# Patient Record
Sex: MALE | Race: WHITE | Employment: OTHER | URBAN - METROPOLITAN AREA
[De-identification: names, ages, dates, MRNs, and addresses within clinical notes are randomized per-mention and may not be internally consistent; named-entity substitution may affect disease eponyms.]

---

## 2018-02-13 ENCOUNTER — APPOINTMENT (OUTPATIENT)
Dept: GENERAL RADIOLOGY | Age: 83
End: 2018-02-13
Attending: EMERGENCY MEDICINE
Payer: MEDICARE

## 2018-02-13 ENCOUNTER — HOSPITAL ENCOUNTER (EMERGENCY)
Age: 83
Discharge: HOME | End: 2018-02-13
Attending: EMERGENCY MEDICINE
Payer: MEDICARE

## 2018-02-13 VITALS
TEMPERATURE: 97.7 F | SYSTOLIC BLOOD PRESSURE: 139 MMHG | OXYGEN SATURATION: 94 % | BODY MASS INDEX: 24.34 KG/M2 | WEIGHT: 170 LBS | HEIGHT: 70 IN | HEART RATE: 65 BPM | RESPIRATION RATE: 18 BRPM | DIASTOLIC BLOOD PRESSURE: 57 MMHG

## 2018-02-13 DIAGNOSIS — R00.0 TACHYCARDIA: Primary | ICD-10-CM

## 2018-02-13 LAB
ALBUMIN SERPL-MCNC: 3.6 G/DL (ref 3.5–5)
ALBUMIN/GLOB SERPL: 0.9 {RATIO} (ref 1.1–2.2)
ALP SERPL-CCNC: 75 U/L (ref 45–117)
ALT SERPL-CCNC: 40 U/L (ref 12–78)
ANION GAP SERPL CALC-SCNC: 9 MMOL/L (ref 5–15)
AST SERPL-CCNC: 34 U/L (ref 15–37)
BASOPHILS # BLD: 0 K/UL (ref 0–0.1)
BASOPHILS NFR BLD: 0 % (ref 0–1)
BILIRUB SERPL-MCNC: 0.5 MG/DL (ref 0.2–1)
BUN SERPL-MCNC: 19 MG/DL (ref 6–20)
BUN/CREAT SERPL: 16 (ref 12–20)
CALCIUM SERPL-MCNC: 8.7 MG/DL (ref 8.5–10.1)
CHLORIDE SERPL-SCNC: 105 MMOL/L (ref 97–108)
CO2 SERPL-SCNC: 26 MMOL/L (ref 21–32)
CREAT SERPL-MCNC: 1.21 MG/DL (ref 0.7–1.3)
D DIMER PPP FEU-MCNC: 0.58 MG/L FEU (ref 0–0.65)
DIFFERENTIAL METHOD BLD: ABNORMAL
EOSINOPHIL # BLD: 0.4 K/UL (ref 0–0.4)
EOSINOPHIL NFR BLD: 8 % (ref 0–7)
ERYTHROCYTE [DISTWIDTH] IN BLOOD BY AUTOMATED COUNT: 14 % (ref 11.5–14.5)
GLOBULIN SER CALC-MCNC: 3.9 G/DL (ref 2–4)
GLUCOSE SERPL-MCNC: 103 MG/DL (ref 65–100)
HCT VFR BLD AUTO: 47.7 % (ref 36.6–50.3)
HGB BLD-MCNC: 15.6 G/DL (ref 12.1–17)
LACTATE SERPL-SCNC: 1.4 MMOL/L (ref 0.4–2)
LYMPHOCYTES # BLD: 1.5 K/UL
LYMPHOCYTES NFR BLD: 27 % (ref 12–49)
MCH RBC QN AUTO: 29.2 PG (ref 26–34)
MCHC RBC AUTO-ENTMCNC: 32.7 G/DL (ref 30–36.5)
MCV RBC AUTO: 89.3 FL (ref 80–99)
MONOCYTES # BLD: 0.7 K/UL (ref 0–1)
MONOCYTES NFR BLD: 13 % (ref 5–13)
NEUTS SEG # BLD: 2.8 K/UL (ref 1.8–8)
NEUTS SEG NFR BLD: 52 % (ref 32–75)
PLATELET # BLD AUTO: 160 K/UL (ref 150–400)
PMV BLD AUTO: 12.8 FL (ref 8.9–12.9)
POTASSIUM SERPL-SCNC: 4.2 MMOL/L (ref 3.5–5.1)
PROT SERPL-MCNC: 7.5 G/DL (ref 6.4–8.2)
RBC # BLD AUTO: 5.34 M/UL (ref 4.1–5.7)
SODIUM SERPL-SCNC: 140 MMOL/L (ref 136–145)
TROPONIN I SERPL-MCNC: <0.04 NG/ML
WBC # BLD AUTO: 5.5 K/UL (ref 4.1–11.1)
XXWBCSUS: 0

## 2018-02-13 PROCEDURE — 84484 ASSAY OF TROPONIN QUANT: CPT | Performed by: EMERGENCY MEDICINE

## 2018-02-13 PROCEDURE — 85025 COMPLETE CBC W/AUTO DIFF WBC: CPT | Performed by: EMERGENCY MEDICINE

## 2018-02-13 PROCEDURE — 74011250636 HC RX REV CODE- 250/636: Performed by: EMERGENCY MEDICINE

## 2018-02-13 PROCEDURE — 93005 ELECTROCARDIOGRAM TRACING: CPT

## 2018-02-13 PROCEDURE — 71045 X-RAY EXAM CHEST 1 VIEW: CPT

## 2018-02-13 PROCEDURE — 80053 COMPREHEN METABOLIC PANEL: CPT | Performed by: EMERGENCY MEDICINE

## 2018-02-13 PROCEDURE — 96361 HYDRATE IV INFUSION ADD-ON: CPT

## 2018-02-13 PROCEDURE — 96374 THER/PROPH/DIAG INJ IV PUSH: CPT

## 2018-02-13 PROCEDURE — 85379 FIBRIN DEGRADATION QUANT: CPT | Performed by: EMERGENCY MEDICINE

## 2018-02-13 PROCEDURE — 74011000250 HC RX REV CODE- 250: Performed by: EMERGENCY MEDICINE

## 2018-02-13 PROCEDURE — 36415 COLL VENOUS BLD VENIPUNCTURE: CPT | Performed by: EMERGENCY MEDICINE

## 2018-02-13 PROCEDURE — 99284 EMERGENCY DEPT VISIT MOD MDM: CPT

## 2018-02-13 PROCEDURE — 83605 ASSAY OF LACTIC ACID: CPT | Performed by: EMERGENCY MEDICINE

## 2018-02-13 RX ORDER — METOPROLOL TARTRATE 5 MG/5ML
5 INJECTION INTRAVENOUS
Status: COMPLETED | OUTPATIENT
Start: 2018-02-13 | End: 2018-02-13

## 2018-02-13 RX ORDER — OMEPRAZOLE 20 MG/1
20 CAPSULE, DELAYED RELEASE ORAL DAILY
COMMUNITY

## 2018-02-13 RX ORDER — MELATONIN
1000 DAILY
Status: ON HOLD | COMMUNITY
End: 2019-03-13

## 2018-02-13 RX ORDER — METOPROLOL TARTRATE 25 MG/1
25 TABLET, FILM COATED ORAL 2 TIMES DAILY
COMMUNITY
End: 2019-01-05

## 2018-02-13 RX ADMIN — SODIUM CHLORIDE 1000 ML: 900 INJECTION, SOLUTION INTRAVENOUS at 18:47

## 2018-02-13 RX ADMIN — METOPROLOL TARTRATE 5 MG: 1 INJECTION, SOLUTION INTRAVENOUS at 18:59

## 2018-02-13 NOTE — ED TRIAGE NOTES
Wife states that the patient's BP dropped and his HR went to 132 a half an hour ago. \"He's been breathing hard the past few days, I have been fussing about it. \" Patient is not c/o CP and no shortness of breath.

## 2018-02-13 NOTE — ED PROVIDER NOTES
HPI Comments: Mr. Murriel Severance is an 70-year-old male with past medical history of hypertension, arthritis, hyperlipidemia, coronary artery disease, status post bypass, presenting with complaints of palpitations. The patient states he was ambulated to his mailbox approximately 90 minutes prior to arrival began to feel palpitations but not resolve. He is chest pain, shortness of breath, lightheadedness, nausea, or vomiting. He denies previous episodes of similar palpitations. States he takes aspirin weekly, denies other anticoagulants or blood thinners. States his cardiologist is Dr. Jose Nix. Patient is a 80 y.o. male presenting with rapid heart beat. The history is provided by the patient and the spouse. No  was used. Rapid Heart Rate   This is a new problem. The current episode started 1 to 2 hours ago. The problem occurs constantly. The problem has not changed since onset. Pertinent negatives include no chest pain, no abdominal pain, no headaches and no shortness of breath. Nothing aggravates the symptoms. Nothing relieves the symptoms. He has tried nothing for the symptoms. Past Medical History:   Diagnosis Date    Arthritis     Hypercholesterolemia     Hypertension        Past Surgical History:   Procedure Laterality Date    HX ORTHOPAEDIC  2/10    L shoulder replacement         History reviewed. No pertinent family history. Social History     Social History    Marital status:      Spouse name: N/A    Number of children: N/A    Years of education: N/A     Occupational History    Not on file. Social History Main Topics    Smoking status: Never Smoker    Smokeless tobacco: Never Used    Alcohol use No    Drug use: No    Sexual activity: Yes     Partners: Female     Other Topics Concern    Not on file     Social History Narrative         ALLERGIES: Lipitor [atorvastatin]    Review of Systems   Constitutional: Negative for activity change, chills and fever. HENT: Negative for nosebleeds, sore throat, trouble swallowing and voice change. Eyes: Negative for visual disturbance. Respiratory: Negative for shortness of breath. Cardiovascular: Negative for chest pain and palpitations. Gastrointestinal: Negative for abdominal pain, constipation, diarrhea and nausea. Genitourinary: Negative for difficulty urinating, dysuria, hematuria and urgency. Musculoskeletal: Negative for back pain, neck pain and neck stiffness. Skin: Negative for color change. Allergic/Immunologic: Negative for immunocompromised state. Neurological: Negative for dizziness, seizures, syncope, weakness, light-headedness, numbness and headaches. Psychiatric/Behavioral: Negative for behavioral problems, confusion, hallucinations, self-injury and suicidal ideas. Vitals:    02/13/18 1835   BP: (!) 137/91   Pulse: (!) 157   Resp: 15   Temp: 97.7 °F (36.5 °C)   SpO2: 95%   Weight: 77.1 kg (170 lb)   Height: 5' 10\" (1.778 m)            Physical Exam   Constitutional: He is oriented to person, place, and time. He appears well-developed and well-nourished. No distress. HENT:   Head: Normocephalic and atraumatic. Eyes: Pupils are equal, round, and reactive to light. Neck: Normal range of motion. Neck supple. Cardiovascular: Regular rhythm and normal heart sounds. Tachycardia present. Exam reveals no gallop and no friction rub. No murmur heard. Pulmonary/Chest: Effort normal and breath sounds normal. No respiratory distress. He has no wheezes. Abdominal: Soft. Bowel sounds are normal. He exhibits no distension. There is no tenderness. There is no rebound and no guarding. Musculoskeletal: Normal range of motion. Neurological: He is alert and oriented to person, place, and time. Skin: Skin is warm. No rash noted. He is not diaphoretic. Psychiatric: He has a normal mood and affect.  His behavior is normal. Judgment and thought content normal.   Nursing note and vitals reviewed. MDM  Number of Diagnoses or Management Options  Diagnosis management comments: ED EKG interpretation:  Rhythm: sinus tachycardia; and regular . Rate (approx.): 155; Axis: left axis deviation; P wave: normal; QRS interval: normal: Other findings: abnormal ekg. EKG documented by Víctor Vickers MD, scribe, as interpreted by Víctor Vickers MD, ED MD.          ED Course     This is a 60-year-old male with past medical history, review of systems, physical exam as above, presenting with complaints of tachycardia, and palpitations without chest pain, or other symptoms. On arrival patient noted to be tachycardic in the 150s, maintaining his blood pressure is adequately, without complaints of chest pain or shortness of breath. Patient denies previous episodes of similar palpitations or tachycardia, denies recent illnesses, including fevers, chills, nausea, vomiting. Physical exam remarkable for an elderly male, in no acute distress, with clear breath sounds, soft nontender abdomen, nonpainful, compressible lower extremities, without erythema. Ddx include sepsis, arrhythmia, dehydration, electrolyte abnormality. Plan to provide fluid bolus, obtain CMP, CBC, cardiac enzymes, chest x-ray, d-dimer, will likely provide beta blockers in the setting of unremarkable other findings, however will hold for now as patient remains asymptomatic, with adequate blood pressures. He will make a disposition based on the patient's diagnostics a response to therapy, however given the patient's age, presentation, and comorbidities, admission for further care and evaluation is likely. Procedures      BESIDE SIGN OUT:  7:09 PM  Discussed pt's hx, disposition, and available diagnostic and imaging results with Dr. Salud Arrieta at bedside with the patient. Reviewed care plans. Both providers and patient are in agreement with care plan. Dr. Alberto Murphy is transferring care of the pt to Dr. Salud Arrieta at this time.

## 2018-02-14 LAB
ATRIAL RATE: 155 BPM
ATRIAL RATE: 63 BPM
CALCULATED P AXIS, ECG09: 39 DEGREES
CALCULATED R AXIS, ECG10: -19 DEGREES
CALCULATED R AXIS, ECG10: -23 DEGREES
CALCULATED T AXIS, ECG11: 107 DEGREES
CALCULATED T AXIS, ECG11: 26 DEGREES
DIAGNOSIS, 93000: NORMAL
DIAGNOSIS, 93000: NORMAL
P-R INTERVAL, ECG05: 120 MS
P-R INTERVAL, ECG05: 184 MS
Q-T INTERVAL, ECG07: 296 MS
Q-T INTERVAL, ECG07: 468 MS
QRS DURATION, ECG06: 80 MS
QRS DURATION, ECG06: 80 MS
QTC CALCULATION (BEZET), ECG08: 475 MS
QTC CALCULATION (BEZET), ECG08: 478 MS
VENTRICULAR RATE, ECG03: 155 BPM
VENTRICULAR RATE, ECG03: 63 BPM

## 2018-02-14 NOTE — ED NOTES
7:22 PM  Change of shift. Care of patient taken over from Dr. Peg Lanes; H&P reviewed, handoff complete. Awaiting labs/imaging/consultant. 8:41 PM  Pt converted into NSR with PVCs in a bigeminy pattern. Discussed results and plan with patient. Patient will be discharged home with PCP followup. Patient instructed to return to the emergency room for any worsening symptoms or any other concerns.

## 2018-02-14 NOTE — ED NOTES
Patient noticed earlier, that he felt his neck \"not pounding anymore;\" he was not sure what time it was. Per monitor it was between 7370-4429 and his heart rate went into the 60's, but patient was not aware of that. Has not had any chest pain or shortness of breath. Wife has been at the bedside at all times. Patient recently voided into a urinal and had another 12 lead EKG done. Dr Lovely Hampton has the EKG and all updates. Patient would like to go home at this time.

## 2018-02-14 NOTE — ED NOTES
The patient was discharged home by Dr Ольга Buitrago in stable condition. The patient is alert and oriented, in no respiratory distress. The patient's diagnosis, condition and treatment were explained. The patient and his wife expressed understanding. A discharge plan has been developed. A  was not involved in the process. Aftercare instructions were given. Pt ambulatory out of the ED with his wife. Patient was offered a wheelchair but did not want to use one. Currently no CP and no shortness of breath.

## 2018-02-14 NOTE — DISCHARGE INSTRUCTIONS
We hope that we have addressed all of your medical concerns. The examination and treatment you received in the Emergency Department were for an emergent problem and were not intended as complete care. It is important that you follow up with your healthcare provider(s) for ongoing care. If your symptoms worsen or do not improve as expected, and you are unable to reach your usual health care provider(s), you should return to the Emergency Department. Today's healthcare is undergoing tremendous change, and patient satisfaction surveys are one of the many tools to assess the quality of medical care. You may receive a survey from the [employer] regarding your experience in the Emergency Department. I hope that your experience has been completely positive, particularly the medical care that I provided. As such, please participate in the survey; anything less than excellent does not meet my expectations or intentions. Formerly [facility] and [employer] participate in nationally recognized quality of care measures. If your blood pressure is greater than 120/80, as reported below, we urge that you seek medical care to address the potential of high blood pressure, commonly known as hypertension. Hypertension can be hereditary or can be caused by certain medical conditions, pain, stress, or \"white coat syndrome. \"       Please make an appointment with your health care provider(s) for follow up of your Emergency Department visit.        VITALS:   Patient Vitals for the past 8 hrs:   Temp Pulse Resp BP SpO2   02/13/18 2015 - 62 19 143/81 (!) 89 %   02/13/18 1945 - 64 19 156/68 (!) 86 %   02/13/18 1930 - 60 19 141/78 (!) 85 %   02/13/18 1916 - (!) 147 - - -   02/13/18 1900 - (!) 153 20 (!) 133/101 91 %   02/13/18 1859 - (!) 152 - (!) 133/101 -   02/13/18 1851 - (!) 155 16 (!) 138/99 92 %   02/13/18 1835 97.7 °F (36.5 °C) (!) 157 15 (!) 137/91 95 %          Thank you for allowing us to provide you with medical care today. We realize that you have many choices for your emergency care needs. Please choose us in the future for any continued health care needs. Charissa Jimenez Alexis Gary, [address] [facility]: [phone]            Recent Results (from the past 24 hour(s))   EKG, 12 LEAD, INITIAL    Collection Time: 02/13/18  6:30 PM   Result Value Ref Range    Ventricular Rate 155 BPM    Atrial Rate 155 BPM    P-R Interval 120 ms    QRS Duration 80 ms    Q-T Interval 296 ms    QTC Calculation (Bezet) 475 ms    Calculated R Axis -23 degrees    Calculated T Axis 107 degrees    Diagnosis       Sinus tachycardia with occasional premature ventricular complexes  Left ventricular hypertrophy with repolarization abnormality  Abnormal ECG  When compared with ECG of 23-NOV-2010 15:34,  premature ventricular complexes are now present  Vent. rate has increased BY  82 BPM  ST now depressed in Lateral leads     CBC WITH AUTOMATED DIFF    Collection Time: 02/13/18  6:41 PM   Result Value Ref Range    WBC 5.5 4.1 - 11.1 K/uL    RBC 5.34 4.10 - 5.70 M/uL    HGB 15.6 12.1 - 17.0 g/dL    HCT 47.7 36.6 - 50.3 %    MCV 89.3 80.0 - 99.0 FL    MCH 29.2 26.0 - 34.0 PG    MCHC 32.7 30.0 - 36.5 g/dL    RDW 14.0 11.5 - 14.5 %    PLATELET 569 656 - 704 K/uL    MPV 12.8 8.9 - 12.9 FL    NEUTROPHILS 52 32 - 75 %    LYMPHOCYTES 27 12 - 49 %    MONOCYTES 13 5 - 13 %    EOSINOPHILS 8 (H) 0 - 7 %    BASOPHILS 0 0 - 1 %    ABS. NEUTROPHILS 2.8 1.8 - 8.0 K/UL    ABS. LYMPHOCYTES 1.5 K/UL    ABS. MONOCYTES 0.7 0.0 - 1.0 K/UL    ABS. EOSINOPHILS 0.4 0.0 - 0.4 K/UL    ABS.  BASOPHILS 0.0 0.0 - 0.1 K/UL    DF AUTOMATED      XXWBCSUS 0     METABOLIC PANEL, COMPREHENSIVE    Collection Time: 02/13/18  6:41 PM   Result Value Ref Range    Sodium 140 136 - 145 mmol/L    Potassium 4.2 3.5 - 5.1 mmol/L    Chloride 105 97 - 108 mmol/L    CO2 26 21 - 32 mmol/L    Anion gap 9 5 - 15 mmol/L    Glucose 103 (H) 65 - 100 mg/dL    BUN 19 6 - 20 MG/DL    Creatinine 1.21 0.70 - 1.30 MG/DL    BUN/Creatinine ratio 16 12 - 20      GFR est AA >60 >60 ml/min/1.73m2    GFR est non-AA 57 (L) >60 ml/min/1.73m2    Calcium 8.7 8.5 - 10.1 MG/DL    Bilirubin, total 0.5 0.2 - 1.0 MG/DL    ALT (SGPT) 40 12 - 78 U/L    AST (SGOT) 34 15 - 37 U/L    Alk. phosphatase 75 45 - 117 U/L    Protein, total 7.5 6.4 - 8.2 g/dL    Albumin 3.6 3.5 - 5.0 g/dL    Globulin 3.9 2.0 - 4.0 g/dL    A-G Ratio 0.9 (L) 1.1 - 2.2     TROPONIN I    Collection Time: 02/13/18  6:41 PM   Result Value Ref Range    Troponin-I, Qt. <0.04 <0.08 ng/mL   LACTIC ACID    Collection Time: 02/13/18  6:41 PM   Result Value Ref Range    Lactic acid 1.4 0.4 - 2.0 MMOL/L   D DIMER    Collection Time: 02/13/18  6:41 PM   Result Value Ref Range    D-dimer 0.58 0.00 - 0.65 mg/L [facility]   EKG, 12 LEAD, INITIAL    Collection Time: 02/13/18  8:22 PM   Result Value Ref Range    Ventricular Rate 63 BPM    Atrial Rate 63 BPM    P-R Interval 184 ms    QRS Duration 80 ms    Q-T Interval 468 ms    QTC Calculation (Bezet) 478 ms    Calculated P Axis 39 degrees    Calculated R Axis -19 degrees    Calculated T Axis 26 degrees    Diagnosis       Sinus rhythm with frequent premature ventricular complexes in a pattern of   bigeminy  Minimal voltage criteria for LVH, may be normal variant  Borderline ECG  When compared with ECG of 13-FEB-2018 18:30,  MANUAL COMPARISON REQUIRED, DATA IS UNCONFIRMED         Xr Chest Port    Result Date: 2/13/2018  EXAM:  XR CHEST PORT INDICATION:  Chest Pain, several day history COMPARISON:  12/27/2005 FINDINGS: A portable AP radiograph of the chest was obtained at 18:44 hours. There are small calcified pleural plaques in the left lung, also present previously. There is a calcified granuloma in the right lung base. There is no acute airspace disease. The patient has undergone prior median sternotomy and cardiac surgery.  There is mild enlargement of the cardiac silhouette. There is no vascular congestion or pleural fluid. There is a left shoulder prosthesis. 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 IMPRESSION: No acute process. Cardiac Arrhythmia: Care Instructions  Your Care Instructions    A cardiac arrhythmia is a change in the normal rhythm of the heart. Your heart may beat too fast or too slow or beat with an irregular or skipping rhythm. A change in the heart's rhythm may feel like a really strong heartbeat or a fluttering in your chest. A severe heart rhythm problem can keep the body from getting the blood it needs. This can result in shortness of breath, lightheadedness, and fainting. You may take medicine to treat your condition. Your doctor may recommend a pacemaker or recommend catheter ablation to destroy small parts of the heart that are causing a rhythm problem. Another possible treatment is an implantable cardioverter-defibrillator (ICD). An ICD is a device that gives the heart a shock to return the heart to a normal rhythm. Follow-up care is a key part of your treatment and safety. Be sure to make and go to all appointments, and call your doctor if you are having problems. It's also a good idea to know your test results and keep a list of the medicines you take. How can you care for yourself at home? ?General care  ? · Be safe with medicines. Take your medicines exactly as prescribed. Call your doctor if you think you are having a problem with your medicine. You will get more details on the specific medicines your doctor prescribes. ? · If you received a pacemaker or an ICD, you will get a fact sheet about it. ? · Wear medical alert jewelry that says you have an abnormal heart rhythm. You can buy this at most drugs[employer]ia Property Managerses. ? Lifestyle changes  ? · Eat a heart-healthy diet. ? · Stay at a healthy weight. Lose weight if you need to.    ? · Avoid nicotine, too much alcohol, and illegal drugs (meth, speed, and cocaine). Also, get enough sleep and do not overeat. ? · Ask your doctor whether you can take over-the-counter medicines (such as decongestants). These can make your heart beat fast.   ? · Talk to your doctor about any limits to activities, such as driving, or tasks where you use power tools or ladders. Activity  ? · Start light exercise if your doctor says you can. Even a small amount will help you get stronger, have more energy, and manage your stress. ? · Get regular exercise. Try for 30 minutes on most days of the week. Ask your doctor what level of exercise is safe for you. If activity is not likely to cause health problems, you probably do not have limits on the type or level of activity that you can do. You may want to walk, swim, bike, or do other activities. ? · When you exercise, watch for signs that your heart is working too hard. You are pushing too hard if you cannot talk while you exercise. If you become short of breath or dizzy or have chest pain, sit down and rest.   ? · Check your pulse daily. Place two fingers on the artery at the palm side of your wrist, in line with your thumb. If your heartbeat seems uneven, talk to your doctor. When should you call for help? Call 911 anytime you think you may need emergency care. For example, call if:  ? · You have symptoms of sudden heart failure. These may include:  ¨ Severe trouble breathing. ¨ A fast or irregular heartbeat. ¨ Coughing up pink, foamy mucus. ¨ You passed out. ? · You have signs of a stroke. These include:  ¨ Sudden numbness, paralysis, or weakness in your face, arm, or leg, especially on only one side of your body. ¨ New problems with walking or balance. ¨ Sudden vision changes. ¨ Drooling or slurred speech. ¨ New problems speaking or understanding simple statements, or feeling confused. ¨ A sudden, severe headache that is different from past headaches.    ?Call your doctor now or seek immediate medical care if:  ? · You have new or changed symptoms of heart failure, such as:  ¨ New or increased shortness of breath. ¨ New or worse swelling in your legs, ankles, or feet. ¨ Sudden weight gain, such as more than 2 to 3 pounds in a day or 5 pounds in a week. (Your doctor may suggest a different range of weight gain.)  ¨ Feeling dizzy or lightheaded or like you may faint. ¨ Feeling so tired or weak that you cannot do your usual activities. ¨ Not sleeping well. Shortness of breath wakes you at night. You need extra pillows to prop yourself up to breathe easier. ? Watch closely for changes in your health, and be sure to contact your doctor if:  ? · You do not get better as expected. Where can you learn more? Go to http://morteza-jan.info/. Enter K395 in the search box to learn more about \"Cardiac Arrhythmia: Care Instructions. \"  Current as of: September 21, 2016  Content Version: 11.4  © 9329-1377 [employer]. Care instructions adapted under license by [employer] (which disclaims liability or warranty for this information). If you have questions about a medical condition or this instruction, always ask your healthcare professional. [address] any warranty or liability for your use of this information.

## 2018-12-31 ENCOUNTER — HOSPITAL ENCOUNTER (INPATIENT)
Age: 83
LOS: 5 days | Discharge: HOME | DRG: 244 | End: 2019-01-05
Attending: EMERGENCY MEDICINE | Admitting: INTERNAL MEDICINE
Payer: MEDICARE

## 2018-12-31 DIAGNOSIS — I47.1 SVT (SUPRAVENTRICULAR TACHYCARDIA) (HCC): ICD-10-CM

## 2018-12-31 DIAGNOSIS — R00.2 PALPITATIONS: Primary | ICD-10-CM

## 2018-12-31 LAB
ALBUMIN SERPL-MCNC: 3.5 G/DL (ref 3.5–5)
ALBUMIN/GLOB SERPL: 0.9 {RATIO} (ref 1.1–2.2)
ALP SERPL-CCNC: 72 U/L (ref 45–117)
ALT SERPL-CCNC: 24 U/L (ref 12–78)
ANION GAP SERPL CALC-SCNC: 8 MMOL/L (ref 5–15)
AST SERPL-CCNC: 17 U/L (ref 15–37)
BASOPHILS # BLD: 0 K/UL (ref 0–0.1)
BASOPHILS NFR BLD: 0 % (ref 0–1)
BILIRUB SERPL-MCNC: 0.5 MG/DL (ref 0.2–1)
BUN SERPL-MCNC: 19 MG/DL (ref 6–20)
BUN/CREAT SERPL: 13 (ref 12–20)
CALCIUM SERPL-MCNC: 8.6 MG/DL (ref 8.5–10.1)
CHLORIDE SERPL-SCNC: 105 MMOL/L (ref 97–108)
CO2 SERPL-SCNC: 27 MMOL/L (ref 21–32)
COMMENT, HOLDF: NORMAL
CREAT SERPL-MCNC: 1.42 MG/DL (ref 0.7–1.3)
DIFFERENTIAL METHOD BLD: ABNORMAL
EOSINOPHIL # BLD: 0.4 K/UL (ref 0–0.4)
EOSINOPHIL NFR BLD: 8 % (ref 0–7)
ERYTHROCYTE [DISTWIDTH] IN BLOOD BY AUTOMATED COUNT: 13.9 % (ref 11.5–14.5)
GLOBULIN SER CALC-MCNC: 3.8 G/DL (ref 2–4)
GLUCOSE SERPL-MCNC: 81 MG/DL (ref 65–100)
HCT VFR BLD AUTO: 48 % (ref 36.6–50.3)
HGB BLD-MCNC: 16 G/DL (ref 12.1–17)
LYMPHOCYTES # BLD: 1.3 K/UL
LYMPHOCYTES NFR BLD: 22 % (ref 12–49)
MAGNESIUM SERPL-MCNC: 2 MG/DL (ref 1.6–2.4)
MCH RBC QN AUTO: 30.2 PG (ref 26–34)
MCHC RBC AUTO-ENTMCNC: 33.3 G/DL (ref 30–36.5)
MCV RBC AUTO: 90.6 FL (ref 80–99)
MONOCYTES # BLD: 0.8 K/UL (ref 0–1)
MONOCYTES NFR BLD: 14 % (ref 5–13)
NEUTS SEG # BLD: 3.2 K/UL (ref 1.8–8)
NEUTS SEG NFR BLD: 56 % (ref 32–75)
PLATELET # BLD AUTO: 171 K/UL (ref 150–400)
PMV BLD AUTO: 11.9 FL (ref 8.9–12.9)
POTASSIUM SERPL-SCNC: 4.1 MMOL/L (ref 3.5–5.1)
PROT SERPL-MCNC: 7.3 G/DL (ref 6.4–8.2)
RBC # BLD AUTO: 5.3 M/UL (ref 4.1–5.7)
SAMPLES BEING HELD,HOLD: NORMAL
SODIUM SERPL-SCNC: 140 MMOL/L (ref 136–145)
TROPONIN I BLD-MCNC: <0.04 NG/ML (ref 0–0.08)
TSH SERPL DL<=0.05 MIU/L-ACNC: 2.99 UIU/ML (ref 0.36–3.74)
WBC # BLD AUTO: 5.7 K/UL (ref 4.1–11.1)
XXWBCSUS: 0

## 2018-12-31 PROCEDURE — 74011000258 HC RX REV CODE- 258: Performed by: INTERNAL MEDICINE

## 2018-12-31 PROCEDURE — 74011250636 HC RX REV CODE- 250/636: Performed by: EMERGENCY MEDICINE

## 2018-12-31 PROCEDURE — 83735 ASSAY OF MAGNESIUM: CPT

## 2018-12-31 PROCEDURE — 74011000250 HC RX REV CODE- 250: Performed by: EMERGENCY MEDICINE

## 2018-12-31 PROCEDURE — 99291 CRITICAL CARE FIRST HOUR: CPT

## 2018-12-31 PROCEDURE — 80053 COMPREHEN METABOLIC PANEL: CPT

## 2018-12-31 PROCEDURE — 96361 HYDRATE IV INFUSION ADD-ON: CPT

## 2018-12-31 PROCEDURE — 74011250636 HC RX REV CODE- 250/636

## 2018-12-31 PROCEDURE — 74011000258 HC RX REV CODE- 258: Performed by: EMERGENCY MEDICINE

## 2018-12-31 PROCEDURE — 96375 TX/PRO/DX INJ NEW DRUG ADDON: CPT

## 2018-12-31 PROCEDURE — 84443 ASSAY THYROID STIM HORMONE: CPT

## 2018-12-31 PROCEDURE — 84484 ASSAY OF TROPONIN QUANT: CPT

## 2018-12-31 PROCEDURE — 93005 ELECTROCARDIOGRAM TRACING: CPT

## 2018-12-31 PROCEDURE — 85025 COMPLETE CBC W/AUTO DIFF WBC: CPT

## 2018-12-31 PROCEDURE — 65610000006 HC RM INTENSIVE CARE

## 2018-12-31 PROCEDURE — 96374 THER/PROPH/DIAG INJ IV PUSH: CPT

## 2018-12-31 PROCEDURE — 96376 TX/PRO/DX INJ SAME DRUG ADON: CPT

## 2018-12-31 PROCEDURE — 36415 COLL VENOUS BLD VENIPUNCTURE: CPT

## 2018-12-31 PROCEDURE — 74011250637 HC RX REV CODE- 250/637: Performed by: EMERGENCY MEDICINE

## 2018-12-31 PROCEDURE — 74011250637 HC RX REV CODE- 250/637: Performed by: INTERNAL MEDICINE

## 2018-12-31 PROCEDURE — 74011250636 HC RX REV CODE- 250/636: Performed by: INTERNAL MEDICINE

## 2018-12-31 PROCEDURE — 77030022643 HC PAD DEFIB AD HRTSTRT PHL -B

## 2018-12-31 RX ORDER — PANTOPRAZOLE SODIUM 40 MG/1
40 TABLET, DELAYED RELEASE ORAL DAILY
Status: DISCONTINUED | OUTPATIENT
Start: 2019-01-01 | End: 2019-01-05 | Stop reason: HOSPADM

## 2018-12-31 RX ORDER — METOPROLOL TARTRATE 50 MG/1
50 TABLET ORAL
Status: COMPLETED | OUTPATIENT
Start: 2018-12-31 | End: 2018-12-31

## 2018-12-31 RX ORDER — ENOXAPARIN SODIUM 100 MG/ML
40 INJECTION SUBCUTANEOUS DAILY
Status: DISCONTINUED | OUTPATIENT
Start: 2019-01-01 | End: 2019-01-02

## 2018-12-31 RX ORDER — DIPHENHYDRAMINE HCL 25 MG
25 CAPSULE ORAL
Status: DISCONTINUED | OUTPATIENT
Start: 2018-12-31 | End: 2019-01-05 | Stop reason: HOSPADM

## 2018-12-31 RX ORDER — ACETAMINOPHEN 325 MG/1
650 TABLET ORAL
Status: DISCONTINUED | OUTPATIENT
Start: 2018-12-31 | End: 2019-01-05 | Stop reason: HOSPADM

## 2018-12-31 RX ORDER — ONDANSETRON 2 MG/ML
4 INJECTION INTRAMUSCULAR; INTRAVENOUS
Status: DISCONTINUED | OUTPATIENT
Start: 2018-12-31 | End: 2019-01-05 | Stop reason: HOSPADM

## 2018-12-31 RX ORDER — ADENOSINE 3 MG/ML
6 INJECTION, SOLUTION INTRAVENOUS
Status: COMPLETED | OUTPATIENT
Start: 2018-12-31 | End: 2018-12-31

## 2018-12-31 RX ORDER — SODIUM CHLORIDE 9 MG/ML
75 INJECTION, SOLUTION INTRAVENOUS CONTINUOUS
Status: DISCONTINUED | OUTPATIENT
Start: 2018-12-31 | End: 2019-01-02

## 2018-12-31 RX ORDER — GUAIFENESIN 100 MG/5ML
81 LIQUID (ML) ORAL DAILY
Status: DISCONTINUED | OUTPATIENT
Start: 2019-01-01 | End: 2019-01-05 | Stop reason: HOSPADM

## 2018-12-31 RX ORDER — METOPROLOL TARTRATE 5 MG/5ML
5 INJECTION INTRAVENOUS
Status: COMPLETED | OUTPATIENT
Start: 2018-12-31 | End: 2018-12-31

## 2018-12-31 RX ORDER — METOPROLOL TARTRATE 25 MG/1
25 TABLET, FILM COATED ORAL 2 TIMES DAILY
Status: DISCONTINUED | OUTPATIENT
Start: 2019-01-01 | End: 2019-01-01

## 2018-12-31 RX ORDER — ADENOSINE 3 MG/ML
INJECTION, SOLUTION INTRAVENOUS
Status: COMPLETED
Start: 2018-12-31 | End: 2018-12-31

## 2018-12-31 RX ORDER — ROSUVASTATIN CALCIUM 10 MG/1
5 TABLET, COATED ORAL
Status: DISCONTINUED | OUTPATIENT
Start: 2018-12-31 | End: 2019-01-05 | Stop reason: HOSPADM

## 2018-12-31 RX ORDER — ADENOSINE 3 MG/ML
12 INJECTION, SOLUTION INTRAVENOUS ONCE
Status: COMPLETED | OUTPATIENT
Start: 2018-12-31 | End: 2018-12-31

## 2018-12-31 RX ADMIN — AMIODARONE HYDROCHLORIDE 150 MG: 50 INJECTION, SOLUTION INTRAVENOUS at 20:37

## 2018-12-31 RX ADMIN — ADENOSINE 6 MG: 3 INJECTION, SOLUTION INTRAVENOUS at 18:55

## 2018-12-31 RX ADMIN — DEXTROSE 1 MG/MIN: 5 SOLUTION INTRAVENOUS at 22:32

## 2018-12-31 RX ADMIN — SODIUM CHLORIDE 75 ML/HR: 900 INJECTION, SOLUTION INTRAVENOUS at 23:09

## 2018-12-31 RX ADMIN — ADENOSINE 12 MG: 3 INJECTION, SOLUTION INTRAVENOUS at 18:58

## 2018-12-31 RX ADMIN — ADENOSINE 6 MG: 3 INJECTION, SOLUTION INTRAVENOUS at 18:53

## 2018-12-31 RX ADMIN — SODIUM CHLORIDE 1000 ML: 900 INJECTION, SOLUTION INTRAVENOUS at 19:28

## 2018-12-31 RX ADMIN — METOPROLOL TARTRATE 5 MG: 5 INJECTION, SOLUTION INTRAVENOUS at 19:38

## 2018-12-31 RX ADMIN — METOPROLOL TARTRATE 5 MG: 1 INJECTION, SOLUTION INTRAVENOUS at 19:01

## 2018-12-31 RX ADMIN — AMIODARONE HYDROCHLORIDE 1 MG/MIN: 50 INJECTION, SOLUTION INTRAVENOUS at 20:53

## 2018-12-31 RX ADMIN — METOPROLOL TARTRATE 50 MG: 50 TABLET ORAL at 19:05

## 2018-12-31 RX ADMIN — ROSUVASTATIN CALCIUM 5 MG: 10 TABLET, FILM COATED ORAL at 23:09

## 2018-12-31 NOTE — ED TRIAGE NOTES
Pt was wheeled to the treatment area. Pt states \"since February last year I have been off and on  having a rapid heart beat it lasted 8 hrs the other day. I went to the doctor today he put me on a heart monitor. Today my heart beat went up to 166 lasted about 10 minutes the doctor told me to bear down and take deep breaths and it went away. He said if that happens I had to go to the ER. Pt appears in no distress at this time. Dr Lorena Plasencia in room.

## 2018-12-31 NOTE — ED NOTES
Pt HR increased to wide complex tachycardia Dr Abner Storey called to room. Pt bared down converted to narrow complex tacky cardia rate 163.

## 2018-12-31 NOTE — ED PROVIDER NOTES
Pt. Presents to the ER with complaints of palpitations. Pt. First had these sx in February. He then again had them in this summer. However, his sx have become much more frequent over the last week. Pt. Saw his cardiologist, Dr. Olvin Nevarez, who put an event monitor on him today. Pt. Had an episode approximately 30 minutes PTA where his heart rate went up to 160 and he felt palpitations. His sx lasted 10 minutes. He immediately came to the ER. Pt. Has been able to get himself out of his arrhythmia by bearing down. Past Medical History:  
Diagnosis Date  Arthritis  Hypercholesterolemia  Hypertension Past Surgical History:  
Procedure Laterality Date  HX ORTHOPAEDIC  2/10 L shoulder replacement History reviewed. No pertinent family history. Social History Socioeconomic History  Marital status:  Spouse name: Not on file  Number of children: Not on file  Years of education: Not on file  Highest education level: Not on file Social Needs  Financial resource strain: Not on file  Food insecurity - worry: Not on file  Food insecurity - inability: Not on file  Transportation needs - medical: Not on file  Transportation needs - non-medical: Not on file Occupational History  Not on file Tobacco Use  Smoking status: Never Smoker  Smokeless tobacco: Never Used Substance and Sexual Activity  Alcohol use: No  
 Drug use: No  
 Sexual activity: Yes  
  Partners: Female Other Topics Concern  Not on file Social History Narrative  Not on file ALLERGIES: Lipitor [atorvastatin] Review of Systems Constitutional: Negative for chills and fever. HENT: Negative for rhinorrhea and sore throat. Respiratory: Negative for cough and shortness of breath. Cardiovascular: Positive for palpitations. Negative for chest pain. Gastrointestinal: Negative for abdominal pain, diarrhea, nausea and vomiting. Genitourinary: Negative for dysuria and hematuria. Musculoskeletal: Negative for arthralgias and myalgias. Skin: Negative for pallor and rash. Neurological: Negative for dizziness, weakness and light-headedness. All other systems reviewed and are negative. Vitals:  
 12/31/18 1826 12/31/18 1827 12/31/18 1828 12/31/18 1829 BP:    147/89 Pulse: (!) 158 (!) 162 (!) 164 (!) 163 Resp: 19 22 21 20 Temp:      
SpO2: 96% 98% 97% 95% Physical Exam  
 
Vital signs reviewed. Nursing notes reviewed. Const:  No acute distress, well developed, well nourished Head:  Atraumatic, normocephalic Eyes:  PERRL, conjunctiva normal, no scleral icterus Neck:  Supple, trachea midline Cardiovascular:  RRR, no murmurs, no gallops, no rubs Resp:  No resp distress, no increased work of breathing, no wheezes, no rhonchi, no rales, Abd:  Soft, non-tender, non-distended, no rebound, no guarding, no CVA tenderness :  Deferred MSK:  No pedal edema, normal ROM Neuro:  Alert and oriented x3, no cranial nerve defect Skin:  Warm, dry, intact Psych: normal mood and affect, behavior is normal, judgement and thought content is normal 
 
 
 
MDM Number of Diagnoses or Management Options Palpitations:  
SVT (supraventricular tachycardia) ([facility], [address]): Amount and/or Complexity of Data Reviewed Clinical lab tests: ordered and reviewed Tests in the radiology section of CPT®: ordered and reviewed Review and summarize past medical records: yes Critical Care Total time providing critical care: 30-74 minutes (35 min.) Patient Progress Patient progress: stable [address] Pt. Had an episode of wide-complex tachycardia. He was able to bear down and then developed a narrow complex tachycardia with a rate in the 160s. [address] I spoke with Cardiology who recommends trying adenosine and then 5mg IV metop and 50 mg PO metoprolol. Pt. Presents to the ER with complaints of palpitations.   Pt. Maria Fernanda schulz a tachycardic rhythm in the ER, first a wide-complex tachycardia, followed by a narrow complex tachycardia. Sinus rhythm was achieved but not maintained after adenosine. Pt. Given metoprolol. Pt. Signed out to Dr. Claudia Swanson who will continue management. Procedures

## 2019-01-01 PROCEDURE — 65660000000 HC RM CCU STEPDOWN

## 2019-01-01 PROCEDURE — 74011250637 HC RX REV CODE- 250/637: Performed by: INTERNAL MEDICINE

## 2019-01-01 PROCEDURE — 77010033678 HC OXYGEN DAILY

## 2019-01-01 PROCEDURE — 74011250636 HC RX REV CODE- 250/636: Performed by: INTERNAL MEDICINE

## 2019-01-01 RX ADMIN — DIPHENHYDRAMINE HYDROCHLORIDE 25 MG: 25 CAPSULE ORAL at 23:07

## 2019-01-01 RX ADMIN — ENOXAPARIN SODIUM 40 MG: 40 INJECTION SUBCUTANEOUS at 08:31

## 2019-01-01 RX ADMIN — ROSUVASTATIN CALCIUM 5 MG: 10 TABLET, FILM COATED ORAL at 23:06

## 2019-01-01 RX ADMIN — PANTOPRAZOLE SODIUM 40 MG: 40 TABLET, DELAYED RELEASE ORAL at 08:31

## 2019-01-01 RX ADMIN — ASPIRIN 81 MG 81 MG: 81 TABLET ORAL at 08:31

## 2019-01-01 RX ADMIN — SODIUM CHLORIDE 75 ML/HR: 900 INJECTION, SOLUTION INTRAVENOUS at 14:13

## 2019-01-01 NOTE — PROGRESS NOTES
2210: TRANSFER - IN REPORT: 
 
Verbal report received from Evie Hernandez RN(name) on Abner Cormier  being received from [facility] ED(unit) for routine progression of care Report consisted of patients Situation, Background, Assessment and  
Recommendations(SBAR). Information from the following report(s) SBAR, Kardex, Procedure Summary, Intake/Output, MAR, Med Rec Status and Cardiac Rhythm Sinus Tach  was reviewed with the receiving nurse. Opportunity for questions and clarification was provided. Assessment completed upon patients arrival to unit and care assumed. 2215: Dr. Paul Benoit at bedside. 2220: Amiodarone order was discontinued in [facility], order to reorder Amiodarone per Dr. Paul Benoit 2232: Amiodarone restarted at 1mg/min. Patient HR in the 120s, no complaints of chest pain, dizziness or SOB. Family at bedside. 0220: Patient converted back to sinus cate with HR in the 50's, Dr. Paul Benoit aware and will decrease dose to 0.5mg/min. Per Dr. Paul Benoit continue amiodarone gtt until patient seen by cardiology, no need for lab work this am. Will continue to monitor. 0350: Patients HR dropping into the 30s-40s, Dr. Paul Benoit notified, order to stop amiodarone gtt, see MAR. 
 
0700: Bedside and Verbal shift change report given to [address] (oncoming nurse) by Zeke Dawson RN (offgoing nurse). Report included the following information SBAR, Kardex, Procedure Summary, Intake/Output, MAR, Recent Results, Med Rec Status and Cardiac Rhythm Sinus [place] .

## 2019-01-01 NOTE — CONSULTS
[facility]     Name: Nicolás Garcia MRN: [id number]   : 1933 Hospital: [address] [place] [address] Dr   Date: 2019        Impression Plan   1. SVT  2. Snoring/apnic episodes  3. AKILA- likely due to temporary hypoperfusion with SVT  4. Hx of CAD with CABG three years ago               · Amiodarone off  · BB held this AM due to HR in the 46s  · Cardiology consult pending  · Sleep study as outpatient- will arrange appt in sleep clinic  · ASA  · Crestor  · enox 40 mg SQ             Radiology  ( personally reviewed) No chest imaging on this admission. ABG No results for input(s): PHI, PO2I, PCO2I in the last 72 hours. Subjective     Cc: heart palpatations    81 yo with PMHx of CAD presenting with SVT. I am only able to find one strip in bedside chart which is SVT in 120s. . According to H&P difficutly to distinguish between SVT and Vtach. Pt placed on amiodarone and BB and converted to NSR overnight. Currently in NSR in low 60s. Feels back to baseline. Amio gtt off. Does not have a hx of arrythmias. No lung diseases other than asthma as a child. Per wife snores and stops breathing in his sleep and and his PCP has encouraged sleep clinic visit in the past.     Review of Systems:  A comprehensive review of systems was negative except for that written in the HPI. Past Medical History:   Diagnosis Date    Arthritis     Hypercholesterolemia     Hypertension     SVT (supraventricular tachycardia) (HCC)       Past Surgical History:   Procedure Laterality Date    HX ORTHOPAEDIC  2/10    L shoulder replacement      Prior to Admission medications    Medication Sig Start Date End Date Taking? Authorizing Provider   cholecalciferol (VITAMIN D3) 1,000 unit tablet Take 1,000 Units by mouth daily. Other, MD Mckayla   metoprolol tartrate (LOPRESSOR) 25 mg tablet Take 25 mg by mouth two (2) times a day. Other, MD Mckayla   omeprazole (PRILOSEC) 20 mg capsule Take 20 mg by mouth daily. Other, MD Mckayla   rosuvastatin (CRESTOR) 5 mg tablet Take  by mouth nightly. Provider, Historical   aspirin 81 mg chewable tablet Take 81 mg by mouth daily. Provider, Historical   VIT A,C & E/NIAC/B2/LUT/MN/GLU (EYE-MARYA PO) Take  by mouth. Provider, Historical     Current Facility-Administered Medications   Medication Dose Route Frequency    aspirin chewable tablet 81 mg  81 mg Oral DAILY    metoprolol tartrate (LOPRESSOR) tablet 25 mg  25 mg Oral BID    rosuvastatin (CRESTOR) tablet 5 mg  5 mg Oral QHS    pantoprazole (PROTONIX) tablet 40 mg  40 mg Oral DAILY    0.9% sodium chloride infusion  75 mL/hr IntraVENous CONTINUOUS    enoxaparin (LOVENOX) injection 40 mg  40 mg SubCUTAneous DAILY    amiodarone (CORDARONE) 375 mg in dextrose 5% 250 mL infusion  1 mg/min IntraVENous TITRATE     Allergies   Allergen Reactions    Lipitor [Atorvastatin] Rash and Itching      Social History     Tobacco Use    Smoking status: Never Smoker    Smokeless tobacco: Never Used   Substance Use Topics    Alcohol use: No      History reviewed. No pertinent family history. Laboratory: I have personally reviewed the critical care flowsheet and labs.      Recent Labs     12/31/18  1820   WBC 5.7   HGB 16.0   HCT 48.0        Recent Labs     12/31/18  1820      K 4.1      CO2 27   GLU 81   BUN 19   CREA 1.42*   CA 8.6   MG 2.0   ALB 3.5   SGOT 17   ALT 24       Objective:     Mode Rate Tidal Volume Pressure FiO2 PEEP                    Vital Signs:     TMAX(24)      Intake/Output:   Last shift:         Last 3 shifts: 01/01 0701 - 01/01 1900  In: -   Out: 350 [Urine:350]RRIOLAST3    Intake/Output Summary (Last 24 hours) at 1/1/2019 0843  Last data filed at 1/1/2019 0815  Gross per 24 hour   Intake 1901.37 ml   Output 2575 ml   Net -673.63 ml     EXAM:   GENERAL: well developed and in no distress, HEENT:  PERRL, EOMI, no alar flaring or epistaxis, oral mucosa moist without cyanosis, NECK:  no jugular vein distention, no retractions, no thyromegaly or masses, LUNGS: CTA, no w/r/r, HEART:  Regular rate and rhythm with no MGR; trace  edema is present, ABDOMEN:  soft with no tenderness, bowel sounds present in LLE, EXTREMITIES:  warm with no cyanosis, SKIN:  no jaundice or ecchymosis and NEUROLOGIC:  alert and oriented, grossly non-focal    Dean Blackwood MD  [facility]

## 2019-01-01 NOTE — ED NOTES
Bedside shift change report given to [facility] (oncoming nurse) by [facility]. Bess Thornton RN (offgoing nurse). Report included the following information SBAR.

## 2019-01-01 NOTE — PROGRESS NOTES
Bedside shift change report given to Kaylyn Segura RN (oncoming nurse) by Jennie Gimenez (offgoing nurse). Report included the following information SBAR, Kardex, Intake/Output and MAR. Patient alert and conversant. No signs of distress. VSS. TRANSFER - OUT REPORT: 
 
Verbal report given to [facility], RN (name) on Rea Steinberg  being transferred to [facility] (unit) for routine progression of care Report consisted of patients Situation, Background, Assessment and  
Recommendations(SBAR). Information from the following report(s) SBAR, Kardex, Intake/Output and MAR was reviewed with the receiving nurse. Lines:  
Peripheral IV 12/31/18 Right Antecubital (Active) Site Assessment Clean, dry, & intact 1/1/2019 12:00 PM  
Phlebitis Assessment 0 1/1/2019 12:00 PM  
Infiltration Assessment 0 1/1/2019 12:00 PM  
Dressing Status Clean, dry, & intact 1/1/2019 12:00 PM  
Dressing Type Transparent 1/1/2019 12:00 PM  
Hub Color/Line Status Pink 1/1/2019 12:00 PM  
Action Taken Open ports on tubing capped 1/1/2019 12:00 PM  
Alcohol Cap Used Yes 1/1/2019 12:00 PM  
   
Peripheral IV 12/31/18 Left Antecubital (Active) Site Assessment Clean, dry, & intact 1/1/2019 12:00 PM  
Phlebitis Assessment 0 1/1/2019 12:00 PM  
Infiltration Assessment 0 1/1/2019 12:00 PM  
Dressing Status Clean, dry, & intact 1/1/2019 12:00 PM  
Dressing Type Transparent 1/1/2019 12:00 PM  
Hub Color/Line Status Green 1/1/2019 12:00 PM  
Action Taken Open ports on tubing capped 1/1/2019 12:00 PM  
Alcohol Cap Used Yes 1/1/2019 12:00 PM  
  
 
Opportunity for questions and clarification was provided. Patient transported with: 
 Monitor Registered Nurse

## 2019-01-01 NOTE — ED NOTES
Report received from Bethany Tran, [address] [facility]. Assumed care of pt. Bed in locked and in low position with call bell within reach. Using AIDET - Introduced self as primary nurse and plan of care discussed with pt. Pt verbalizes understanding. Pt denies any additional complaints at this time. White board updated. Patient advised that medical information will be discussed and it is their responsibility to tell this nurse if such conversation should not take place in the presence of visitors. Pt verbalizes understanding. Patient's wife at the bedside. Patient on cardiac monitor, EKG machine and defib pads placed on chest. Patient is asymptomatic at this time.

## 2019-01-01 NOTE — ED NOTES
7:08 PM 
Change of shift. Care of patient taken over from Dr. Amarilis Roldan; H&P reviewed, bedside handoff complete. Awaiting response to metoprolol, returned to SVT with narrow complex with HR in 160s after adenosine Anticipate need for admission. 7:59 PM 
Patient had one brief episode of sinus rhythm with HR in 70's that lasted less than 30 seconds before returning to narrow complex tachycardia. Discussed with Dr. Johanne James, now on call for patient's cardiologist.  Discussed presentation and interventions to date. Recommend Amiodarone bolus and gtt and admit.

## 2019-01-01 NOTE — ED NOTES
Pt medicated with Adenosin 12m rapid IVP. Dr Carmona Roles in room. Pt had brief pause and returned to . Pt states \"I had a full feeling in my head for a second now its ok. \" BP stable.

## 2019-01-01 NOTE — ED NOTES
Amiodarone bolus completed and drip started. Patient  at the start of the drip, with a BP of 129/90. Patient medicated per order. Patient tolerated well. Patient updated on plan of care. Patient verbalized good understanding. Will continue to monitor. Call bell within reach.

## 2019-01-01 NOTE — H&P
[facility] Hospitalist Admission Note NAME:  Alicia Humphrey :   1933 MRN:  [id number] PCP:  Thuy Mooney MD  
 
Date/Time:  2018 9:20 PM 
 
  
  
Subjective: CHIEF COMPLAINT: palpitations HISTORY OF PRESENT ILLNESS:    
Mr. Marlene Mays is a 80 y.o.  male who presented to the Emergency Department complaining of palpitations. He reports intermittent palpitations for most of a year. He has been seen by his cardiologist, and was there earlier today. A Holter had been arranged. He returned home but had more persistent palpitations. He only has symptoms of WHITNEY and arm weakness when rate gets >160. Our ER finds a rhythm difficult to distinguish between abherent SVT vs Vtach. His cardiologist asked for amiodarone to be started and patient admitted. Past Medical History:  
Diagnosis Date  Arthritis  Hypercholesterolemia  Hypertension  SVT (supraventricular tachycardia) (HCC) Past Surgical History:  
Procedure Laterality Date  HX ORTHOPAEDIC  2/10 L shoulder replacement Social History Tobacco Use  Smoking status: Never Smoker  Smokeless tobacco: Never Used Substance Use Topics  Alcohol use: No  
  
 
History reviewed. No pertinent family history. Family hx cannot be fully assessed, due to the admitting conditions Allergies Allergen Reactions  Lipitor [Atorvastatin] Rash and Itching Prior to Admission medications Medication Sig Start Date End Date Taking? Authorizing Provider  
cholecalciferol (VITAMIN D3) 1,000 unit tablet Take 1,000 Units by mouth daily. Other, MD Mckayla  
metoprolol tartrate (LOPRESSOR) 25 mg tablet Take 25 mg by mouth two (2) times a day. Mckayla Harmon MD  
omeprazole (PRILOSEC) 20 mg capsule Take 20 mg by mouth daily. Mckayla Harmon MD  
rosuvastatin (CRESTOR) 5 mg tablet Take  by mouth nightly.     Provider, Historical  
 aspirin 81 mg chewable tablet Take 81 mg by mouth daily. Provider, Historical  
VIT A,C & E/NIAC/B2/LUT/MN/GLU (EYE-MARYA PO) Take  by mouth. Provider, Historical  
 
 
Review of Systems: 
(bold if positive, if negative) Gen:  Eyes:  ENT:  CVS:  PalpitationsPulm:  GI:   
:   
MS:  Skin:  Psych:  Endo:   
Hem:  Renal:   
Neuro:    
  
Objective: VITALS:   
Vital signs reviewed; most recent are: 
 
Visit Vitals BP (!) 135/93 Pulse (!) 135 Temp 98.3 °F (36.8 °C) Resp 18 Ht 5' 11\" (1.803 m) Wt 68.9 kg (152 lb) SpO2 95% BMI 21.20 kg/m² SpO2 Readings from Last 6 Encounters:  
12/31/18 95% 02/13/18 94% Intake/Output Summary (Last 24 hours) at 12/31/2018 2120 Last data filed at 12/31/2018 2103 Gross per 24 hour Intake 1000 ml Output 1125 ml Net -125 ml Exam:  
 
Physical Exam: 
 
Gen:  Frail, in no acute distress HEENT:  Pink conjunctivae, PERRL, hearing intact to voice, moist mucous membranes Neck:  Supple, without masses, thyroid non-tender Resp:  No accessory muscle use, clear breath sounds without wheezes rales or rhonchi 
Card:  No murmurs, irregular tachycardic S1, S2 without thrills, bruits or peripheral edema Abd:  Soft, non-tender, non-distended, normoactive bowel sounds are present, no mass Lymph:  No cervical or inguinal adenopathy Musc:  No cyanosis or clubbing Skin:  No rashes or ulcers, skin turgor is good Neuro:  Cranial nerves are grossly intact, general motor weakness, follows commands Psych:  Good insight, oriented to person, place and time, alert Labs: 
 
Recent Labs 12/31/18 
1820 WBC 5.7 HGB 16.0 HCT 48.0  Recent Labs 12/31/18 
1820   
K 4.1  CO2 27 GLU 81 BUN 19  
CREA 1.42* CA 8.6 MG 2.0 ALB 3.5 TBILI 0.5 SGOT 17 ALT 24 Lab Results Component Value Date/Time  Glucose (POC) 100 12/09/2010 07:27 AM  
 Glucose (POC) 148 (H) 12/08/2010 08:54 PM  
 
 No results for input(s): PH, PCO2, PO2, HCO3, FIO2 in the last 72 hours. No results for input(s): INR in the last 72 hours. No lab exists for component: INREXT All Micro Results None I have reviewed previous records Assessment and Plan:  
  
Palpitations / SVT (supraventricular tachycardia) / Hypertension - POA, cardiology will manage. Continue ASA and metoprolol. Cardiology requested amiodarone bolus and drip. Monitor in ICU overnight. He has no other active medical issues and we may ask cardiology to assume attending status. Renal insufficiency - Unclear baseline. Hydrate and monitor. Hypercholesterolemia - continue rosuvastatin Esophageal reflux - continue PPI Arthritis - Tylenol prn Telemetry reviewed:   SVT Risk of deterioration: high Total time spent with patient: 70 Minutes Care Plan discussed with: Patient, Nursing Staff and >50% of time spent in counseling and coordination of care Discussed:  Care Plan      
___________________________________________________ Attending Physician: Caridad Coleman MD

## 2019-01-01 NOTE — PROGRESS NOTES
[facility] 
[address] [facility], [place], [address] [place] [address] 
[phone] Medical Progress Note NAME: Mathew Stephens :  1933 MRM:  [id number] Date/Time: 2019  12:32 PM 
 
  
Assessment and Plan: 1. Palpitations / SVT (supraventricular tachycardia) / Hypertension - after amiodarone drip became bradycardic. Stopped amio and holding BB. Check echo which was done in the office on 18. He has no other active medical issues and we may ask cardiology to assume attending status. 
  
2. Renal insufficiency - Unclear baseline. Hydrate and monitor. 
  
3. Hypercholesterolemia - continue rosuvastatin 
  
4.  Esophageal reflux - continue PPI 
  
5. Arthritis - Tylenol prn 6. Murmur. Check echocardiogram  
 
  
Subjective: Chief Complaint:  Follow up of pt who was admitted with SVT. Now cate. Pt is asymptomatic. ROS: 
(bold if positive, if negative) Tolerating PT  Tolerating Diet Objective:  
 
Last 24hrs VS reviewed since prior progress note. Most recent are: 
 
Visit Vitals /70 Pulse (!) 50 Temp 97.9 °F (36.6 °C) Resp 19 Ht 5' 11\" (1.803 m) Wt 68.9 kg (152 lb) SpO2 99% BMI 21.20 kg/m² SpO2 Readings from Last 6 Encounters:  
19 99% 18 94% O2 Flow Rate (L/min): 3 l/min Intake/Output Summary (Last 24 hours) at 2019 1232 Last data filed at 2019 1200 Gross per 24 hour Intake 2351.37 ml Output 2575 ml Net -223.63 ml Physical Exam: 
 
Gen:  Well-developed, well-nourished, in no acute distress HEENT:  Pink conjunctivae, PERRL, hearing intact to voice, moist mucous membranes Neck:  Supple, without masses, thyroid non-tender Resp:  No accessory muscle use, clear breath sounds without wheezes rales or rhonchi 
Card:  Grade 4/6 systolic murmur, normal S1, S2 without thrills, bruits or peripheral edema Abd:  Soft, non-tender, non-distended, normoactive bowel sounds are present, no palpable organomegaly and no detectable hernias Lymph:  No cervical or inguinal adenopathy Musc:  No cyanosis or clubbing Skin:  No rashes or ulcers, skin turgor is good Neuro:  Cranial nerves are grossly intact, no focal motor weakness, follows commands appropriately Psych:  Good insight, oriented to person, place and time, alert 
__________________________________________________________________ Medications Reviewed: (see below) Medications:  
 
Current Facility-Administered Medications Medication Dose Route Frequency  aspirin chewable tablet 81 mg  81 mg Oral DAILY  metoprolol tartrate (LOPRESSOR) tablet 25 mg  25 mg Oral BID  rosuvastatin (CRESTOR) tablet 5 mg  5 mg Oral QHS  pantoprazole (PROTONIX) tablet 40 mg  40 mg Oral DAILY  0.9% sodium chloride infusion  75 mL/hr IntraVENous CONTINUOUS  
 acetaminophen (TYLENOL) tablet 650 mg  650 mg Oral Q4H PRN  
 diphenhydrAMINE (BENADRYL) capsule 25 mg  25 mg Oral Q4H PRN  
 ondansetron (ZOFRAN) injection 4 mg  4 mg IntraVENous Q4H PRN  
 enoxaparin (LOVENOX) injection 40 mg  40 mg SubCUTAneous DAILY  amiodarone (CORDARONE) 375 mg in dextrose 5% 250 mL infusion  1 mg/min IntraVENous TITRATE Lab Data Reviewed: (see below) Lab Review:  
 
Recent Labs 12/31/18 
1820 WBC 5.7 HGB 16.0 HCT 48.0  Recent Labs 12/31/18 
1820   
K 4.1  CO2 27 GLU 81 BUN 19  
CREA 1.42* CA 8.6 MG 2.0 ALB 3.5 TBILI 0.5 SGOT 17 ALT 24 Lab Results Component Value Date/Time Glucose (POC) 100 12/09/2010 07:27 AM  
 Glucose (POC) 148 (H) 12/08/2010 08:54 PM  
 Glucose (POC) 116 (H) 12/08/2010 05:40 PM  
 Glucose (POC) 128 (H) 12/08/2010 11:58 AM  
 Glucose (POC) 95 12/07/2010 07:55 AM  
 
No results for input(s): PH, PCO2, PO2, HCO3, FIO2 in the last 72 hours. No results for input(s): INR in the last 72 hours. No lab exists for component: INREXT All Micro Results None I have reviewed notes of prior 24hr. Other pertinent lab: Total time spent with patient: 28 Care Plan discussed with: Patient, Nursing Staff and >50% of time spent in counseling and coordination of care Discussed:  Care Plan Prophylaxis:  Hep SQ Disposition:  Home w/Family 
        
___________________________________________________ Attending Physician: Tor Monreal MD

## 2019-01-01 NOTE — ED NOTES
TRANSFER - OUT REPORT: 
 
Verbal report given to Kvng Still RN (name) on Alicia Humphrey  being transferred to [facility] (unit) for routine progression of care Report consisted of patients Situation, Background, Assessment and  
Recommendations(SBAR). Information from the following report(s) SBAR, ED Summary, Intake/Output, MAR, Recent Results and Cardiac Rhythm SVT was reviewed with the receiving nurse. Lines:  
Peripheral IV 12/31/18 Right Antecubital (Active) Site Assessment Clean, dry, & intact 12/31/2018  6:21 PM  
Phlebitis Assessment 0 12/31/2018  6:21 PM  
Infiltration Assessment 0 12/31/2018  6:21 PM  
Dressing Status Clean, dry, & intact 12/31/2018  6:21 PM  
Dressing Type Transparent 12/31/2018  6:21 PM  
Hub Color/Line Status Pink 12/31/2018  6:21 PM  
   
Peripheral IV 12/31/18 Left Antecubital (Active) Site Assessment Clean, dry, & intact 12/31/2018  6:40 PM  
Phlebitis Assessment 0 12/31/2018  6:40 PM  
Infiltration Assessment 0 12/31/2018  6:40 PM  
Dressing Status Clean, dry, & intact 12/31/2018  6:40 PM  
Dressing Type Tape;Transparent 12/31/2018  6:40 PM  
Hub Color/Line Status Green;Flushed 12/31/2018  6:40 PM  
Alcohol Cap Used Yes 12/31/2018  6:40 PM  
  
 
Opportunity for questions and clarification was provided. Patient transported with: 
 Monitor O2 @ 2 liters

## 2019-01-01 NOTE — PROGRESS NOTES
TRANSFER - IN REPORT: 
 
Verbal report received from ann(name) on Desmond Horton  being received from [facility](unit) for routine progression of care Report consisted of patients Situation, Background, Assessment and  
Recommendations(SBAR). Information from the following report(s) Kardex, ED Summary, Intake/Output, MAR, Recent Results and Cardiac Rhythm sinus cate was reviewed with the receiving nurse. Opportunity for questions and clarification was provided. Assessment completed upon patients arrival to unit and care assumed. Bedside and Verbal shift change report given to al (oncoming nurse) by Kim Banda (offgoing nurse). Report included the following information SBAR, Kardex, Intake/Output, MAR, Recent Results and Cardiac Rhythm sinus cate.

## 2019-01-01 NOTE — ED NOTES
Pt HR remains 's Pt medicated with Adenosine 6mg rapid IVP as ordered. Dr Kyra Jose in room. Pt had brief pause and immediatly returned to 's. Pt not symptomatic with dose given.

## 2019-01-01 NOTE — ED NOTES
Pt medicated with Lopressor 5mg IVP and given 50mg PO. 's after Lopressor. Pt continues to deny symptoms or chest pain at this time. Wife at bedside. Call bell in reach will continue to monitor.

## 2019-01-01 NOTE — CONSULTS
Luis Eli MD [facility]  Office [phone]      Date of  Admission: 12/31/2018  6:04 PM       Assessment/Plan:   · SVT: amio off and BB held due to bradycardia on tele. Admission 's. Tachy/cate syndrome. Will obtain ECHO done in Dr Lavon Gonzalez' s office 12/31. TSH nml. Lytes nml. Likely need to see EP. Further work up per Dr. Michelle Pandey tomorrow. OK for tele if ok with medical team.  · CAD: s/p CABG 3 yrs ago. Cont asa, hold BB for bradycardia. · HLD: cont statin   · GERD: on PPI  · Snoring/apneic episodes: OP sleep study         Pt personally seen and examined. Chart reviewed. Agree with advanced NP's history, exam and  A/P with changes/additons. Neck-no JVD  CVS-S1-S2 present,    2/6 systolic murmur present  RS-   CTAB       Abdomen-  Obese/soft/NT  LE-   trace edema    Tele reviewed. Discussed with patient/nursing/family ( wife/daughter)      Thank you for allowing us to participate in Sherif Phelps care. We will be happy to follow along. Please call for any questions. Dr. Michelle Pandey to follow from am.        Consult requested by Lindsay Sorto MD for SVT (supraventricular tachycardia) ([facility] [address]    Subjective: Sherif Phelps is a 80 y.o.   male  with PMH of CAD s/p CABG 3 yrs ago, HTn, HLD, GERD presented to the ED with complaint of palpitations . They have occurred on and off for approx 1 yr. He saw Mellissa Lawrence in the office yesterday ECHO was done and a cardiac monitor applied . He returned home but had more persistent palpitations. He only has symptoms of WHITNEY and arm weakness when rate gets >160. The patient denies chest pain, dependent edema, diaphoresis, WHITNEY, orthopnea,  PND, shortness of breath, claudication or syncope. No bleeding.        Patient has been treated with IV amiodarone and now in SR with bradycardia/missed beats    LABS:   Troponin:    0.05     Cardiographics: Telemetry: SB missed beats, PVC's   ECG: SB 1 st AVB    Cardiac Testing/ Procedures: A. Cardiac Cath/PCI:   B.ECHO/NOEMÍ:   C.StressNuclear/Stress ECHO/Stress test:   D.Vascular: CABG 3 yrs ago   E. EP:   F. Miscellaneous    SOCIAL HX:         Patient Active Problem List    Diagnosis Date Noted    Palpitations 12/31/2018    Arthritis     Hypercholesterolemia     Hypertension     SVT (supraventricular tachycardia) (HCC)     Esophageal reflux 12/04/2011      Past Medical History:   Diagnosis Date    Arthritis     Hypercholesterolemia     Hypertension     SVT (supraventricular tachycardia) (HCC)       Past Surgical History:   Procedure Laterality Date    HX ORTHOPAEDIC  2/10    L shoulder replacement     Allergies   Allergen Reactions    Lipitor [Atorvastatin] Rash and Itching      Current Facility-Administered Medications   Medication Dose Route Frequency    aspirin chewable tablet 81 mg  81 mg Oral DAILY    metoprolol tartrate (LOPRESSOR) tablet 25 mg  25 mg Oral BID    rosuvastatin (CRESTOR) tablet 5 mg  5 mg Oral QHS    pantoprazole (PROTONIX) tablet 40 mg  40 mg Oral DAILY    0.9% sodium chloride infusion  75 mL/hr IntraVENous CONTINUOUS    acetaminophen (TYLENOL) tablet 650 mg  650 mg Oral Q4H PRN    diphenhydrAMINE (BENADRYL) capsule 25 mg  25 mg Oral Q4H PRN    ondansetron (ZOFRAN) injection 4 mg  4 mg IntraVENous Q4H PRN    enoxaparin (LOVENOX) injection 40 mg  40 mg SubCUTAneous DAILY    amiodarone (CORDARONE) 375 mg in dextrose 5% 250 mL infusion  1 mg/min IntraVENous TITRATE          Review of Symptoms:  Constitutional: negative for fatigue and malaise  ENT: negative   Respiratory: negative for dyspnea on exertion  Gastrointestinal: positive for reflux symptoms  Genitourinary: no dysuria, hematuria, frequency   Musculoskeletal:negative for neck pain and back pain  Neurological: negative for memory problems  Other systems reviewed and negative except as above.   Social History Socioeconomic History    Marital status:      Spouse name: Not on file    Number of children: Not on file    Years of education: Not on file    Highest education level: Not on file   Tobacco Use    Smoking status: Never Smoker    Smokeless tobacco: Never Used   Substance and Sexual Activity    Alcohol use: No    Drug use: No    Sexual activity: Yes     Partners: Female     History reviewed. No pertinent family history. Physical Exam    Visit Vitals  /56   Pulse (!) 59   Temp 98.8 °F (37.1 °C)   Resp 21   Ht 5' 11\" (1.803 m)   Wt 152 lb (68.9 kg)   SpO2 96%   BMI 21.20 kg/m²     General: awake, alert, and oriented. MAEx4. No acute distress   HEENT Exam:     Normocephalic, atraumatic. Sclera anicteric . Neck: supple, no lymphadenopathy  Lung Exam:     Not  dyspneic. Respirations unlabored. O2 @ 96% room air Sat: . Lungs: No wheezes, crackles, rhonchi, or rubs heard on auscultation. Heart Exam:       PMI nondisplaced, well healed sternal incision. Rate: Rhythm: regular,   2/6 systolic murmur. No JVD,       Abdomen Exam:      obese, soft, nontender. + Bowel sounds. No palpable masses. : voiding  F=   Extremities Exam:      Atraumatic. No edema. Vascular Exam:      radial,  dorsalis pedis, posterior tibial, are equal and strong bilaterally     Neuro exam:  No focal deficits   Psy Exam: Normal affect, does not appear anxious or agitated        No results found for this or any previous visit (from the past 12 hour(s)).     Mckayla Tobar MS [facility]

## 2019-01-01 NOTE — PROGRESS NOTES
Problem: Falls - Risk of 
Goal: *Absence of Falls Document Yoli Shah Fall Risk and appropriate interventions in the flowsheet. Outcome: Progressing Towards Goal 
Fall Risk Interventions: 
Mobility Interventions: Bed/chair exit alarm, Patient to call before getting OOB Medication Interventions: Patient to call before getting OOB, Teach patient to arise slowly, Bed/chair exit alarm Elimination Interventions: Urinal in reach, Call light in reach, Bed/chair exit alarm

## 2019-01-02 LAB
ANION GAP SERPL CALC-SCNC: 10 MMOL/L (ref 5–15)
ATRIAL RATE: 163 BPM
ATRIAL RATE: 163 BPM
ATRIAL RATE: 72 BPM
BUN SERPL-MCNC: 14 MG/DL (ref 6–20)
BUN/CREAT SERPL: 13 (ref 12–20)
CALCIUM SERPL-MCNC: 8.1 MG/DL (ref 8.5–10.1)
CALCULATED P AXIS, ECG09: 97 DEGREES
CALCULATED R AXIS, ECG10: -21 DEGREES
CALCULATED R AXIS, ECG10: -23 DEGREES
CALCULATED R AXIS, ECG10: -25 DEGREES
CALCULATED T AXIS, ECG11: 119 DEGREES
CALCULATED T AXIS, ECG11: 122 DEGREES
CALCULATED T AXIS, ECG11: 39 DEGREES
CHLORIDE SERPL-SCNC: 108 MMOL/L (ref 97–108)
CO2 SERPL-SCNC: 23 MMOL/L (ref 21–32)
CREAT SERPL-MCNC: 1.05 MG/DL (ref 0.7–1.3)
DIAGNOSIS, 93000: NORMAL
GLUCOSE SERPL-MCNC: 88 MG/DL (ref 65–100)
P-R INTERVAL, ECG05: 218 MS
POTASSIUM SERPL-SCNC: 4 MMOL/L (ref 3.5–5.1)
Q-T INTERVAL, ECG07: 280 MS
Q-T INTERVAL, ECG07: 286 MS
Q-T INTERVAL, ECG07: 398 MS
QRS DURATION, ECG06: 76 MS
QRS DURATION, ECG06: 80 MS
QRS DURATION, ECG06: 86 MS
QTC CALCULATION (BEZET), ECG08: 435 MS
QTC CALCULATION (BEZET), ECG08: 462 MS
QTC CALCULATION (BEZET), ECG08: 470 MS
SODIUM SERPL-SCNC: 141 MMOL/L (ref 136–145)
VENTRICULAR RATE, ECG03: 163 BPM
VENTRICULAR RATE, ECG03: 164 BPM
VENTRICULAR RATE, ECG03: 72 BPM

## 2019-01-02 PROCEDURE — 65660000000 HC RM CCU STEPDOWN

## 2019-01-02 PROCEDURE — 36415 COLL VENOUS BLD VENIPUNCTURE: CPT

## 2019-01-02 PROCEDURE — 74011250636 HC RX REV CODE- 250/636: Performed by: INTERNAL MEDICINE

## 2019-01-02 PROCEDURE — 93005 ELECTROCARDIOGRAM TRACING: CPT

## 2019-01-02 PROCEDURE — 74011250637 HC RX REV CODE- 250/637: Performed by: INTERNAL MEDICINE

## 2019-01-02 PROCEDURE — 94760 N-INVAS EAR/PLS OXIMETRY 1: CPT

## 2019-01-02 PROCEDURE — 80048 BASIC METABOLIC PNL TOTAL CA: CPT

## 2019-01-02 RX ORDER — SODIUM CHLORIDE 0.9 % (FLUSH) 0.9 %
5-10 SYRINGE (ML) INJECTION EVERY 8 HOURS
Status: DISCONTINUED | OUTPATIENT
Start: 2019-01-02 | End: 2019-01-05 | Stop reason: HOSPADM

## 2019-01-02 RX ORDER — HYDRALAZINE HYDROCHLORIDE 20 MG/ML
10 INJECTION INTRAMUSCULAR; INTRAVENOUS
Status: DISCONTINUED | OUTPATIENT
Start: 2019-01-02 | End: 2019-01-05 | Stop reason: HOSPADM

## 2019-01-02 RX ORDER — SODIUM CHLORIDE 0.9 % (FLUSH) 0.9 %
5-10 SYRINGE (ML) INJECTION AS NEEDED
Status: DISCONTINUED | OUTPATIENT
Start: 2019-01-02 | End: 2019-01-05 | Stop reason: HOSPADM

## 2019-01-02 RX ORDER — LISINOPRIL 5 MG/1
10 TABLET ORAL DAILY
Status: DISCONTINUED | OUTPATIENT
Start: 2019-01-02 | End: 2019-01-03

## 2019-01-02 RX ADMIN — SODIUM CHLORIDE 75 ML/HR: 900 INJECTION, SOLUTION INTRAVENOUS at 04:25

## 2019-01-02 RX ADMIN — Medication 10 ML: at 21:23

## 2019-01-02 RX ADMIN — ENOXAPARIN SODIUM 40 MG: 40 INJECTION SUBCUTANEOUS at 09:03

## 2019-01-02 RX ADMIN — PANTOPRAZOLE SODIUM 40 MG: 40 TABLET, DELAYED RELEASE ORAL at 09:02

## 2019-01-02 RX ADMIN — LISINOPRIL 10 MG: 5 TABLET ORAL at 09:06

## 2019-01-02 RX ADMIN — ROSUVASTATIN CALCIUM 5 MG: 10 TABLET, FILM COATED ORAL at 21:23

## 2019-01-02 RX ADMIN — DIPHENHYDRAMINE HYDROCHLORIDE 25 MG: 25 CAPSULE ORAL at 21:23

## 2019-01-02 RX ADMIN — ASPIRIN 81 MG 81 MG: 81 TABLET ORAL at 09:01

## 2019-01-02 NOTE — PROGRESS NOTES
[facility] Montes [facility] 
[address] [facility], [place], [place], [address] [place] [address] 
[phone] Medical Progress Note NAME: Alex MELARA:  1933 MRM:  [id number] Date/Time: 2019  12:32 PM 
 
  
Assessment and Plan: 1. Palpitations / SVT (supraventricular tachycardia) / Hypertension - after amiodarone drip, pt became bradycardic. Stopped amio and holding BB. BP is high and started on lisinopril. Monitor . Check echo which was done in the office on 18. He has no other active medical issues and we may ask cardiology to assume attending status. 
  
2. Renal insufficiency - Unclear baseline. Hydrate and monitor. 
  
3. Hypercholesterolemia - continue rosuvastatin 
  
4.  Esophageal reflux - continue PPI 
  
5. Arthritis - Tylenol prn 6. Murmur. Check echocardiogram  
 
  
Subjective: Chief Complaint:  Follow up of pt who was admitted with SVT. Now cate. Pt is asymptomatic. ROS: 
(bold if positive, if negative) Tolerating PT  Tolerating Diet Objective:  
 
Last 24hrs VS reviewed since prior progress note. Most recent are: 
 
Visit Vitals /81 (BP 1 Location: Right arm, BP Patient Position: At rest;Supine; Head of bed elevated (Comment degrees)) Pulse (!) 59 Temp 97.9 °F (36.6 °C) Resp 18 Ht 5' 11\" (1.803 m) Wt 81 kg (178 lb 9.2 oz) SpO2 95% BMI 24.91 kg/m² SpO2 Readings from Last 6 Encounters:  
19 95% 18 94% O2 Flow Rate (L/min): 3 l/min Intake/Output Summary (Last 24 hours) at 2019 0710 Last data filed at 2019 3122 Gross per 24 hour Intake 1581.25 ml Output 1650 ml Net -68.75 ml Physical Exam: 
 
Gen:  Well-developed, well-nourished, in no acute distress HEENT:  Pink conjunctivae, PERRL, hearing intact to voice, moist mucous membranes Neck:  Supple, without masses, thyroid non-tender Resp:  No accessory muscle use, clear breath sounds without wheezes rales or rhonchi Card:  Grade 4/6 systolic murmur, normal S1, S2 without thrills, bruits or peripheral edema Abd:  Soft, non-tender, non-distended, normoactive bowel sounds are present, no palpable organomegaly and no detectable hernias Lymph:  No cervical or inguinal adenopathy Musc:  No cyanosis or clubbing Skin:  No rashes or ulcers, skin turgor is good Neuro:  Cranial nerves are grossly intact, no focal motor weakness, follows commands appropriately Psych:  Good insight, oriented to person, place and time, alert 
__________________________________________________________________ Medications Reviewed: (see below) Medications:  
 
Current Facility-Administered Medications Medication Dose Route Frequency  aspirin chewable tablet 81 mg  81 mg Oral DAILY  rosuvastatin (CRESTOR) tablet 5 mg  5 mg Oral QHS  pantoprazole (PROTONIX) tablet 40 mg  40 mg Oral DAILY  0.9% sodium chloride infusion  75 mL/hr IntraVENous CONTINUOUS  
 acetaminophen (TYLENOL) tablet 650 mg  650 mg Oral Q4H PRN  
 diphenhydrAMINE (BENADRYL) capsule 25 mg  25 mg Oral Q4H PRN  
 ondansetron (ZOFRAN) injection 4 mg  4 mg IntraVENous Q4H PRN  
 enoxaparin (LOVENOX) injection 40 mg  40 mg SubCUTAneous DAILY  amiodarone (CORDARONE) 375 mg in dextrose 5% 250 mL infusion  1 mg/min IntraVENous TITRATE Lab Data Reviewed: (see below) Lab Review:  
 
Recent Labs 12/31/18 
1820 WBC 5.7 HGB 16.0 HCT 48.0  Recent Labs 01/02/19 
0442 12/31/18 
1820  140  
K 4.0 4.1  105 CO2 23 27 GLU 88 81 BUN 14 19 CREA 1.05 1.42* CA 8.1* 8.6 MG  --  2.0 ALB  --  3.5 TBILI  --  0.5 SGOT  --  17 ALT  --  24 Lab Results Component Value Date/Time  Glucose (POC) 100 12/09/2010 07:27 AM  
 Glucose (POC) 148 (H) 12/08/2010 08:54 PM  
 Glucose (POC) 116 (H) 12/08/2010 05:40 PM  
 Glucose (POC) 128 (H) 12/08/2010 11:58 AM  
 Glucose (POC) 95 12/07/2010 07:55 AM  
 
 No results for input(s): PH, PCO2, PO2, HCO3, FIO2 in the last 72 hours. No results for input(s): INR in the last 72 hours. No lab exists for component: INREXT, INREXT All Micro Results None I have reviewed notes of prior 24hr. Other pertinent lab: Total time spent with patient: 28 Care Plan discussed with: Patient, Nursing Staff and >50% of time spent in counseling and coordination of care Discussed:  Care Plan Prophylaxis:  Hep SQ Disposition:  Home w/Family 
        
___________________________________________________ Attending Physician: Ashli Kirk MD

## 2019-01-02 NOTE — PROGRESS NOTES
[place], MD, [facility] 
[phone] IMPRESSION and RECOMMENDATIONS 1. Tachy-Haja:  Multiple episodes of SVT noted on event monitor: symptomatic. Becomes bradycardic into the 30's on metoprolol and/or amio. May need PPM.  Will ask Dr. Sheba Newton to see. Office records (including last note, echo, and event monitor tracing) faxed. 2.  CAD:  Stable. Continue ASA. Intolerant to lipitor. 3.  HTN:  Better on lisinopril. May need to uptitrate. I have discussed this plan with the patient. He appears to understand this plan and wishes to proceed ahead. Subjective: Pt without complaints. Objective:  
 
Patient Vitals for the past 16 hrs: 
 BP Temp Pulse Resp SpO2 Weight 01/02/19 1006 157/79       
01/02/19 0805 (!) 191/94 98.5 °F (36.9 °C) 66 18 94 %   
01/02/19 0700   64     
01/02/19 0615      81 kg (178 lb 9.2 oz) 01/02/19 0435 180/81 97.9 °F (36.6 °C) (!) 59 18 95 %   
01/02/19 0020 160/72  (!) 59     
01/01/19 2349 (!) 174/91 97.9 °F (36.6 °C) 62 18 95 %   
01/01/19 2300   69     
01/01/19 2115 168/76  60     
01/01/19 2019 186/84 97.8 °F (36.6 °C) 68 18 97 %   
 
 
HEENT Exam:   
 WNL Lung Exam:   
 The patient is not dyspneic. Breath sounds are heard equally in all lung fields. There are no wheezes, rales, rhonchi, or rubs heard on auscultation. Heart Exam: The rhythm is regular. The PMI is in the 5th intercostal space of the MCL. Apical impulse is normal. S1 is regular. S2 is physiologic. There is no S3, S4 gallop, click, or rub. 3/6 Early-peaking SM @ RUSB with diffuse radiation. Abdomen Exam:   
 Benign. Extremities Exam: No cyanosis, clubbing, edema. Distal pulses intact. Lab Results Component Value Date/Time  Glucose 88 01/02/2019 04:42 AM  
 Sodium 141 01/02/2019 04:42 AM  
 Potassium 4.0 01/02/2019 04:42 AM  
 Chloride 108 01/02/2019 04:42 AM  
 CO2 23 01/02/2019 04:42 AM  
 BUN 14 01/02/2019 04:42 AM  
 Creatinine 1.05 01/02/2019 04:42 AM  
 Calcium 8.1 (L) 01/02/2019 04:42 AM  
 
Recent Labs 12/31/18 
1820 WBC 5.7 HGB 16.0 HCT 48.0  Recent Labs 12/31/18 
1820 SGOT 17 ALT 24 AP 72 TBILI 0.5 TP 7.3 ALB 3.5 GLOB 3.8 No results for input(s): INR, PTP, APTT in the last 72 hours. No lab exists for component: INREXT Recent Labs 12/31/18 
1909 TNIPOC <0.04 No results for input(s): TROIQ in the last 72 hours. No results found for: CHOL, CHOLX, CHLST, CHOLV, HDL, LDL, LDLC, DLDLP, TGLX, TRIGL, TRIGP, CHHD, CHHDX Echo (12/31/18): EF 60%, DD. Mod dil LA. Mild to mod AR/MR/TR. Mild AS (PG 29, MG 15). Mild CA.

## 2019-01-02 NOTE — PROGRESS NOTES
Problem: Falls - Risk of 
Goal: *Absence of Falls Document Keely End Fall Risk and appropriate interventions in the flowsheet. Outcome: Progressing Towards Goal 
Fall Risk Interventions: 
Mobility Interventions: Patient to call before getting OOB Medication Interventions: Evaluate medications/consider consulting pharmacy, Patient to call before getting OOB Elimination Interventions: Bed/chair exit alarm, Call light in reach, Patient to call for help with toileting needs, Toileting schedule/hourly rounds, Urinal in reach

## 2019-01-02 NOTE — PROGRESS NOTES
Bedside and Verbal shift change report given to Thuy Clay RN (oncoming nurse) by Vannessa Gaitan RN (offgoing nurse). Report included the following information SBAR, Kardex, ED Summary, Intake/Output, Recent Results, Med Rec Status and Cardiac Rhythm SB/ST/NSR. 
 
2300: Pt alert, oriented, denies pain. Pt c/o pacer pads causing discomfort; itching. Administered Benadryl 25 mg po. 
 
2330: Pt reported feeling much better; denies itching, resting comfortably. 04:35 Pt resting comfortably, denies pain. HR at 59, decreased to 42 then to 32 for two seconds before returning to 59 and 60 within one minute. Pt asymptomatic, denied chest pain, skin warm, dry. Pacer pads in place. Bedside and Verbal shift change report given to Vannessa Gaitan RN (oncoming nurse) by Thuy Clay RN (offgoing nurse). Report included the following information SBAR, Kardex, ED Summary, Intake/Output, Recent Results, Med Rec Status and Cardiac Rhythm SB/NSR.

## 2019-01-02 NOTE — PROGRESS NOTES
Bedside and Verbal shift change report given to [facility] (oncoming nurse) by Amelia Hillman (offgoing nurse). Report included the following information SBAR, Kardex, ED Summary, Intake/Output, MAR, Recent Results and Cardiac Rhythm sinus cate. . 
 
Dr Jeniffer Shelton made aware of overnight HR 32, elevated BP. PRN hydralazine order placed, IVF dcd.   
 
Omelia Larve Dr Samuel Aguayo notified of low HR overnight, elevated BP, new lisinopril and prn hydralazine. No new orders at this time. [address] with Dr Noam Chatman office called, MD will round on pt after 1700. Pt and family informed 1120 per Aleena Arredondo with Dr Noam Chatman office, pt will have pacer placement tomorrow 1530, will round on pt this evening.

## 2019-01-02 NOTE — PROGRESS NOTES
Bedside and Verbal shift change report given to kathi (oncoming nurse) by Ros Gao (offgoing nurse). Report included the following information SBAR, Kardex, Intake/Output, MAR, Recent Results, Med Rec Status and Cardiac Rhythm sinusbrady. 12 Dr Macarena Coleman notified of the elevated BP and slow heart rate overnight. Will notify Cards. 1850 Telemetry informed of heart rate of 174. Pt attended immediately. Pt was using the bathroom. Pt walked back to his bed. Pt asymptomatic. Valsalva manouver performed. HR immediately slowed to 98. EKG performed. Dr Tenna Lennox paged. Bedside and Verbal shift change report given to major sanchez (oncoming nurse) by Marlena Banda (offgoing nurse). Report included the following information SBAR, Kardex, ED Summary, Procedure Summary, Intake/Output, MAR, Recent Results and Cardiac Rhythm NSR.

## 2019-01-02 NOTE — CONSULTS
I spoke to Dr Shane Para  Will see him later this evening after procedures at [facility] [place]  If he needs pacemaker, plan for 330 pm tomorrow  I have talked to EP lab nurses, that is the best time for [facility]

## 2019-01-03 ENCOUNTER — APPOINTMENT (OUTPATIENT)
Dept: GENERAL RADIOLOGY | Age: 84
DRG: 244 | End: 2019-01-03
Attending: INTERNAL MEDICINE
Payer: MEDICARE

## 2019-01-03 PROBLEM — Z95.0 PACEMAKER: Status: ACTIVE | Noted: 2019-01-03

## 2019-01-03 LAB
ATRIAL RATE: 77 BPM
CALCULATED P AXIS, ECG09: 57 DEGREES
CALCULATED R AXIS, ECG10: -21 DEGREES
CALCULATED T AXIS, ECG11: 30 DEGREES
DIAGNOSIS, 93000: NORMAL
P-R INTERVAL, ECG05: 190 MS
Q-T INTERVAL, ECG07: 416 MS
QRS DURATION, ECG06: 84 MS
QTC CALCULATION (BEZET), ECG08: 470 MS
VENTRICULAR RATE, ECG03: 77 BPM

## 2019-01-03 PROCEDURE — C1785 PMKR, DUAL, RATE-RESP: HCPCS | Performed by: INTERNAL MEDICINE

## 2019-01-03 PROCEDURE — 77030012935 HC DRSG AQUACEL BMS -B

## 2019-01-03 PROCEDURE — 74011000250 HC RX REV CODE- 250: Performed by: INTERNAL MEDICINE

## 2019-01-03 PROCEDURE — 77030018729 HC ELECTRD DEFIB PAD CARD -B

## 2019-01-03 PROCEDURE — 74011250636 HC RX REV CODE- 250/636: Performed by: INTERNAL MEDICINE

## 2019-01-03 PROCEDURE — 77030018547 HC SUT ETHBND1 J&J -B

## 2019-01-03 PROCEDURE — C1893 INTRO/SHEATH, FIXED,NON-PEEL: HCPCS

## 2019-01-03 PROCEDURE — 74011250637 HC RX REV CODE- 250/637: Performed by: INTERNAL MEDICINE

## 2019-01-03 PROCEDURE — 99152 MOD SED SAME PHYS/QHP 5/>YRS: CPT | Performed by: INTERNAL MEDICINE

## 2019-01-03 PROCEDURE — 02H63JZ INSERTION OF PACEMAKER LEAD INTO RIGHT ATRIUM, PERCUTANEOUS APPROACH: ICD-10-PCS | Performed by: INTERNAL MEDICINE

## 2019-01-03 PROCEDURE — 65660000000 HC RM CCU STEPDOWN

## 2019-01-03 PROCEDURE — 33208 INSRT HEART PM ATRIAL & VENT: CPT

## 2019-01-03 PROCEDURE — 77030031139 HC SUT VCRL2 J&J -A

## 2019-01-03 PROCEDURE — 94760 N-INVAS EAR/PLS OXIMETRY 1: CPT

## 2019-01-03 PROCEDURE — 99153 MOD SED SAME PHYS/QHP EA: CPT | Performed by: INTERNAL MEDICINE

## 2019-01-03 PROCEDURE — 77030018673

## 2019-01-03 PROCEDURE — C1898 LEAD, PMKR, OTHER THAN TRANS: HCPCS | Performed by: INTERNAL MEDICINE

## 2019-01-03 PROCEDURE — 74011250637 HC RX REV CODE- 250/637: Performed by: SPECIALIST

## 2019-01-03 PROCEDURE — 71045 X-RAY EXAM CHEST 1 VIEW: CPT

## 2019-01-03 PROCEDURE — A4565 SLINGS: HCPCS

## 2019-01-03 PROCEDURE — 02HK3JZ INSERTION OF PACEMAKER LEAD INTO RIGHT VENTRICLE, PERCUTANEOUS APPROACH: ICD-10-PCS | Performed by: INTERNAL MEDICINE

## 2019-01-03 PROCEDURE — 0JH606Z INSERTION OF PACEMAKER, DUAL CHAMBER INTO CHEST SUBCUTANEOUS TISSUE AND FASCIA, OPEN APPROACH: ICD-10-PCS | Performed by: INTERNAL MEDICINE

## 2019-01-03 RX ORDER — SODIUM CHLORIDE 0.9 % (FLUSH) 0.9 %
5-10 SYRINGE (ML) INJECTION AS NEEDED
Status: DISCONTINUED | OUTPATIENT
Start: 2019-01-03 | End: 2019-01-05 | Stop reason: HOSPADM

## 2019-01-03 RX ORDER — CEFAZOLIN SODIUM/WATER 2 G/20 ML
2 SYRINGE (ML) INTRAVENOUS ONCE
Status: COMPLETED | OUTPATIENT
Start: 2019-01-03 | End: 2019-01-03

## 2019-01-03 RX ORDER — CEPHALEXIN 250 MG/1
500 CAPSULE ORAL 3 TIMES DAILY
Status: DISCONTINUED | OUTPATIENT
Start: 2019-01-04 | End: 2019-01-05 | Stop reason: HOSPADM

## 2019-01-03 RX ORDER — LISINOPRIL 20 MG/1
20 TABLET ORAL DAILY
Status: DISCONTINUED | OUTPATIENT
Start: 2019-01-03 | End: 2019-01-05 | Stop reason: HOSPADM

## 2019-01-03 RX ORDER — SODIUM CHLORIDE 0.9 % (FLUSH) 0.9 %
5-10 SYRINGE (ML) INJECTION EVERY 8 HOURS
Status: DISCONTINUED | OUTPATIENT
Start: 2019-01-03 | End: 2019-01-05 | Stop reason: HOSPADM

## 2019-01-03 RX ORDER — CEFAZOLIN SODIUM/WATER 2 G/20 ML
2 SYRINGE (ML) INTRAVENOUS EVERY 8 HOURS
Status: COMPLETED | OUTPATIENT
Start: 2019-01-03 | End: 2019-01-04

## 2019-01-03 RX ORDER — BACITRACIN 50000 [IU]/1
INJECTION, POWDER, FOR SOLUTION INTRAMUSCULAR
Status: DISPENSED
Start: 2019-01-03 | End: 2019-01-04

## 2019-01-03 RX ORDER — HEPARIN SODIUM 200 [USP'U]/100ML
500 INJECTION, SOLUTION INTRAVENOUS ONCE
Status: COMPLETED | OUTPATIENT
Start: 2019-01-03 | End: 2019-01-03

## 2019-01-03 RX ORDER — MIDAZOLAM HYDROCHLORIDE 1 MG/ML
.5-1 INJECTION, SOLUTION INTRAMUSCULAR; INTRAVENOUS
Status: DISCONTINUED | OUTPATIENT
Start: 2019-01-03 | End: 2019-01-03 | Stop reason: HOSPADM

## 2019-01-03 RX ORDER — HEPARIN SODIUM 200 [USP'U]/100ML
500 INJECTION, SOLUTION INTRAVENOUS ONCE
Status: DISCONTINUED | OUTPATIENT
Start: 2019-01-03 | End: 2019-01-03 | Stop reason: HOSPADM

## 2019-01-03 RX ORDER — LIDOCAINE HYDROCHLORIDE 10 MG/ML
60 INJECTION INFILTRATION; PERINEURAL
Status: DISCONTINUED | OUTPATIENT
Start: 2019-01-03 | End: 2019-01-03 | Stop reason: HOSPADM

## 2019-01-03 RX ORDER — NALOXONE HYDROCHLORIDE 0.4 MG/ML
0.4 INJECTION, SOLUTION INTRAMUSCULAR; INTRAVENOUS; SUBCUTANEOUS AS NEEDED
Status: DISCONTINUED | OUTPATIENT
Start: 2019-01-03 | End: 2019-01-05 | Stop reason: HOSPADM

## 2019-01-03 RX ORDER — VANCOMYCIN HYDROCHLORIDE 1 G/20ML
1 INJECTION, POWDER, LYOPHILIZED, FOR SOLUTION INTRAVENOUS ONCE
Status: COMPLETED | OUTPATIENT
Start: 2019-01-03 | End: 2019-01-03

## 2019-01-03 RX ORDER — FENTANYL CITRATE 50 UG/ML
25-200 INJECTION, SOLUTION INTRAMUSCULAR; INTRAVENOUS
Status: DISCONTINUED | OUTPATIENT
Start: 2019-01-03 | End: 2019-01-03 | Stop reason: HOSPADM

## 2019-01-03 RX ORDER — METOPROLOL SUCCINATE 50 MG/1
50 TABLET, EXTENDED RELEASE ORAL DAILY
Status: DISCONTINUED | OUTPATIENT
Start: 2019-01-03 | End: 2019-01-05 | Stop reason: HOSPADM

## 2019-01-03 RX ADMIN — ACETAMINOPHEN 650 MG: 325 TABLET, FILM COATED ORAL at 01:15

## 2019-01-03 RX ADMIN — Medication 10 ML: at 05:10

## 2019-01-03 RX ADMIN — Medication 10 ML: at 21:59

## 2019-01-03 RX ADMIN — SODIUM CHLORIDE: 900 INJECTION, SOLUTION INTRAVENOUS at 15:56

## 2019-01-03 RX ADMIN — HEPARIN SODIUM IN SODIUM CHLORIDE 1000 UNITS: 200 INJECTION INTRAVENOUS at 15:41

## 2019-01-03 RX ADMIN — FENTANYL CITRATE 50 MCG: 50 INJECTION, SOLUTION INTRAMUSCULAR; INTRAVENOUS at 15:40

## 2019-01-03 RX ADMIN — ROSUVASTATIN CALCIUM 5 MG: 10 TABLET, FILM COATED ORAL at 21:59

## 2019-01-03 RX ADMIN — MIDAZOLAM 2 MG: 1 INJECTION INTRAMUSCULAR; INTRAVENOUS at 15:40

## 2019-01-03 RX ADMIN — Medication 10 ML: at 17:00

## 2019-01-03 RX ADMIN — PANTOPRAZOLE SODIUM 40 MG: 40 TABLET, DELAYED RELEASE ORAL at 08:54

## 2019-01-03 RX ADMIN — ASPIRIN 81 MG 81 MG: 81 TABLET ORAL at 08:54

## 2019-01-03 RX ADMIN — Medication 2 G: at 21:59

## 2019-01-03 RX ADMIN — METOPROLOL SUCCINATE 50 MG: 50 TABLET, FILM COATED, EXTENDED RELEASE ORAL at 18:24

## 2019-01-03 RX ADMIN — Medication 10 ML: at 14:00

## 2019-01-03 RX ADMIN — MIDAZOLAM 1 MG: 1 INJECTION INTRAMUSCULAR; INTRAVENOUS at 15:56

## 2019-01-03 RX ADMIN — MIDAZOLAM 1 MG: 1 INJECTION INTRAMUSCULAR; INTRAVENOUS at 15:44

## 2019-01-03 RX ADMIN — LISINOPRIL 20 MG: 20 TABLET ORAL at 08:54

## 2019-01-03 RX ADMIN — VANCOMYCIN HYDROCHLORIDE 1000 MG: 1 INJECTION, POWDER, LYOPHILIZED, FOR SOLUTION INTRAVENOUS at 15:59

## 2019-01-03 RX ADMIN — Medication 2 G: at 15:40

## 2019-01-03 RX ADMIN — ACETAMINOPHEN 650 MG: 325 TABLET, FILM COATED ORAL at 23:28

## 2019-01-03 RX ADMIN — LIDOCAINE HYDROCHLORIDE 60 ML: 10 INJECTION, SOLUTION INFILTRATION; PERINEURAL at 15:44

## 2019-01-03 NOTE — PROGRESS NOTES
Bedside and Verbal shift change report given to Shira (oncoming nurse) by Racheal Amezquita (offgoing nurse). Report included the following information SBAR, Kardex, Intake/Output, MAR, Recent Results and Cardiac Rhythm nsr, sinus cate, sinus arrhythmia, SVT. Pt to be NPO at 1030 for pacer placement today. 0800 Dr Wagner Duty on floor, notified of overnight events 1514 pt off floor to EP lab, tele notified 1635 report called by Alvaro, can resume diet,  dual pacer already interrogated bedside. Left chest pacer placement settings 60 low 130 high, currently aflutter 115 will start metoprolol this pm, VSS, received 2g ancef 4 mg versed 50 fent. RN will release post cath orders incl diet, abx & cxr.  
 
1706 pt back on floor, tele notified, VSS aflutter 107. L arm in sling, advised not to use. Bedside and Verbal shift change report given to Racheal Amezquita (oncoming nurse) by Mat (offgoing nurse). Report included the following information SBAR, Kardex, Procedure Summary, Intake/Output, MAR, Recent Results and Cardiac Rhythm aflutter.

## 2019-01-03 NOTE — PROGRESS NOTES
Problem: Falls - Risk of 
Goal: *Absence of Falls Document Mabel Márquez Fall Risk and appropriate interventions in the flowsheet. Outcome: Progressing Towards Goal 
Fall Risk Interventions: 
Mobility Interventions: Assess mobility with egress test, Bed/chair exit alarm, Patient to call before getting OOB, PT Consult for assist device competence Medication Interventions: Assess postural VS orthostatic hypotension, Bed/chair exit alarm, Teach patient to arise slowly, Patient to call before getting OOB Elimination Interventions: Bed/chair exit alarm, Call light in reach, Patient to call for help with toileting needs, Toileting schedule/hourly rounds

## 2019-01-03 NOTE — CONSULTS
Cardiac Electrophysiology Consultation Note   REFERRING PROVIDER: Dr Marie Wen  Primary cardiologist Dr Ammon Abdullahi  Subjective: Mathew Stephens is a 80 y.o. patient who is seen for evaluation of PSVT and sinus bradycardia  Dr Marie Wen indicated that he had sinus bradycardia with toprol and amiodarone in the past  He felt palpitation to his neck, confirmed by wife and daughter tonight  He did not have syncope or dizziness  External loop recorder showed wide complex tachycardia that was likely short RP SVT with aberrancy   He has had PVCs  There are episodes of PSVT and short RP SVTs with rate to 170 bpm  He had CABG in 2015  Echo with normal LVEF 55%, LAE, MR, TR, AR and AS-mild to moderate ranges       Patient Active Problem List   Diagnosis Code    Esophageal reflux K21.9    Arthritis M19.90    Hypercholesterolemia E78.00    Hypertension I10    SVT (supraventricular tachycardia) (HCC) I47.1    Palpitations R00.2     No current facility-administered medications on file prior to encounter. Current Outpatient Medications on File Prior to Encounter   Medication Sig Dispense Refill    cholecalciferol (VITAMIN D3) 1,000 unit tablet Take 1,000 Units by mouth daily.  metoprolol tartrate (LOPRESSOR) 25 mg tablet Take 25 mg by mouth two (2) times a day.  omeprazole (PRILOSEC) 20 mg capsule Take 20 mg by mouth daily.  rosuvastatin (CRESTOR) 5 mg tablet Take  by mouth nightly.  aspirin 81 mg chewable tablet Take 81 mg by mouth daily.  VIT A,C & E/NIAC/B2/LUT/MN/GLU (EYE-MARYA PO) Take  by mouth.          Current Facility-Administered Medications   Medication Dose Route Frequency Provider Last Rate Last Dose    hydrALAZINE (APRESOLINE) 20 mg/mL injection 10 mg  10 mg IntraVENous Q2H PRN Schuyler oRdriguez MD        lisinopril (PRINIVIL, ZESTRIL) tablet 10 mg  10 mg Oral DAILY Schuyler Rodriguez MD   10 mg at 01/02/19 0906    sodium chloride (NS) flush 5-10 mL  5-10 mL IntraVENous Q8H Josesito Beavers MD        sodium chloride (NS) flush 5-10 mL  5-10 mL IntraVENous PRN Josesito Beavers MD        aspirin chewable tablet 81 mg  81 mg Oral DAILY Luann Hunt MD   81 mg at 01/02/19 0901    rosuvastatin (CRESTOR) tablet 5 mg  5 mg Oral QHS Luann Hunt MD   5 mg at 01/01/19 2306    pantoprazole (PROTONIX) tablet 40 mg  40 mg Oral DAILY Luann Hunt MD   40 mg at 01/02/19 0902    acetaminophen (TYLENOL) tablet 650 mg  650 mg Oral Q4H PRN Luann Hunt MD        diphenhydrAMINE (BENADRYL) capsule 25 mg  25 mg Oral Q4H PRN Luann Hunt MD   25 mg at 01/01/19 2307    ondansetron (ZOFRAN) injection 4 mg  4 mg IntraVENous Q4H PRN Luann Hunt MD        amiodarone (CORDARONE) 375 mg in dextrose 5% 250 mL infusion  1 mg/min IntraVENous TITRATE Luann Hunt MD   Stopped at 01/01/19 4084     Allergies   Allergen Reactions    Lipitor [Atorvastatin] Rash and Itching     Past Medical History:   Diagnosis Date    Arthritis     Hypercholesterolemia     Hypertension     SVT (supraventricular tachycardia) (HCC)      Past Surgical History:   Procedure Laterality Date    HX ORTHOPAEDIC  2/10    L shoulder replacement     Family history: mother with pacer   Social History     Tobacco Use    Smoking status: Never Smoker    Smokeless tobacco: Never Used   Substance Use Topics    Alcohol use: No        Review of Systems:   Constitutional: Negative for fever, chills, weight loss, + malaise/fatigue with slow heart rate. HEENT: Negative for nosebleeds, vision changes. Respiratory: Negative for cough, hemoptysis  Cardiovascular: Negative for chest pain, +palpitations, no orthopnea, claudication, leg swelling, syncope, and PND. Gastrointestinal: Negative for nausea, vomiting, diarrhea, blood in stool and melena. Genitourinary: Negative for dysuria, and hematuria. Musculoskeletal: Negative for myalgias, arthralgia. Skin: Negative for rash.    Heme: Does not bleed or bruise easily. Neurological: Negative for speech change and focal weakness     Objective:     Visit Vitals  /81 (BP 1 Location: Right arm, BP Patient Position: At rest)   Pulse 69   Temp 98 °F (36.7 °C)   Resp 18   Ht 5' 11\" (1.803 m)   Wt 178 lb 9.2 oz (81 kg)   SpO2 96%   BMI 24.91 kg/m²      Physical Exam:   Constitutional: well-developed and well-nourished. No respiratory distress. Head: Normocephalic and atraumatic. Eyes: Pupils are equal, round  ENT: hearing normal  Neck: supple. No JVD present. Cardiovascular: Normal rate, regular rhythm. Exam reveals no gallop and no friction rub. 2/6 systolic LSB murmur heard. Pulmonary/Chest: Effort normal and breath sounds normal. No wheezes. Abdominal: Soft, no tenderness. Musculoskeletal: no edema. Neurological: alert,oriented. Skin: Skin is warm and dry healed sternal wound  Psychiatric: normal mood and affect.  Behavior is normal. Judgment and thought content normal.      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, but cannot rule out atrial tachycardia or atrial flutter   PVCs  Hx of sinus bradycardia on beta blocker and amiodarone  CABG  Hypertension    He and his wife and daughter agree to dual chamber pacemaker implant and then resume beta blocker  If PSVT cannot be controlled with beta blocker I can recommend ablation  They agree to proceed   Follow up in future on remote and in clinic checks  My contact and office # address given to him  Hold heparin SC tomorrow  May have breakfast in am and pacer at 330 pm  May go home after pacer 6 pm or next day am    Risks involve device implant include but are not limited to bleeding, infection, valvular damage, heart attack, stroke, lung collapse (pneumothorax or hemothorax), heart collapse (pericardial tamponade), heart perforation, kidney failure, death. Elective or emergency surgery may be required to repair some of these complications. Prolonged hospitalization would be required. General anesthesia may be required for the procedure. Thank you for involving me in this patient's care and please call with further concerns or questions. Ministerio Tavarez M.D.   Electrophysiology/Cardiology  [facility]  [address], [address] [place] [address]  [phone]

## 2019-01-03 NOTE — PROGRESS NOTES
Pt HR went to 174 while ambulating to bathroom. Valsalva maneuver brought HR to 80s, pt asymptomatic. EKG done per protocol. Cardiology paged, Dr Jennifer Little given update, no new orders. Dr Charmayne Angst to round on pt this evening. 26 Dr Charmayne Angst at bedside, updated re: wide complex tach. Verbal order to hold am dose lovenox.

## 2019-01-03 NOTE — PROGRESS NOTES
1900 
Bedside and Verbal shift change report given to [address] [place] [address] (oncoming nurse) by Pratima Singh RN (offgoing nurse). Report included the following information SBAR, Kardex, Procedure Summary, Intake/Output, MAR, Accordion and Recent Results. Patient on bed. Denies any pain. Had an episode of SVT 10 mins ago. Denies any symptoms. Initial assessment done. Dr. Marissa Baker on the bedside. Call bell with patient and aware to call before getting out of bed.  
 
2258 Patient had another episode of SVT lasted for 8 secs. Dr. John Felix aware. Ordered to not call unless patient is in pain or symptomatic. Visit Vitals /76 (BP 1 Location: Left arm, BP Patient Position: At rest) Pulse 69 Temp 98.3 °F (36.8 °C) Resp 18 Ht 5' 11\" (1.803 m) Wt 81 kg (178 lb 9.2 oz) SpO2 92% BMI 24.91 kg/m²  
 
0700 Bedside and Verbal shift change report given to Pavel Finnegan RN (oncoming nurse) by Leo Vidal RN (offgoing nurse). Report included the following information SBAR, Kardex, Procedure Summary, Intake/Output, MAR, Accordion and Recent Results.

## 2019-01-03 NOTE — PROCEDURES
Cardiac Procedure Note   Patient: Toy Pascual  MRN: [id number]  SSN: [id number]   YOB: 1933 Age: 80 y.o.   Sex: male    Date of Procedure: 1/3/2019   Pre-procedure Diagnosis: paroxysmal atrial flutter RVR, sinus bradycardia with medications  Post-procedure Diagnosis: same  Procedure: Permanent Pacemaker Insertion  :  Dr. Tiffany Oliva MD    Assistant(s):  None  Anesthesia: Moderate Sedation   Estimated Blood Loss: Less than 10 mL   Specimens Removed: None  Findings: atrial flutter RVR   Complications: None   Implants: dual chamber MRI pacer [facility]  Signed by:  Tiffany Oliva MD  1/3/2019  4:14 PM

## 2019-01-03 NOTE — PROGRESS NOTES
Abhay Gonsales MD, [facility] 
[phone] IMPRESSION and RECOMMENDATIONS 1. Tachy-Haja:  Multiple episodes of SVT noted on event monitor: symptomatic. Becomes bradycardic into the 30's on metoprolol and/or amio. Appreciate Dr. Nikole Toribio help. PPM today at ~3:30pm.  Resume metoprolol after this. If has recurrent episodes, consider amio vs ablation. Hopefully home tomorrow. 2.  CAD:  Stable. Continue ASA. Intolerant to lipitor. 3.  HTN:  Increase lisinopril. I have discussed this plan with the patient. He appears to understand this plan and wishes to proceed ahead. Subjective: Pt without complaints. Objective:  
 
Patient Vitals for the past 16 hrs: 
 BP Temp Pulse Resp SpO2 Weight 01/03/19 0420      80.6 kg (177 lb 9.6 oz) 01/03/19 0415 168/76 98.3 °F (36.8 °C) 69 18 92 %   
01/02/19 2338 170/79 98.2 °F (36.8 °C) 72 18 95 %   
01/02/19 2121   72     
01/02/19 1943 142/81 98 °F (36.7 °C) 69 18 96 %   
01/02/19 1856 185/90 97.8 °F (36.6 °C) 71 20 96 %   
 
 
HEENT Exam:   
 WNL Lung Exam:   
 The patient is not dyspneic. Breath sounds are heard equally in all lung fields. There are no wheezes, rales, rhonchi, or rubs heard on auscultation. Heart Exam: The rhythm is regular. The PMI is in the 5th intercostal space of the MCL. Apical impulse is normal. S1 is regular. S2 is physiologic. There is no S3, S4 gallop, click, or rub. 3/6 Early-peaking SM @ RUSB with diffuse radiation. Abdomen Exam:   
 Benign. Extremities Exam: No cyanosis, clubbing, edema. Distal pulses intact. Lab Results Component Value Date/Time  Glucose 88 01/02/2019 04:42 AM  
 Sodium 141 01/02/2019 04:42 AM  
 Potassium 4.0 01/02/2019 04:42 AM  
 Chloride 108 01/02/2019 04:42 AM  
 CO2 23 01/02/2019 04:42 AM  
 BUN 14 01/02/2019 04:42 AM  
 Creatinine 1.05 01/02/2019 04:42 AM  
 Calcium 8.1 (L) 01/02/2019 04:42 AM  
 
Recent Labs 12/31/18 
1820 WBC 5.7 HGB 16.0 HCT 48.0  Recent Labs 12/31/18 
1820 SGOT 17 ALT 24 AP 72 TBILI 0.5 TP 7.3 ALB 3.5 GLOB 3.8 No results for input(s): INR, PTP, APTT in the last 72 hours. No lab exists for component: INREXT, INREXT Recent Labs 12/31/18 
1909 TNIPOC <0.04 No results for input(s): TROIQ in the last 72 hours. No results found for: CHOL, CHOLX, CHLST, CHOLV, HDL, LDL, LDLC, DLDLP, TGLX, TRIGL, TRIGP, CHHD, CHHDX Echo (12/31/18): EF 60%, DD. Mod dil LA. Mild to mod AR/MR/TR. Mild AS (PG 29, MG 15). Mild NJ.

## 2019-01-03 NOTE — PROGRESS NOTES
TRANSFER - IN REPORT: 
 
Verbal report received from Aba Durand RN(name) on Mathew Stephens  being received from [facility](unit) for routine post - op Report consisted of patients Situation, Background, Assessment and  
Recommendations(SBAR). Information from the following report(s) Procedure Summary was reviewed with the receiving nurse. Opportunity for questions and clarification was provided. Assessment completed upon patients arrival to unit and care assumed. 4:42 PM 
TRANSFER - OUT REPORT: 
 
Verbal report given to [facility]  RN(name) on Mathew Stephens  being transferred to [facility](unit) for routine progression of care Report consisted of patients Situation, Background, Assessment and  
Recommendations(SBAR). Information from the following report(s) SBAR, Procedure Summary and Cardiac Rhythm A Flutter with RVR was reviewed with the receiving nurse. Lines:  
Peripheral IV 12/31/18 Left Antecubital (Active) Site Assessment Clean, dry, & intact 1/3/2019  8:53 AM  
Phlebitis Assessment 0 1/3/2019  8:53 AM  
Infiltration Assessment 0 1/3/2019  8:53 AM  
Dressing Status Clean, dry, & intact 1/3/2019  8:53 AM  
Dressing Type Transparent 1/3/2019  8:53 AM  
Hub Color/Line Status Green;Capped;Flushed 1/3/2019  8:53 AM  
Action Taken Open ports on tubing capped 1/3/2019  8:53 AM  
Alcohol Cap Used Yes 1/3/2019  8:53 AM  
  
 
Opportunity for questions and clarification was provided. Patient transported with: @ 4:50 PM 
Monitor Registered Nurse

## 2019-01-04 PROCEDURE — 74011250637 HC RX REV CODE- 250/637: Performed by: INTERNAL MEDICINE

## 2019-01-04 PROCEDURE — 74011250636 HC RX REV CODE- 250/636: Performed by: INTERNAL MEDICINE

## 2019-01-04 PROCEDURE — 94760 N-INVAS EAR/PLS OXIMETRY 1: CPT

## 2019-01-04 PROCEDURE — 65660000000 HC RM CCU STEPDOWN

## 2019-01-04 PROCEDURE — 74011250636 HC RX REV CODE- 250/636: Performed by: SPECIALIST

## 2019-01-04 PROCEDURE — 74011250637 HC RX REV CODE- 250/637: Performed by: SPECIALIST

## 2019-01-04 PROCEDURE — 74011000258 HC RX REV CODE- 258: Performed by: SPECIALIST

## 2019-01-04 RX ORDER — DIGOXIN 125 MCG
0.12 TABLET ORAL DAILY
Status: DISCONTINUED | OUTPATIENT
Start: 2019-01-05 | End: 2019-01-05 | Stop reason: HOSPADM

## 2019-01-04 RX ORDER — METOPROLOL SUCCINATE 50 MG/1
50 TABLET, EXTENDED RELEASE ORAL ONCE
Status: COMPLETED | OUTPATIENT
Start: 2019-01-04 | End: 2019-01-04

## 2019-01-04 RX ORDER — DIGOXIN 0.25 MG/ML
250 INJECTION INTRAMUSCULAR; INTRAVENOUS
Status: COMPLETED | OUTPATIENT
Start: 2019-01-04 | End: 2019-01-04

## 2019-01-04 RX ORDER — METOPROLOL SUCCINATE 50 MG/1
50 TABLET, EXTENDED RELEASE ORAL DAILY
Qty: 30 TAB | Refills: 5 | Status: SHIPPED | OUTPATIENT
Start: 2019-01-04 | End: 2020-10-28

## 2019-01-04 RX ORDER — DIGOXIN 0.25 MG/ML
250 INJECTION INTRAMUSCULAR; INTRAVENOUS ONCE
Status: COMPLETED | OUTPATIENT
Start: 2019-01-04 | End: 2019-01-04

## 2019-01-04 RX ADMIN — Medication 2 G: at 07:08

## 2019-01-04 RX ADMIN — CEPHALEXIN 500 MG: 250 CAPSULE ORAL at 08:10

## 2019-01-04 RX ADMIN — Medication 10 ML: at 07:08

## 2019-01-04 RX ADMIN — Medication 10 ML: at 21:13

## 2019-01-04 RX ADMIN — Medication 10 ML: at 17:07

## 2019-01-04 RX ADMIN — METOPROLOL SUCCINATE 50 MG: 50 TABLET, FILM COATED, EXTENDED RELEASE ORAL at 17:43

## 2019-01-04 RX ADMIN — CEPHALEXIN 500 MG: 250 CAPSULE ORAL at 17:06

## 2019-01-04 RX ADMIN — DIGOXIN 250 MCG: 0.25 INJECTION INTRAMUSCULAR; INTRAVENOUS at 14:05

## 2019-01-04 RX ADMIN — DIGOXIN 250 MCG: 0.25 INJECTION INTRAMUSCULAR; INTRAVENOUS at 17:43

## 2019-01-04 RX ADMIN — LISINOPRIL 20 MG: 20 TABLET ORAL at 08:09

## 2019-01-04 RX ADMIN — ASPIRIN 81 MG 81 MG: 81 TABLET ORAL at 08:09

## 2019-01-04 RX ADMIN — METOPROLOL SUCCINATE 50 MG: 50 TABLET, FILM COATED, EXTENDED RELEASE ORAL at 11:09

## 2019-01-04 RX ADMIN — CEPHALEXIN 500 MG: 250 CAPSULE ORAL at 21:13

## 2019-01-04 RX ADMIN — ROSUVASTATIN CALCIUM 5 MG: 10 TABLET, FILM COATED ORAL at 21:13

## 2019-01-04 RX ADMIN — PANTOPRAZOLE SODIUM 40 MG: 40 TABLET, DELAYED RELEASE ORAL at 08:09

## 2019-01-04 RX ADMIN — DEXTROSE MONOHYDRATE 150 MG: 5 INJECTION, SOLUTION INTRAVENOUS at 14:00

## 2019-01-04 NOTE — PROGRESS NOTES
Spiritual Care Assessment/Progress Note [address] 
 
 
NAME: Nicolás Garcia      MRN: [id number] AGE: 80 y.o. SEX: male Lutheran Affiliation: Mennonite Language: [place] 1/4/2019     Total Time (in minutes): 10 Spiritual Assessment begun in SF 3 PRO CARE TELE 2 through conversation with: 
  
    [x]Patient        [x] Family    [] Friend(s) Reason for Consult: Initial/Spiritual assessment, patient floor Spiritual beliefs: (Please include comment if needed) [x] Identifies with a cirilo tradition: Mennonite    
   [] Supported by a cirilo community:        
   [] Claims no spiritual orientation:       
   [] Seeking spiritual identity:            
   [] Adheres to an individual form of spirituality:       
   [] Not able to assess:                   
 
    
Identified resources for coping:  
   [] Prayer                           
   [] Music                  [] Guided Imagery [x] Family/friends                 [] Pet visits [] Devotional reading                         [] Unknown 
   [] Other:                                         
 
 
Interventions offered during this visit: (See comments for more details) Patient Interventions: Affirmation of emotions/emotional suffering, Coping skills reviewed/reinforced, Iconic (affirming the presence of God/Higher Power), Catharsis/review of pertinent events in supportive environment Family/Friend(s): Coping skills reviewed/reinforced, Affirmation of cirilo, Iconic (affirming the presence of God/Higher Power)(Family) Plan of Care: 
 
 [x] Support spiritual and/or cultural needs  
 [] Support AMD and/or advance care planning process    
 [] Support grieving process 
 [] Coordinate Rites and/or Rituals  
 [] Coordination with community clergy [] No spiritual needs identified at this time 
 [] Detailed Plan of Care below (See Comments)  [] Make referral to Music Therapy 
[] Make referral to Pet Therapy [] Make referral to Addiction services 
[] Make referral to [facility] 
[] Make referral to Spiritual Care Partner 
[] No future visits requested       
[] Follow up visits as needed Comments: Patient lying in bed resting, family at bedside. Good eye contact, smiling, friendly, good sense of humor. Says he is feeling better. Spoke about the events leading to this hospitalization. Spoke briefly about his present thoughts, feelings, and concerns. Has good family support. Described a strong cirilo. His hope is for his heart rate to be controlled so that he can return home. Appeared encouraged as a result of this visit and expressed gratitude for this visit. Visited by Rev. Prabhjot García, [address]  paging service: [phone])

## 2019-01-04 NOTE — PROGRESS NOTES
Nutrition Assessment: 
 
RECOMMENDATIONS/INTERVENTION(S):  
1. Continue with Cardiac Diet 2. Will monitor PO intakes, weight, labs. ASSESSMENT:  
1/4:  81 yo male admitted for SVT. RD assessment for LOS. PMHx: HTN, hypercholesterolemia. Weight WNL per BMI per age. Pt denies any recent weight changes, confirmed by weight hx in medical records. Cardiac diet ordered. Intakes have been good since admission, pt eating 100% the meals. Reports good appetite at home as well. No difficulty chewing/swallowing. Labs and meds reviewed. Diet Order: Cardiac 
% Eaten:   
Patient Vitals for the past 72 hrs: 
 % Diet Eaten 01/02/19 1700 100 % 01/02/19 1200 100 % 01/02/19 0805 100 % Pertinent Medications: [x] Reviewed Labs: [x] Reviewed Anthropometrics: Height: 5' 11\" (180.3 cm) Weight: 79 kg (174 lb 1.6 oz) IBW (%IBW):   ( ) UBW (%UBW):   (  %) BMI: Body mass index is 24.28 kg/m². This BMI is indicative of: 
 [] Underweight    [x] Normal    [] Overweight    []  Obesity    []  Extreme Obesity (BMI>40) Estimated Nutrition Needs (Based on): 1945 Kcals/day(BMR 1496 x AF 1.3) , 63 g(63-79gm (0.8-1gm/kg/d)) Protein Carbohydrate: At Least 130 g/day  Fluids: 1945 mL/day (1 ml/kcal) Last BM: unknown  [x]Active     []Hyperactive  []Hypoactive       [] Absent   BS Skin:    [] Intact   [] Incision  [] Breakdown   [] DTI   [] Tears/Excoriation/Abrasion  [x]Edema- trace BLE  [] Other: Wt Readings from Last 30 Encounters:  
01/04/19 79 kg (174 lb 1.6 oz) 02/13/18 77.1 kg (170 lb)  
02/28/12 78.5 kg (173 lb)  
02/24/12 78.3 kg (172 lb 9.6 oz) 12/02/11 79.1 kg (174 lb 6.4 oz)  
07/26/10 76.9 kg (169 lb 9.6 oz) NUTRITION DIAGNOSES:  
Problem:  No nutritional diagnosis at this time Etiology: related to Signs/Symptoms: as evidenced by NUTRITION INTERVENTIONS: 
Meals/Snacks: General/healthful diet GOAL:  
Consume > 75% all meals in next 5-7 days Cultural, Confucianist, or Ethnic Dietary Needs: None EDUCATION & DISCHARGE NEEDS:  
 [x] None Identified 
 [] Identified and Education Provided/Documented 
 [] Identified and Pt declined/was not appropriate [x] Interdisciplinary Care Plan Reviewed/Documented  
 [x] Discharge Needs:    Continue Cardiac diet at home 
 [] No Nutrition Related Discharge Needs NUTRITION RISK:  
Pt Is At Nutrition Risk  [x] No Nutrition Risk Identified  [] PT SEEN FOR:  
 []  MD Consult: []Calorie Count []Diabetic Diet Education []Diet Education []Electrolyte Management []General Nutrition Management and Supplements []Management of Tube Feeding []TPN Recommendations []  RN Referral:  []MST score >=2 
   []Enteral/Parenteral Nutrition PTA []Pregnant: Gestational DM or Multigestation  
              [] Pressure Ulcer 
 
[]  Low BMI      [x]  Length of Stay       [] Dysphagia Diet         [] Ventilator 
[]  Follow-up Previous Recommendations: 
 [] Implemented          [] Not Implemented          [x] Not Applicable Previous Goal: 
 [] Met              [] Progressing Towards Goal              [] Not Progressing Towards Goal   [x] Not Applicable Enma Mcdowell RD Pager [phone] Phone [phone]

## 2019-01-04 NOTE — PROGRESS NOTES
Pacemaker check showed proper function Future Appointments Date Time Provider Floyd Robert 1/11/2019  8:45 AM [facility]  
1/11/2019  9:00 AM Wound check, [address]

## 2019-01-04 NOTE — PROGRESS NOTES
Reason for Admission:   SVT 
                
RRAT Score:          10 Plan for utilizing home health:      Not opposed if recommended Likelihood of Readmission:  Green/Low Transition of Care Plan:    Pt lives with his wife Michael Buckley [phone] in a two story home but they stay on the first level of the home. Pt does not drive, he relies on his wife for transportation. Pt has prescription coverage under her insurance plan, he gets his prescriptions filled at [address]. Pt's PCP is Dr. Ai Meredith. Pt has never had home health before and does not have any DME. No other issues or concerns at this time. WENDY Hernandez Care Management Interventions PCP Verified by CM: Yes MyChart Signup: No 
Discharge Durable Medical Equipment: No 
Physical Therapy Consult: No 
Occupational Therapy Consult: No 
Speech Therapy Consult: No 
Current Support Network: Lives with Spouse Confirm Follow Up Transport: Family Plan discussed with Pt/Family/Caregiver: Yes Discharge Location Discharge Placement: Home

## 2019-01-04 NOTE — DISCHARGE INSTRUCTIONS
PATIENT INSTRUCTIONS POST-PACEMAKER IMPLANT    1. No heavy lifting or exercises with the left arm for 4 weeks. This includes the following:  Do not raise arm above the shoulder level to comb hair, pull on clothes, etc... Do not use the affected arm to pull up or push up from a seated or laying  down position. Do not allow anyone else to pull on the affected arm. 2. Remove dressing in a week in office  Do not wet or shower on incision site    3. Do not drive for 3 days    4. Call Dr. Michelle Espinoza at [phone] if you experience any of the following symptoms:  1. Redness at the pacemaker site  2. Swelling at or around the pacemaker or in the left arm  3. Pain around the pacemaker  4. Dizziness, lightheadedness, fainting spells  5. Lack of energy  6. Shortness of breath  7. Rapid heart rate  8. Chest or muscle twitches    5. Follow-up with  Future Appointments   Date Time Provider Floyd Robert   1/11/2019  8:45 AM PACEMAKER3, [address]   1/11/2019  9:00 AM Wound check, [address]     6. You may use pain medication and ice pack for pain relief as needed. You may wear the sling as a reminder to keep your arm below the your shoulder. Medora Goodpasture, M.D. [facility]  Electrophysiology/Cardiology  [facility] [place]  [address], [address] [place] [address], [address] [place], [place] [id number] [phone]               Supraventricular Tachycardia: Care Instructions  Your Care Instructions    Having supraventricular tachycardia (SVT) means that from time to time your heart beats abnormally fast. This fast rhythm is caused by changes in the electrical system of your heart. You may feel a fluttering in your chest (palpitations) and have a fast pulse.  When your heart is beating fast, you may feel anxious and lightheaded, be short of breath, and feel discomfort in the chest.  Your doctor may prescribe medicines to help slow down your heartbeat. Your doctor may also suggest you try vagal maneuvers when having an episode of SVT. These are things, like bearing down, that might help slow your heart rate. Bearing down means that you try to breathe out with your stomach muscles but you don't let air out of your nose or mouth. Your doctor can show you how to do vagal maneuvers. He or she may suggest you lie down on your back to do them. In some cases, either cardioversion treatment or a procedure called catheter ablation is done to correct SVT. Your doctor may ask you to wear a small electronic device for 1 or 2 days to monitor your heart. It is called a Holter monitor. Follow-up care is a key part of your treatment and safety. Be sure to make and go to all appointments, and call your doctor if you are having problems. It's also a good idea to know your test results and keep a list of the medicines you take. How can you care for yourself at home? · Be safe with medicines. Take your medicines exactly as prescribed. Call your doctor if you think you are having a problem with your medicine. You will get more details on the specific medicines your doctor prescribes. · If your doctor showed you how to do vagal maneuvers, try them when you have an episode. These maneuvers include bearing down or putting an ice-cold, wet towel on your face. · Monitor your condition by keeping a diary of your SVT episodes. Bring this to your doctor appointments. ? Write down how fast or slow your heart was beating. To count your heart rate:  § Gently place 2 fingers of your hand on the inside of your other wrist, below your thumb. § Count the beats for 30 seconds. § Then, double the result to get the number of beats per minute. ? Write down if your heart rhythm was regular or irregular.   ? Write down the symptoms you had.  ? Write down the time of day your symptoms occurred. ? Write down how long your symptoms lasted. ? Write down what you were doing when your symptoms started. ? Write down what may have helped your symptoms go away. · If they trigger episodes, limit or avoid alcohol or drinks with caffeine. · Do not use over-the-counter decongestants, herbal remedies, diet pills, or \"pep\" pills, which often contain stimulants. · Do not use illegal drugs, such as cocaine, ecstasy, or methamphetamine, which can speed up your heart's rhythm. · Do not smoke. Smoking can make this condition worse. If you need help quitting, talk to your doctor about stop-smoking programs and medicines. These can increase your chances of quitting for good. · Be alert for new or worsening symptoms, such as shortness of breath, pounding of your heart, or unusual tiredness. If new symptoms develop or your symptoms become worse, call your doctor. When should you call for help? Call 911 anytime you think you may need emergency care. For example, call if:    · You passed out (lost consciousness).     · You are short of breath.    Call your doctor now or seek immediate medical care if:    · You have a fast heartbeat.     · You are dizzy or lightheaded, or feel like you may faint.    Watch closely for changes in your health, and be sure to contact your doctor if:    · You do not get better as expected. Where can you learn more? Go to http://morteza-jan.info/. Enter G244 in the search box to learn more about \"Supraventricular Tachycardia: Care Instructions. \"  Current as of: December 6, 2017  Content Version: 11.8  © 2657-6803 [employer]. Care instructions adapted under license by [employer] (which disclaims liability or warranty for this information).  If you have questions about a medical condition or this instruction, always ask your healthcare professional. [place] [address] any warranty or liability for your use of this information.

## 2019-01-04 NOTE — PROGRESS NOTES
1900 
Bedside and Verbal shift change report given to Ana Mclean (oncoming nurse) by Festus Bacon (offgoing nurse). Report included the following information SBAR, Kardex, Procedure Summary, Intake/Output, MAR, Accordion and Recent Results. Patient on bed with visitors on the bedside. No complaints of pain. Initial assessment done. Visit Vitals /80 (BP 1 Location: Right arm, BP Patient Position: At rest) Pulse (!) 113 Temp 98 °F (36.7 °C) Resp 16 Ht 5' 11\" (1.803 m) Wt 79 kg (174 lb 1.6 oz) SpO2 93% BMI 24.28 kg/m²  
 
0700 Bedside and Verbal shift change report given to Scott Tee (oncoming nurse) by Ta Jacobsen RN (offgoing nurse). Report included the following information SBAR, Kardex, Procedure Summary, Intake/Output, MAR, Accordion and Recent Results.

## 2019-01-05 VITALS
RESPIRATION RATE: 16 BRPM | HEART RATE: 62 BPM | HEIGHT: 71 IN | SYSTOLIC BLOOD PRESSURE: 141 MMHG | WEIGHT: 173.7 LBS | OXYGEN SATURATION: 94 % | DIASTOLIC BLOOD PRESSURE: 76 MMHG | TEMPERATURE: 97.9 F | BODY MASS INDEX: 24.32 KG/M2

## 2019-01-05 PROCEDURE — 74011250637 HC RX REV CODE- 250/637: Performed by: INTERNAL MEDICINE

## 2019-01-05 PROCEDURE — 74011250637 HC RX REV CODE- 250/637: Performed by: SPECIALIST

## 2019-01-05 PROCEDURE — 94760 N-INVAS EAR/PLS OXIMETRY 1: CPT

## 2019-01-05 RX ORDER — CEPHALEXIN 500 MG/1
500 CAPSULE ORAL 3 TIMES DAILY
Qty: 11 CAP | Refills: 0 | Status: SHIPPED | OUTPATIENT
Start: 2019-01-05 | End: 2019-01-09

## 2019-01-05 RX ADMIN — Medication 10 ML: at 05:56

## 2019-01-05 RX ADMIN — ASPIRIN 81 MG 81 MG: 81 TABLET ORAL at 09:02

## 2019-01-05 RX ADMIN — PANTOPRAZOLE SODIUM 40 MG: 40 TABLET, DELAYED RELEASE ORAL at 09:02

## 2019-01-05 RX ADMIN — CEPHALEXIN 500 MG: 250 CAPSULE ORAL at 09:01

## 2019-01-05 RX ADMIN — DIGOXIN 0.12 MG: 125 TABLET ORAL at 09:02

## 2019-01-05 RX ADMIN — LISINOPRIL 20 MG: 20 TABLET ORAL at 09:01

## 2019-01-05 NOTE — PROGRESS NOTES
1900 
Bedside and Verbal shift change report given to Jeremiah Phoenix (oncoming nurse) by Kenzie Roldan (offgoing nurse). Report included the following information SBAR, Kardex, Procedure Summary, Intake/Output, MAR, Accordion and Recent Results. Patient on bed. Initial assessment done. Still complaining of shoulder stiffness. Ice pack on the left shoulder. Visit Vitals /67 (BP 1 Location: Right arm, BP Patient Position: At rest) Pulse 61 Temp 98.4 °F (36.9 °C) Resp 17 Ht 5' 11\" (1.803 m) Wt 79 kg (174 lb 1.6 oz) SpO2 93% BMI 24.28 kg/m²  
 
0700 Bedside and Verbal shift change report given to Lisette Guererro (oncoming nurse) by Xiang Chase RN (offgoing nurse). Report included the following information SBAR, Kardex, Procedure Summary, Intake/Output, MAR, Accordion and Recent Results.

## 2019-01-05 NOTE — PROGRESS NOTES
Problem: Falls - Risk of 
Goal: *Absence of Falls Document [place] Fall Risk and appropriate interventions in the flowsheet. Outcome: Progressing Towards Goal 
Fall Risk Interventions: 
Mobility Interventions: Assess mobility with egress test, Bed/chair exit alarm, Patient to call before getting OOB, PT Consult for assist device competence Medication Interventions: Assess postural VS orthostatic hypotension, Bed/chair exit alarm, Patient to call before getting OOB, Teach patient to arise slowly Elimination Interventions: Bed/chair exit alarm, Call light in reach, Patient to call for help with toileting needs

## 2019-01-05 NOTE — PROGRESS NOTES
Cardiology Daily Progress Note Jacki Wagner is a 80 y.o. male admitted with SVT tachy cate. S/p PPM. Doing well. Wants to go home. Visit Vitals /69 (BP 1 Location: Right arm, BP Patient Position: At rest;Head of bed elevated (Comment degrees)) Pulse 69 Temp 98.9 °F (37.2 °C) Resp 16 Ht 5' 11\" (1.803 m) Wt 173 lb 11.2 oz (78.8 kg) SpO2 94% BMI 24.23 kg/m² Intake/Output Summary (Last 24 hours) at 1/5/2019 1151 Last data filed at 1/5/2019 1365 Gross per 24 hour Intake 260 ml Output 300 ml Net -40 ml General appearance - alert, well appearing, and in no distress Mental status - affect appropriate to mood Eyes - sclera anicteric, moist mucous membranes Neck - supple, no significant adenopathy Chest - clear to auscultation, no wheezes, rales or rhonchi Heart - normal rate, regular rhythm, normal S1, S2, no murmurs, rubs, clicks or gallops Abdomen - soft, nontender, nondistended, no masses or organomegaly Extremities - peripheral pulses normal, no pedal edema Current Facility-Administered Medications Medication Dose Route Frequency  digoxin (LANOXIN) tablet 0.125 mg  0.125 mg Oral DAILY  lisinopril (PRINIVIL, ZESTRIL) tablet 20 mg  20 mg Oral DAILY  sodium chloride (NS) flush 5-10 mL  5-10 mL IntraVENous Q8H  
 sodium chloride (NS) flush 5-10 mL  5-10 mL IntraVENous PRN  
 naloxone (NARCAN) injection 0.4 mg  0.4 mg IntraVENous PRN  
 metoprolol succinate (TOPROL-XL) XL tablet 50 mg  50 mg Oral DAILY  cephALEXin (KEFLEX) capsule 500 mg  500 mg Oral TID  hydrALAZINE (APRESOLINE) 20 mg/mL injection 10 mg  10 mg IntraVENous Q2H PRN  
 sodium chloride (NS) flush 5-10 mL  5-10 mL IntraVENous Q8H  
 sodium chloride (NS) flush 5-10 mL  5-10 mL IntraVENous PRN  
 aspirin chewable tablet 81 mg  81 mg Oral DAILY  rosuvastatin (CRESTOR) tablet 5 mg  5 mg Oral QHS  pantoprazole (PROTONIX) tablet 40 mg  40 mg Oral DAILY  acetaminophen (TYLENOL) tablet 650 mg  650 mg Oral Q4H PRN  
 diphenhydrAMINE (BENADRYL) capsule 25 mg  25 mg Oral Q4H PRN  
 ondansetron (ZOFRAN) injection 4 mg  4 mg IntraVENous Q4H PRN No specialty comments available. Assessment: - SVT 
- Tachy- Haja syndrome - PPM 
- HTN Plan: - D/c home. - FU with Dr. Wanger Duty. ___________________________________________________ Sushil Kauffman MD, [facility] [place]

## 2019-01-11 ENCOUNTER — OFFICE VISIT (OUTPATIENT)
Dept: CARDIOLOGY CLINIC | Age: 84
End: 2019-01-11

## 2019-01-11 ENCOUNTER — CLINICAL SUPPORT (OUTPATIENT)
Dept: CARDIOLOGY CLINIC | Age: 84
End: 2019-01-11

## 2019-01-11 DIAGNOSIS — R00.1 BRADYCARDIA: Primary | ICD-10-CM

## 2019-01-11 DIAGNOSIS — Z95.0 PACEMAKER: ICD-10-CM

## 2019-01-11 DIAGNOSIS — Z95.0 CARDIAC PACEMAKER IN SITU: Primary | ICD-10-CM

## 2019-01-11 NOTE — PROGRESS NOTES
Cardiac Electrophysiology Wound Check Note      Wound Check   Patient is here for wound check from pacemaker implant. No fever, drainage   Physical Exam   Constitutional: well-developed and well-nourished. Skin: Left side pocket is healing without redness, drainage, hematoma. The wound is intact and edges approximated. Aquacel dressing and steri strips removed. ASSESSMENT and PLAN   The incision is healing without redness, drainage, hematoma. Site intact. The patient has finished their anti-biotic and been compliant with arm restrictions. They will continue arms restrictions for 3 more weeks. current treatment plan is effective, no change in therapy   Device check shows proper lead and generator functions  Gave patient assistance forms for xarelto and advised them to complete and have Dr Jodi Marinelli send in for assistance.     Follow-up Disposition:  Return 3 months I will check via device clinic or remote monitoring in the future

## 2019-01-13 ENCOUNTER — DOCUMENTATION ONLY (OUTPATIENT)
Dept: CARDIOLOGY CLINIC | Age: 84
End: 2019-01-13

## 2019-01-13 NOTE — PROGRESS NOTES
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 30 Tab 5    rivaroxaban (XARELTO) 20 mg tab tablet Take 1 Tab by mouth daily (with dinner). 30 Tab 5    cholecalciferol (VITAMIN D3) 1,000 unit tablet Take 1,000 Units by mouth daily.  omeprazole (PRILOSEC) 20 mg capsule Take 20 mg by mouth daily.  rosuvastatin (CRESTOR) 5 mg tablet Take  by mouth nightly.  VIT A,C & E/NIAC/B2/LUT/MN/GLU (EYE-MARYA PO) Take  by mouth. No current facility-administered medications on file prior to visit.

## 2019-01-14 NOTE — PROGRESS NOTES
Spoke with patient and scheduled an appointment with Dr. Noam Chatman. Pt verbalized understanding and denies any further questions at this time.      Future Appointments   Date Time Provider Floyd Robert   2/21/2019  9:40 AM Kashmir Mckay  [address] 4/18/2019 11:15 AM Layne Pulido, [address] [facility]   4/18/2019 11:20 AM Kashmir Mckay  [address]

## 2019-01-25 ENCOUNTER — TELEPHONE (OUTPATIENT)
Dept: CARDIOLOGY CLINIC | Age: 84
End: 2019-01-25

## 2019-01-25 NOTE — TELEPHONE ENCOUNTER
Verified patient with two types of identifiers. Wife called back stating that pulse was down to normal now and that they are not going to the ED. They would like to know what to do if this happens again. Will check with MD/NP.

## 2019-01-25 NOTE — TELEPHONE ENCOUNTER
Pt wife called in and stated that pt HR has been 150 since 1:30 pm. Pt has tried different things at home to make it go down that have worked in the past but did not work today per wife. Spoke with Dr. Maximilian Lee and he stated to have the pt go to [facility] Emergency room for treatment. Called patient wife back and informed her to take him to [facility] emergency room.  She verbalized understanding

## 2019-01-27 ENCOUNTER — DOCUMENTATION ONLY (OUTPATIENT)
Dept: CARDIOLOGY CLINIC | Age: 84
End: 2019-01-27

## 2019-01-28 ENCOUNTER — TELEPHONE (OUTPATIENT)
Dept: CARDIOLOGY CLINIC | Age: 84
End: 2019-01-28

## 2019-01-28 RX ORDER — DIGOXIN 125 MCG
0.12 TABLET ORAL DAILY
Qty: 30 TAB | Refills: 5 | Status: SHIPPED | OUTPATIENT
Start: 2019-01-28 | End: 2019-01-28 | Stop reason: SDUPTHER

## 2019-01-28 RX ORDER — DIGOXIN 125 MCG
0.12 TABLET ORAL DAILY
Qty: 30 TAB | Refills: 5 | Status: SHIPPED | OUTPATIENT
Start: 2019-01-28 | End: 2019-03-13

## 2019-01-28 NOTE — TELEPHONE ENCOUNTER
Patient called stating that he is at the pharmacy and his script is not there. Verified pharmacy that we have on file and he does not use [facility] anymore. He uses [facility]. Resent script into pharmacy.

## 2019-01-28 NOTE — TELEPHONE ENCOUNTER
Requested Prescriptions     Signed Prescriptions Disp Refills    digoxin (LANOXIN) 0.125 mg tablet 30 Tab 5     Sig: Take 1 Tab by mouth daily.      Authorizing Provider: Rachel Carmen     Ordering User: Marcel Coughlin       Per Dr. Dipesh Og   Date Time Provider Floyd Yesica   2/21/2019  9:40 AM Ben Casey  [address] 4/18/2019 11:15 AM Fariha Loyd, [address] [facility]   4/18/2019 11:20 AM Ben Casey  [address]

## 2019-01-28 NOTE — PROGRESS NOTES
Wife called as his  bpm transiently on Friday night so he did not go to ER  Will add digoxin 0.125 mg daily  Recommend EP study and ablation of PSVT

## 2019-01-28 NOTE — TELEPHONE ENCOUNTER
Spoke with wife.  Patient has an appointment 2/21/19 and tentative procedure scheduled for 3/13/19 @ 8 am.

## 2019-01-28 NOTE — TELEPHONE ENCOUNTER
Josesito Beavers MD 12 hours ago (8:31 PM)         Wife called as his  bpm transiently on Friday night so he did not go to ER  Will add digoxin 0.125 mg daily  Recommend EP study and ablation of PSVT

## 2019-02-21 ENCOUNTER — HOSPITAL ENCOUNTER (OUTPATIENT)
Dept: LAB | Age: 84
Discharge: HOME | End: 2019-02-21
Payer: MEDICARE

## 2019-02-21 ENCOUNTER — OFFICE VISIT (OUTPATIENT)
Dept: CARDIOLOGY CLINIC | Age: 84
End: 2019-02-21

## 2019-02-21 VITALS
RESPIRATION RATE: 14 BRPM | HEART RATE: 60 BPM | WEIGHT: 182 LBS | SYSTOLIC BLOOD PRESSURE: 136 MMHG | BODY MASS INDEX: 25.48 KG/M2 | OXYGEN SATURATION: 97 % | DIASTOLIC BLOOD PRESSURE: 76 MMHG | HEIGHT: 71 IN

## 2019-02-21 DIAGNOSIS — I49.5 TACHYCARDIA-BRADYCARDIA SYNDROME (HCC): ICD-10-CM

## 2019-02-21 DIAGNOSIS — I48.3 TYPICAL ATRIAL FLUTTER (HCC): Primary | ICD-10-CM

## 2019-02-21 DIAGNOSIS — I47.1 PSVT (PAROXYSMAL SUPRAVENTRICULAR TACHYCARDIA) (HCC): ICD-10-CM

## 2019-02-21 DIAGNOSIS — Z95.0 CARDIAC PACEMAKER IN SITU: ICD-10-CM

## 2019-02-21 DIAGNOSIS — Z01.818 PRE-OP TESTING: ICD-10-CM

## 2019-02-21 PROCEDURE — 80048 BASIC METABOLIC PNL TOTAL CA: CPT

## 2019-02-21 PROCEDURE — 36415 COLL VENOUS BLD VENIPUNCTURE: CPT

## 2019-02-21 PROCEDURE — 85025 COMPLETE CBC W/AUTO DIFF WBC: CPT

## 2019-02-21 NOTE — PROGRESS NOTES
Cardiac Electrophysiology OFFICE Note Subjective: Gabriel Williamson is a 80 y.o. patient who is seen for follow up of AVNRT, atrial flutter, & [employer] Caleb dual chamber pacemaker (DOI 01/04/2019). He called in late January to report HR transiently up to 150s. States improved somewhat with taking a deep breath, holding it, then pressing on his chest.  Digoxin 0.125 mg po daily was initiated. Since then, he denies known recurrence of tachycardia. It has not typically been provoked by activity. Still with occasional palpitations. Pacer check in 01/2018 showed proper lead & generator function. RA paced 51%, RV paced 76%. AVNRT & AFL noted. Longest AMS 19 minutes on 01/04/2019, longest HVR 6 seconds. He does report mild fatigue as well. Denies SOB, PND, orthopnea, chest pain, lightheadedness, syncope, or edema. Mild itching at left chest pacer site. Anticoagulated with Xarelto. Denies bleeding issues. Wife & daughter present today. Previous: 
Seen at [facility] for pacer implant. Followed by Dr. Mahad Jennings. S/p CABG Problem List  Date Reviewed: 2/21/2019 Codes Class Noted Pacemaker ICD-10-CM: Z95.0 ICD-9-CM: V45.01  1/3/2019 Overview Signed 1/3/2019  4:13 PM by Kenneth Agustin MD  
  St Caleb pacer dual chamber 1/3/2019 Arthritis ICD-10-CM: M19.90 ICD-9-CM: 716.90  Unknown Hypercholesterolemia ICD-10-CM: E78.00 ICD-9-CM: 272.0  Unknown Hypertension ICD-10-CM: I10 
ICD-9-CM: 401.9  Unknown SVT (supraventricular tachycardia) (HCC) ICD-10-CM: I47.1 ICD-9-CM: 427.89  Unknown Palpitations ICD-10-CM: R00.2 ICD-9-CM: 785.1  12/31/2018 Esophageal reflux ICD-10-CM: K21.9 ICD-9-CM: 530.81  12/4/2011 Current Outpatient Medications Medication Sig Dispense Refill  digoxin (LANOXIN) 0.125 mg tablet Take 1 Tab by mouth daily. 30 Tab 5  metoprolol succinate (TOPROL-XL) 50 mg XL tablet Take 1 Tab by mouth daily. 30 Tab 5  
 rivaroxaban (XARELTO) 20 mg tab tablet Take 1 Tab by mouth daily (with dinner). 30 Tab 5  cholecalciferol (VITAMIN D3) 1,000 unit tablet Take 1,000 Units by mouth daily.  omeprazole (PRILOSEC) 20 mg capsule Take 20 mg by mouth daily.  rosuvastatin (CRESTOR) 5 mg tablet Take  by mouth nightly.  VIT A,C & E/NIAC/B2/LUT/MN/GLU (EYE-MARYA PO) Take  by mouth. Allergies Allergen Reactions  Lipitor [Atorvastatin] Rash and Itching Past Medical History:  
Diagnosis Date  Arthritis  Hypercholesterolemia  Hypertension  SVT (supraventricular tachycardia) (HCC) Past Surgical History:  
Procedure Laterality Date  HX ORTHOPAEDIC  2/10 L shoulder replacement  IL INS NEW/RPLCMT PRM PM W/TRANSV ELTRD ATRIAL&VENT  1/3/2019 History reviewed. No pertinent family history. Social History Tobacco Use  Smoking status: Never Smoker  Smokeless tobacco: Never Used Substance Use Topics  Alcohol use: No  
  
 
Review of Systems:  
Constitutional: Negative for fever, chills, weight loss, + malaise/fatigue. HEENT: Negative for nosebleeds, vision changes. Respiratory: Negative for cough, hemoptysis. Cardiovascular: Negative for chest pain, + palpitations, no orthopnea, claudication, leg swelling, syncope, and PND. Gastrointestinal: Negative for nausea, vomiting, diarrhea, blood in stool and melena. Genitourinary: Negative for dysuria, and hematuria. Musculoskeletal: Negative for myalgias, arthralgia. Skin: Negative for rash. + itching & scab at left chest pacer site Heme: Does not bleed or bruise easily. Neurological: Negative for speech change and focal weakness Objective:  
 
Visit Vitals /76 (BP 1 Location: Left arm, BP Patient Position: Sitting) Pulse 60 Resp 14 Ht 5' 11\" (1.803 m) Wt 182 lb (82.6 kg) SpO2 97% BMI 25.38 kg/m² Physical Exam:  
Constitutional: well-developed and well-nourished. No respiratory distress. Head: Normocephalic and atraumatic. Eyes: Pupils are equal, round ENT: hearing normal 
Neck: supple. No JVD present. Cardiovascular: Normal rate, regular rhythm. Exam reveals no gallop and no friction rub.  + systolic murmur LSB. Pulmonary/Chest: Effort normal and breath sounds normal. No wheezes. Abdominal: Soft, no tenderness. Mildly obese. Musculoskeletal: No edema. Neurological: Alert,oriented. Skin: Skin is warm and dry. Left chest pacer site with small scab noted at lateral end. No drainage, erythema, or edema at site. Psychiatric: Normal mood and affect. Behavior is normal. Judgment and thought content normal.   
 
 
 
Assessment/Plan: ICD-10-CM ICD-9-CM 1. Typical atrial flutter ([facility]) I48.3 427.32 CBC WITH AUTOMATED DIFF  
   METABOLIC PANEL, BASIC 2. Cardiac pacemaker in situ Z95.0 V45.01 CBC WITH AUTOMATED DIFF  
   METABOLIC PANEL, BASIC 3. PSVT (paroxysmal supraventricular tachycardia) ([facility]) I47.1 427.0 CBC WITH AUTOMATED DIFF  
   METABOLIC PANEL, BASIC 4. Tachycardia-bradycardia syndrome ([facility] [address] I49.5 427.81 CBC WITH AUTOMATED DIFF  
   METABOLIC PANEL, BASIC 5. Pre-op testing Z01.818 V72.84 CBC WITH AUTOMATED DIFF  
   METABOLIC PANEL, BASIC Mr. Cheyenne Gupta has known tachy cate syndrome, is s/p St. Caleb dual chamber pacemaker on 01/04/2019. Since implant, he has had atrial flutter with occasional RVR, AVNRT despite beta blocker. Digoxin added in late January, has noted less tachycardia since then, but continues with some mild fatigue & occasional palpitations. Risks/benefits of EP study & SVT ablation discussed with patient & family members. They would like to proceed with plans for procedure. In the interim, continue metoprolol 25 mg po bid & digoxin 0.125 mg po daily. He will hold metoprolol x 3 days prior to procedure. Continue Xarelto for embolic CVA prophylaxis. Scab at lateral end of pacer incision site appears to be vicryl suture. Will plan to trim at time of procedure if this persists. Future Appointments Date Time Provider Floyd Robert 3/28/2019  9:20 AM Manju Hobbs  [address]  
4/18/2019 11:15 AM Darius Barrett, [address] [facility]  
4/18/2019 11:20 AM Manju Hobbs  [address] Thank you for involving me in this patient's care and please call with further concerns or questions. Mildred Montano M.D. Electrophysiology/Cardiology [address] [facility] [place] [facility] 84, [facility] [address] [place] [address] Linda Jackson, [address] [place] [address] 
[phone]                                        [phone]

## 2019-02-21 NOTE — PROGRESS NOTES
Cardiac Electrophysiology OFFICE Note     Subjective: Kelvin Valencia is a 80 y.o. patient who is seen for follow up of AVNRT, atrial flutter, & St. Caleb dual chamber pacemaker (DOI 01/04/2019). He called in late January to report HR transiently up to 150s. States improved somewhat with taking a deep breath, holding it, then pressing on his chest.  Digoxin 0.125 mg po daily was initiated. Still with occasional palpitations. Pacer check in 01/2018 showed proper lead & generator function. RA paced 51%, RV paced 76%. AVNRT & AFL noted. Longest AMS 19 minutes on 01/04/2019, longest HVR 6 seconds. He does report mild fatigue as well. Denies SOB, PND, orthopnea, chest pain, lightheadedness, syncope, or edema. Mild itching at left chest pacer site. Anticoagulated with Xarelto. Denies bleeding issues. Wife & daughter present today. Previous:  Seen at [facility] for pacer implant. Followed by Dr. Ray Pal. S/p CABG      Problem List  Date Reviewed: 2/21/2019          Codes Class Noted    Pacemaker ICD-10-CM: Z95.0  ICD-9-CM: V45.01  1/3/2019    Overview Signed 1/3/2019  4:13 PM by Moose Garrett MD     St Caleb pacer dual chamber 1/3/2019             Arthritis ICD-10-CM: M19.90  ICD-9-CM: 716.90  Unknown        Hypercholesterolemia ICD-10-CM: E78.00  ICD-9-CM: 272.0  Unknown        Hypertension ICD-10-CM: I10  ICD-9-CM: 401.9  Unknown        SVT (supraventricular tachycardia) ([facility] [address] ICD-10-CM: I47.1  ICD-9-CM: 427.89  Unknown        Palpitations ICD-10-CM: R00.2  ICD-9-CM: 785.1  12/31/2018        Esophageal reflux ICD-10-CM: K21.9  ICD-9-CM: 530.81  12/4/2011              Current Outpatient Medications   Medication Sig Dispense Refill    digoxin (LANOXIN) 0.125 mg tablet Take 1 Tab by mouth daily. 30 Tab 5    metoprolol succinate (TOPROL-XL) 50 mg XL tablet Take 1 Tab by mouth daily.  30 Tab 5    rivaroxaban (XARELTO) 20 mg tab tablet Take 1 Tab by mouth daily (with dinner). 30 Tab 5    cholecalciferol (VITAMIN D3) 1,000 unit tablet Take 1,000 Units by mouth daily.  omeprazole (PRILOSEC) 20 mg capsule Take 20 mg by mouth daily.  rosuvastatin (CRESTOR) 5 mg tablet Take  by mouth nightly.  VIT A,C & E/NIAC/B2/LUT/MN/GLU (EYE-MARYA PO) Take  by mouth. Allergies   Allergen Reactions    Lipitor [Atorvastatin] Rash and Itching     Past Medical History:   Diagnosis Date    Arthritis     Hypercholesterolemia     Hypertension     SVT (supraventricular tachycardia) (HCC)      Past Surgical History:   Procedure Laterality Date    HX ORTHOPAEDIC  2/10    L shoulder replacement    NM INS NEW/RPLCMT PRM PM W/TRANSV ELTRD ATRIAL&VENT  1/3/2019          History reviewed. No pertinent family history. Social History     Tobacco Use    Smoking status: Never Smoker    Smokeless tobacco: Never Used   Substance Use Topics    Alcohol use: No        Review of Systems:   Constitutional: Negative for fever, chills, weight loss, + malaise/fatigue. HEENT: Negative for nosebleeds, vision changes. Respiratory: Negative for cough, hemoptysis. Cardiovascular: Negative for chest pain, + palpitations, no orthopnea, claudication, leg swelling, syncope, and PND. Gastrointestinal: Negative for nausea, vomiting, diarrhea, blood in stool and melena. Genitourinary: Negative for dysuria, and hematuria. Musculoskeletal: Negative for myalgias, arthralgia. Skin: Negative for rash. + itching & scab at left chest pacer site  Heme: Does not bleed or bruise easily. Neurological: Negative for speech change and focal weakness     Objective:     Visit Vitals  /76 (BP 1 Location: Left arm, BP Patient Position: Sitting)   Pulse 60   Resp 14   Ht 5' 11\" (1.803 m)   Wt 182 lb (82.6 kg)   SpO2 97%   BMI 25.38 kg/m²      Physical Exam:   Constitutional: well-developed and well-nourished. No respiratory distress. Head: Normocephalic and atraumatic.    Eyes: Pupils are equal, round  ENT: hearing normal  Neck: supple. No JVD present. Cardiovascular: Normal rate, regular rhythm. Exam reveals no gallop and no friction rub.  + 2/6 systolic murmur LSB. Pulmonary/Chest: Effort normal and breath sounds normal. No wheezes. Abdominal: Soft, no tenderness. Mildly obese. Musculoskeletal: No edema. Neurological: Alert,oriented. Skin: Skin is warm and dry. Left chest pacer site with small scab noted at lateral end. No drainage, erythema, or edema at site. Psychiatric: Normal mood and affect. Behavior is normal. Judgment and thought content normal.          Assessment/Plan:         ICD-10-CM ICD-9-CM    1. Typical atrial flutter ([facility]) I48.3 427.32 CBC WITH AUTOMATED DIFF      METABOLIC PANEL, BASIC   2. Cardiac pacemaker in situ Z95.0 V45.01 CBC WITH AUTOMATED DIFF      METABOLIC PANEL, BASIC   3. PSVT (paroxysmal supraventricular tachycardia) ([facility]) I47.1 427.0 CBC WITH AUTOMATED DIFF      METABOLIC PANEL, BASIC   4. Tachycardia-bradycardia syndrome ([facility] [address] I49.5 427.81 CBC WITH AUTOMATED DIFF      METABOLIC PANEL, BASIC   5. Pre-op testing Z01.818 V72.84 CBC WITH AUTOMATED DIFF      METABOLIC PANEL, BASIC     Mr. Mandy Skinner has known tachy cate syndrome, is s/p St. Caleb dual chamber pacemaker on 01/04/2019. Since implant, he has had atrial flutter with occasional RVR, AVNRT despite beta blocker. Digoxin added in late January,  + mild fatigue & occasional palpitations. Risks/benefits of EP study & SVT ablation discussed with patient & family members. They would like to proceed with plans for procedure. In the interim, continue metoprolol 25 mg po bid & digoxin 0.125 mg po daily. He will hold metoprolol x 3 days prior to procedure. I recommend he continue with Digoxin because he may be at risk for having recurrent SVT or atrial flutter before the procedure can be done. Hold xarelto 2 days before ablation  Continue Xarelto for embolic CVA prophylaxis.     Scab at lateral end of pacer incision site appears to be vicryl suture. Will plan to trim at time of procedure if this persists. Future Appointments   Date Time Provider Floyd Robert   3/28/2019  9:20 AM Janice Montano  [address]   4/18/2019 11:15 AM Joselin [place] [address] [facility]   4/18/2019 11:20 AM Janice Montano  [address]         Thank you for involving me in this patient's care and please call with further concerns or questions. Pamela Ortega M.D.   Electrophysiology/Cardiology  [facility] and [facility] [place]  [address], [place] [address], [address]  [phone]

## 2019-02-21 NOTE — PATIENT INSTRUCTIONS
Your EP study and Ablation procedure has been scheduled for 3/13/19 at 8 am, at [facility].    Please report to Admitting Department by 6:15 am, or 2 hours prior to your scheduled procedure. Please bring a list of your current medications and medication bottles, if able, to the hospital on this day. You will be unable to drive after your procedure so please make sure to bring someone with you to your procedure. You will need to have nothing to eat or drink after midnight, the night prior to your procedure. You may have small sips of water, if needed, to take with your medication. You will need labs drawn prior to your procedure. Please go to [facility] to have this done anytime. You should stop your medication, Toprol x 3 days and Xarelto x 2 days, prior to your scheduled procedure. After your procedure, you will need to follow up with Dr. Ubaldo Schirmer.  Your follow-up appointment has been scheduled for 3/28/19 at 9:20 am.

## 2019-02-21 NOTE — H&P (VIEW-ONLY)
Cardiac Electrophysiology OFFICE Note     Subjective: Vee Nettles is a 80 y.o. patient who is seen for follow up of AVNRT, atrial flutter, & St. Caleb dual chamber pacemaker (DOI 01/04/2019). He called in late January to report HR transiently up to 150s. States improved somewhat with taking a deep breath, holding it, then pressing on his chest.  Digoxin 0.125 mg po daily was initiated. Still with occasional palpitations. Pacer check in 01/2018 showed proper lead & generator function. RA paced 51%, RV paced 76%. AVNRT & AFL noted. Longest AMS 19 minutes on 01/04/2019, longest HVR 6 seconds. He does report mild fatigue as well. Denies SOB, PND, orthopnea, chest pain, lightheadedness, syncope, or edema. Mild itching at left chest pacer site. Anticoagulated with Xarelto. Denies bleeding issues. Wife & daughter present today. Previous:  Seen at [facility] for pacer implant. Followed by Dr. Debbie Jerez. S/p CABG      Problem List  Date Reviewed: 2/21/2019          Codes Class Noted    Pacemaker ICD-10-CM: Z95.0  ICD-9-CM: V45.01  1/3/2019    Overview Signed 1/3/2019  4:13 PM by John Gomez MD     St Caleb pacer dual chamber 1/3/2019             Arthritis ICD-10-CM: M19.90  ICD-9-CM: 716.90  Unknown        Hypercholesterolemia ICD-10-CM: E78.00  ICD-9-CM: 272.0  Unknown        Hypertension ICD-10-CM: I10  ICD-9-CM: 401.9  Unknown        SVT (supraventricular tachycardia) ([facility] [address] ICD-10-CM: I47.1  ICD-9-CM: 427.89  Unknown        Palpitations ICD-10-CM: R00.2  ICD-9-CM: 785.1  12/31/2018        Esophageal reflux ICD-10-CM: K21.9  ICD-9-CM: 530.81  12/4/2011              Current Outpatient Medications   Medication Sig Dispense Refill    digoxin (LANOXIN) 0.125 mg tablet Take 1 Tab by mouth daily. 30 Tab 5    metoprolol succinate (TOPROL-XL) 50 mg XL tablet Take 1 Tab by mouth daily.  30 Tab 5    rivaroxaban (XARELTO) 20 mg tab tablet Take 1 Tab by mouth daily (with dinner). 30 Tab 5    cholecalciferol (VITAMIN D3) 1,000 unit tablet Take 1,000 Units by mouth daily.  omeprazole (PRILOSEC) 20 mg capsule Take 20 mg by mouth daily.  rosuvastatin (CRESTOR) 5 mg tablet Take  by mouth nightly.  VIT A,C & E/NIAC/B2/LUT/MN/GLU (EYE-MARYA PO) Take  by mouth. Allergies   Allergen Reactions    Lipitor [Atorvastatin] Rash and Itching     Past Medical History:   Diagnosis Date    Arthritis     Hypercholesterolemia     Hypertension     SVT (supraventricular tachycardia) (HCC)      Past Surgical History:   Procedure Laterality Date    HX ORTHOPAEDIC  2/10    L shoulder replacement    UT INS NEW/RPLCMT PRM PM W/TRANSV ELTRD ATRIAL&VENT  1/3/2019          History reviewed. No pertinent family history. Social History     Tobacco Use    Smoking status: Never Smoker    Smokeless tobacco: Never Used   Substance Use Topics    Alcohol use: No        Review of Systems:   Constitutional: Negative for fever, chills, weight loss, + malaise/fatigue. HEENT: Negative for nosebleeds, vision changes. Respiratory: Negative for cough, hemoptysis. Cardiovascular: Negative for chest pain, + palpitations, no orthopnea, claudication, leg swelling, syncope, and PND. Gastrointestinal: Negative for nausea, vomiting, diarrhea, blood in stool and melena. Genitourinary: Negative for dysuria, and hematuria. Musculoskeletal: Negative for myalgias, arthralgia. Skin: Negative for rash. + itching & scab at left chest pacer site  Heme: Does not bleed or bruise easily. Neurological: Negative for speech change and focal weakness     Objective:     Visit Vitals  /76 (BP 1 Location: Left arm, BP Patient Position: Sitting)   Pulse 60   Resp 14   Ht 5' 11\" (1.803 m)   Wt 182 lb (82.6 kg)   SpO2 97%   BMI 25.38 kg/m²      Physical Exam:   Constitutional: well-developed and well-nourished. No respiratory distress. Head: Normocephalic and atraumatic.    Eyes: Pupils are equal, round  ENT: hearing normal  Neck: supple. No JVD present. Cardiovascular: Normal rate, regular rhythm. Exam reveals no gallop and no friction rub.  + 2/6 systolic murmur LSB. Pulmonary/Chest: Effort normal and breath sounds normal. No wheezes. Abdominal: Soft, no tenderness. Mildly obese. Musculoskeletal: No edema. Neurological: Alert,oriented. Skin: Skin is warm and dry. Left chest pacer site with small scab noted at lateral end. No drainage, erythema, or edema at site. Psychiatric: Normal mood and affect. Behavior is normal. Judgment and thought content normal.          Assessment/Plan:         ICD-10-CM ICD-9-CM    1. Typical atrial flutter ([facility]) I48.3 427.32 CBC WITH AUTOMATED DIFF      METABOLIC PANEL, BASIC   2. Cardiac pacemaker in situ Z95.0 V45.01 CBC WITH AUTOMATED DIFF      METABOLIC PANEL, BASIC   3. PSVT (paroxysmal supraventricular tachycardia) ([facility]) I47.1 427.0 CBC WITH AUTOMATED DIFF      METABOLIC PANEL, BASIC   4. Tachycardia-bradycardia syndrome ([address] I49.5 427.81 CBC WITH AUTOMATED DIFF      METABOLIC PANEL, BASIC   5. Pre-op testing Z01.818 V72.84 CBC WITH AUTOMATED DIFF      METABOLIC PANEL, BASIC     Mr. Audrey Borrego has known tachy cate syndrome, is s/p St. Caleb dual chamber pacemaker on 01/04/2019. Since implant, he has had atrial flutter with occasional RVR, AVNRT despite beta blocker. Digoxin added in late January,  + mild fatigue & occasional palpitations. Risks/benefits of EP study & SVT ablation discussed with patient & family members. They would like to proceed with plans for procedure. In the interim, continue metoprolol 25 mg po bid & digoxin 0.125 mg po daily. He will hold metoprolol x 3 days prior to procedure. I recommend he continue with Digoxin because he may be at risk for having recurrent SVT or atrial flutter before the procedure can be done. Hold xarelto 2 days before ablation  Continue Xarelto for embolic CVA prophylaxis.     Scab at lateral end of pacer incision site appears to be vicryl suture. Will plan to trim at time of procedure if this persists. Future Appointments   Date Time Provider Floyd Robert   3/28/2019  9:20 AM Lenin Mendez  [address]   4/18/2019 11:15 AM Sheree [place], [address]   4/18/2019 11:20 AM Lenin Mendez  [address]         Thank you for involving me in this patient's care and please call with further concerns or questions. Rachel Jensen M.D.   Electrophysiology/Cardiology  [facility] and Vascular [place]  [facility] [address], [place] [address], [address]  [phone]

## 2019-02-22 LAB
BASOPHILS # BLD AUTO: 0 X10E3/UL (ref 0–0.2)
BASOPHILS NFR BLD AUTO: 0 %
BUN SERPL-MCNC: 18 MG/DL (ref 8–27)
BUN/CREAT SERPL: 16 (ref 10–24)
CALCIUM SERPL-MCNC: 9 MG/DL (ref 8.6–10.2)
CHLORIDE SERPL-SCNC: 103 MMOL/L (ref 96–106)
CO2 SERPL-SCNC: 22 MMOL/L (ref 20–29)
CREAT SERPL-MCNC: 1.1 MG/DL (ref 0.76–1.27)
EOSINOPHIL # BLD AUTO: 0.3 X10E3/UL (ref 0–0.4)
EOSINOPHIL NFR BLD AUTO: 5 %
ERYTHROCYTE [DISTWIDTH] IN BLOOD BY AUTOMATED COUNT: 14.9 % (ref 12.3–15.4)
GLUCOSE SERPL-MCNC: 112 MG/DL (ref 65–99)
HCT VFR BLD AUTO: 46.6 % (ref 37.5–51)
HGB BLD-MCNC: 15.2 G/DL (ref 13–17.7)
IMM GRANULOCYTES # BLD AUTO: 0 X10E3/UL (ref 0–0.1)
IMM GRANULOCYTES NFR BLD AUTO: 0 %
LYMPHOCYTES # BLD AUTO: 1.8 X10E3/UL (ref 0.7–3.1)
LYMPHOCYTES NFR BLD AUTO: 31 %
MCH RBC QN AUTO: 29.8 PG (ref 26.6–33)
MCHC RBC AUTO-ENTMCNC: 32.6 G/DL (ref 31.5–35.7)
MCV RBC AUTO: 91 FL (ref 79–97)
MONOCYTES # BLD AUTO: 0.6 X10E3/UL (ref 0.1–0.9)
MONOCYTES NFR BLD AUTO: 11 %
NEUTROPHILS # BLD AUTO: 3 X10E3/UL (ref 1.4–7)
NEUTROPHILS NFR BLD AUTO: 53 %
PLATELET # BLD AUTO: 169 X10E3/UL (ref 150–379)
POTASSIUM SERPL-SCNC: 5.1 MMOL/L (ref 3.5–5.2)
RBC # BLD AUTO: 5.1 X10E6/UL (ref 4.14–5.8)
SODIUM SERPL-SCNC: 141 MMOL/L (ref 134–144)
WBC # BLD AUTO: 5.8 X10E3/UL (ref 3.4–10.8)

## 2019-03-08 RX ORDER — SODIUM CHLORIDE 0.9 % (FLUSH) 0.9 %
5-40 SYRINGE (ML) INJECTION AS NEEDED
Status: CANCELLED | OUTPATIENT
Start: 2019-03-08

## 2019-03-08 RX ORDER — SODIUM CHLORIDE 0.9 % (FLUSH) 0.9 %
5-40 SYRINGE (ML) INJECTION EVERY 8 HOURS
Status: CANCELLED | OUTPATIENT
Start: 2019-03-08

## 2019-03-12 ENCOUNTER — ANESTHESIA EVENT (OUTPATIENT)
Dept: CARDIAC CATH/INVASIVE PROCEDURES | Age: 84
End: 2019-03-12

## 2019-03-13 ENCOUNTER — HOSPITAL ENCOUNTER (OUTPATIENT)
Age: 84
Setting detail: OUTPATIENT SURGERY
Discharge: HOME | End: 2019-03-13
Attending: INTERNAL MEDICINE | Admitting: INTERNAL MEDICINE
Payer: MEDICARE

## 2019-03-13 ENCOUNTER — ANESTHESIA (OUTPATIENT)
Dept: CARDIAC CATH/INVASIVE PROCEDURES | Age: 84
End: 2019-03-13

## 2019-03-13 VITALS
SYSTOLIC BLOOD PRESSURE: 132 MMHG | HEIGHT: 71 IN | RESPIRATION RATE: 18 BRPM | OXYGEN SATURATION: 96 % | BODY MASS INDEX: 25.75 KG/M2 | TEMPERATURE: 98.1 F | DIASTOLIC BLOOD PRESSURE: 56 MMHG | HEART RATE: 74 BPM

## 2019-03-13 DIAGNOSIS — I48.3 TYPICAL ATRIAL FLUTTER (HCC): ICD-10-CM

## 2019-03-13 PROBLEM — Z98.890 STATUS POST CATHETER ABLATION OF ATRIAL FLUTTER: Status: ACTIVE | Noted: 2019-03-13

## 2019-03-13 PROBLEM — Z86.79 STATUS POST CATHETER ABLATION OF SLOW PATHWAY: Status: ACTIVE | Noted: 2019-03-13

## 2019-03-13 PROBLEM — I47.1 AVNRT (AV NODAL RE-ENTRY TACHYCARDIA) (HCC): Status: ACTIVE | Noted: 2019-03-13

## 2019-03-13 PROBLEM — Z98.890 STATUS POST CATHETER ABLATION OF SLOW PATHWAY: Status: ACTIVE | Noted: 2019-03-13

## 2019-03-13 PROCEDURE — 77030018733 HC ELECTRD KT ENSITE STJU -G: Performed by: INTERNAL MEDICINE

## 2019-03-13 PROCEDURE — 77030039046 HC PAD DEFIB RADIOTRNSPNT CNMD -B: Performed by: INTERNAL MEDICINE

## 2019-03-13 PROCEDURE — 74011250636 HC RX REV CODE- 250/636: Performed by: INTERNAL MEDICINE

## 2019-03-13 PROCEDURE — C1733 CATH, EP, OTHR THAN COOL-TIP: HCPCS | Performed by: INTERNAL MEDICINE

## 2019-03-13 PROCEDURE — 74011000250 HC RX REV CODE- 250: Performed by: INTERNAL MEDICINE

## 2019-03-13 PROCEDURE — 99153 MOD SED SAME PHYS/QHP EA: CPT | Performed by: INTERNAL MEDICINE

## 2019-03-13 PROCEDURE — 74011250636 HC RX REV CODE- 250/636

## 2019-03-13 PROCEDURE — L0628 LSO FLEX NO RI STAYS PRE OTS: HCPCS | Performed by: INTERNAL MEDICINE

## 2019-03-13 PROCEDURE — 93613 INTRACARDIAC EPHYS 3D MAPG: CPT | Performed by: INTERNAL MEDICINE

## 2019-03-13 PROCEDURE — C1894 INTRO/SHEATH, NON-LASER: HCPCS | Performed by: INTERNAL MEDICINE

## 2019-03-13 PROCEDURE — 77030003390 HC NDL ANGI MRTM -A: Performed by: INTERNAL MEDICINE

## 2019-03-13 PROCEDURE — 77030016894 HC CBL EP DX CATH3 STJU -B: Performed by: INTERNAL MEDICINE

## 2019-03-13 PROCEDURE — 93655 ICAR CATH ABLTJ DSCRT ARRHYT: CPT | Performed by: INTERNAL MEDICINE

## 2019-03-13 PROCEDURE — C1730 CATH, EP, 19 OR FEW ELECT: HCPCS | Performed by: INTERNAL MEDICINE

## 2019-03-13 PROCEDURE — 77030010869 HC CBL EP ABL STJU -B: Performed by: INTERNAL MEDICINE

## 2019-03-13 PROCEDURE — 77030010872 HC CBL EP BSC -C: Performed by: INTERNAL MEDICINE

## 2019-03-13 PROCEDURE — 77030010880 HC CBL EP SUPRME STJU -C: Performed by: INTERNAL MEDICINE

## 2019-03-13 PROCEDURE — 99152 MOD SED SAME PHYS/QHP 5/>YRS: CPT | Performed by: INTERNAL MEDICINE

## 2019-03-13 PROCEDURE — 93653 COMPRE EP EVAL TX SVT: CPT | Performed by: INTERNAL MEDICINE

## 2019-03-13 PROCEDURE — 77030016899 HC CBL EP EXT4 BSC -B: Performed by: INTERNAL MEDICINE

## 2019-03-13 RX ORDER — ONDANSETRON 2 MG/ML
4 INJECTION INTRAMUSCULAR; INTRAVENOUS
Status: DISCONTINUED | OUTPATIENT
Start: 2019-03-13 | End: 2019-03-13 | Stop reason: HOSPADM

## 2019-03-13 RX ORDER — SODIUM CHLORIDE 0.9 % (FLUSH) 0.9 %
5-40 SYRINGE (ML) INJECTION EVERY 8 HOURS
Status: DISCONTINUED | OUTPATIENT
Start: 2019-03-13 | End: 2019-03-13 | Stop reason: HOSPADM

## 2019-03-13 RX ORDER — ACETAMINOPHEN 325 MG/1
650 TABLET ORAL
Status: DISCONTINUED | OUTPATIENT
Start: 2019-03-13 | End: 2019-03-13 | Stop reason: HOSPADM

## 2019-03-13 RX ORDER — LATANOPROST 50 UG/ML
1 SOLUTION/ DROPS OPHTHALMIC
COMMUNITY

## 2019-03-13 RX ORDER — LIDOCAINE HYDROCHLORIDE 10 MG/ML
INJECTION INFILTRATION; PERINEURAL AS NEEDED
Status: DISCONTINUED | OUTPATIENT
Start: 2019-03-13 | End: 2019-03-13 | Stop reason: HOSPADM

## 2019-03-13 RX ORDER — SODIUM CHLORIDE 0.9 % (FLUSH) 0.9 %
5-40 SYRINGE (ML) INJECTION AS NEEDED
Status: DISCONTINUED | OUTPATIENT
Start: 2019-03-13 | End: 2019-03-13 | Stop reason: HOSPADM

## 2019-03-13 RX ORDER — BRIMONIDINE TARTRATE 2 MG/ML
1 SOLUTION/ DROPS OPHTHALMIC 2 TIMES DAILY
COMMUNITY

## 2019-03-13 RX ORDER — MIDAZOLAM HYDROCHLORIDE 1 MG/ML
INJECTION, SOLUTION INTRAMUSCULAR; INTRAVENOUS AS NEEDED
Status: DISCONTINUED | OUTPATIENT
Start: 2019-03-13 | End: 2019-03-13 | Stop reason: HOSPADM

## 2019-03-13 RX ORDER — FENTANYL CITRATE 50 UG/ML
INJECTION, SOLUTION INTRAMUSCULAR; INTRAVENOUS AS NEEDED
Status: DISCONTINUED | OUTPATIENT
Start: 2019-03-13 | End: 2019-03-13 | Stop reason: HOSPADM

## 2019-03-13 NOTE — PROGRESS NOTES
Cardiac Cath Lab Procedure Area Arrival Note:    Eran Giordano arrived to Cardiac Cath Lab, Procedure Area. Patient identifiers verified with NAME and DATE OF BIRTH. Procedure verified with patient. Consent forms verified. Allergies verified. Patient informed of procedure and plan of care. Questions answered with review. Patient voiced understanding of procedure and plan of care. Patient on cardiac monitor, non-invasive blood pressure, SPO2 monitor. On  or O2 @ 2 lpm via nasal canula. IV of 0.9% NS on pump at 25 ml/hr. Patient status doing well without problems. Patient is A&Ox 4. Patient reports no pain. Patient medicated during procedure with orders obtained and verified by Dr. Anai Camacho  . Refer to patients Cardiac Cath Lab PROCEDURE REPORT for vital signs, assessment, status, and response during procedure, printed at end of case. Printed report on chart or scanned into chart.

## 2019-03-13 NOTE — PROCEDURES
Cardiac Procedure Note   Patient: Soham De Leon  MRN: [id number]  SSN: [id number]   YOB: 1933 Age: 80 y.o.   Sex: male    Date of Procedure: 3/13/2019   Pre-procedure Diagnosis: PSVT atrial flutter RVR, pacemaker  Post-procedure Diagnosis: same  Procedure: EP Study and Ablation slow pathway and CTI atrial flutter  Reprogrammed pacer  :  Dr. Alexsandra Mcclure MD    Assistant(s):  None  Anesthesia: Moderate Sedation   Estimated Blood Loss: Less than 10 mL   Specimens Removed: None  Findings: PSVT easily induced consistent with AVNRT  Atrial flutter not induced but known before  Ablations of both with 4 mm tip catheter  Complications: None   Implants:  None  Signed by:  Alexsandra Mcclure MD  3/13/2019  11:28 AM

## 2019-03-13 NOTE — PROGRESS NOTES
1259: Head of bed elevated 30degrees right groin site without bleeding and no hematoma. 1348: I have reviewed discharge instructions with the patient and spouse. The patient and spouse verbalized understanding. 1401: Nicolás Garcia ambulated @ 1961 (time) approximately 100 feet. Patient tolerated ambulation without adverse advents. right and left groin (right/left, groin/arm)  without bleeding or hematoma noted.

## 2019-03-13 NOTE — INTERVAL H&P NOTE
H&P Update:  Mathew Stephens was seen and examined. History and physical has been reviewed. The patient has been examined.  There have been no significant clinical changes since the completion of the originally dated History and Physical.    Signed By: Stan Brownlee MD     March 13, 2019 6:36 AM

## 2019-03-13 NOTE — DISCHARGE INSTRUCTIONS
Stop digoxin       Patient Instructions Post-EP study and Ablation    1. No heavy lifting or exercises for 1 week. This includes the following:  Do not push or move furniture, jog or run    2. Do not drive for 2 days. 3.  Call Dr. Sara Connell at [phone] if you experience any of the following symptoms:  Dizziness, lightheadedness, fainting spells  Lack of energy  Shortness of breath  Rapid heart rate  Chest or muscle twitches  Blurry vision, double vision, weakness, numbness  Nausea, vomiting  Fever  Bleeding in the stool, black stool  Leg swelling, pain    4. Follow-up with Dr. Sara Connell as noted below. DISCHARGE INSTRUCTIONS    If possible, have someone stay with you for the first night. It is normal to feel tired for the first couple of days. Take it easy and follow your physicians instructions on activity. CHECK THE CATHETER INSERTION SITE DAILY:    If bleeding at the cath site occurs, take a clean washcloth and apply direct pressure just above the puncture site for at least 15 minutes. Call [phone] immediately if the bleeding is not controlled. Continue to apply direct pressure until an ambulance gets to your location. Do not try to drive yourself or have someone else drive you to the hospital.  Have the ambulance bring you to the emergency room. You may shower 24 hours after your procedure. Gently remove the bandage during showering. Wash with soap and water and pat dry. To prevent infection, keep the groin area/insertion site clean and dry. Do not apply powders, creams, lotions, or ointments to the site for 5 days. You may cover the site with a fresh Band-Aid each day until well healed. You may notice a small lump at the site. This is normal and may last up to 6 weeks. CALL THE PHYSICIAN:  ? If the site becomes red, swollen, or feels warm to the touch, or is healing poorly    ? If you note any large/extending bruise, fever >101.0 or chills  ?  If your extremity has numbness, tingling, discoloration, abnormal swelling, tightness or pain   ? If you have difficulty with urination or develop nausea, vomiting, or severe abdominal pain    ACTIVITY for the first 24-48 hours, or as instructed by your physician:  ? No lifting, pushing or pulling over 10 pounds and no straining the insertion site. Do not lift grocery bags or the garbage can; do not run the vacuum  or  for 7 days. ? You may start walking short distances the day of your procedure. Gradually increase as tolerated each day. Current activity recommendations are 30 minutes of exercise at least 5 days a week. Work up to this as you recover. THINGS TO KEEP IN MIND:   ? Do not drive for 2 days, operate any machinery, or sign any legal documents for 24 hours after your procedure, or as directed by your physician. You must have someone drive you home after your procedure. ? Limit the number of times you go up and down the stairs  ? Take rests and pace yourself with activity  ? Be careful and do not strain with bowel movements    MEDICATIONS:  ? Take all medications as prescribed  ? Call your physician if you have any questions  ? Keep an updated list of your medications with you at all times and give a list to your primary physician and pharmacist  ? You may use Tylenol 325mg 1-2 tablets every 6 hours as needed for pain or discomfort, unless otherwise instructed. If you have significant discomfort more than 48 hours after your procedure, please call your physicians office.     Remember:  IN CASE OF BLEEDING: KEEP FIRM PRESSURE ON THE PROCEDURE SITE AND CALL 911 IF NOT CONTROLLED  Future Appointments   Date Time Provider Floyd Yesica   3/28/2019  9:20 AM Ben Casey  [address] 4/18/2019 11:15 AM Fariha Loyd, [address] [address] [facility]   4/18/2019 11:20 AM Ben Casey  [address]

## 2019-03-13 NOTE — PROGRESS NOTES
1128:  TRANSFER - IN REPORT:    Verbal report received from [address]  on Ernst Doll  from the Cardiac Cath lab, for routine progression of care. Report consisted of patients Situation, Background, Assessment and Recommendations(SBAR). Information from the following report(s) Procedure Summary, Intake/Output, MAR and Recent Results was reviewed with the receiving clinician. Opportunity for questions and clarification was provided. Assessment completed upon patients arrival to [address] [place] [address] and care assumed. Cardiac Cath Lab Recovery Arrival Note:     Ernst Doll arrived to [facility] recovery area. Patient procedure= Ablation. Patient on cardiac monitor, non-invasive blood pressure, Patient status doing well without problems. Patient is A&Ox 4. Patient reports no complaints. Procedure site without any bleeding and no hematoma.

## 2019-03-13 NOTE — ROUTINE PROCESS
Cardiac Cath Lab Recovery Arrival Note:      Uriah Diaz arrived to Cardiac Cath Lab, Recovery Area. Staff introduced to patient. Patient identifiers verified with NAME and DATE OF BIRTH. Procedure verified with patient. Consent forms reviewed and signed by patient or authorized representative and verified. Allergies verified. Patient informed of procedure and plan of care. Questions answered with review. Patient prepped for procedure, per orders from physician, prior to arrival.    Patient on cardiac monitor, non-invasive blood pressure, SPO2 monitor. Patient is A&Ox 4. Patient reports dizziness and lightheadness. Patient in stretcher, in low position, with side rails up, call bell within reach, patient instructed to call of assistance as needed. Patient prep in: [facility] Recovery Area, Bed# 10. Family in: present. Prep by: SHANAE Figueredo

## 2019-03-13 NOTE — Clinical Note
TRANSFER - OUT REPORT:  
 
Verbal report given to: Joana Lo. Report consisted of patient's Situation, Background, Assessment and  
Recommendations(SBAR). Opportunity for questions and clarification was provided. Patient transported with a Registered Nurse. Patient transported to: cath lab recovery.

## 2019-03-28 ENCOUNTER — OFFICE VISIT (OUTPATIENT)
Dept: CARDIOLOGY CLINIC | Age: 84
End: 2019-03-28

## 2019-03-28 VITALS
WEIGHT: 180 LBS | HEIGHT: 71 IN | DIASTOLIC BLOOD PRESSURE: 68 MMHG | HEART RATE: 60 BPM | RESPIRATION RATE: 14 BRPM | BODY MASS INDEX: 25.2 KG/M2 | SYSTOLIC BLOOD PRESSURE: 124 MMHG

## 2019-03-28 DIAGNOSIS — Z98.890 STATUS POST CATHETER ABLATION OF SLOW PATHWAY: ICD-10-CM

## 2019-03-28 DIAGNOSIS — I47.1 AVNRT (AV NODAL RE-ENTRY TACHYCARDIA) (HCC): ICD-10-CM

## 2019-03-28 DIAGNOSIS — Z86.79 STATUS POST CATHETER ABLATION OF SLOW PATHWAY: ICD-10-CM

## 2019-03-28 DIAGNOSIS — I47.1 PSVT (PAROXYSMAL SUPRAVENTRICULAR TACHYCARDIA) (HCC): ICD-10-CM

## 2019-03-28 DIAGNOSIS — Z95.0 CARDIAC PACEMAKER IN SITU: Primary | ICD-10-CM

## 2019-03-28 DIAGNOSIS — I48.3 TYPICAL ATRIAL FLUTTER (HCC): ICD-10-CM

## 2019-03-28 DIAGNOSIS — Z98.890 STATUS POST CATHETER ABLATION OF ATRIAL FLUTTER: ICD-10-CM

## 2019-03-28 NOTE — PROGRESS NOTES
Cardiac Electrophysiology OFFICE Note     Subjective: Gely Marcus is a 80 y.o. patient who is seen for follow up of AVNRT, atrial flutter, & St. Caleb dual chamber pacemaker (DOI 01/04/2019). The patient and his wife are here together after the ablation of AVNRT and atrial flutter. Since then, he has been doing well without palpitations. He feels better and his wife said he is doing too much around the house, cutting down and trees and mowing the lawn and she does not want him to do too much so that he can avoid injury. Pacer check in 01/2018 showed proper lead & generator function. RA paced 51%, RV paced 76%. AVNRT & AFL noted. Anticoagulated with Xarelto. Denies bleeding issues. Previous:  Seen at [facility] for pacer implant. Followed by Dr. Ashley Ulrich.     S/p CABG      Problem List  Date Reviewed: 3/28/2019          Codes Class Noted    Status post catheter ablation of slow pathway ICD-10-CM: Z98.890, Z86.79  ICD-9-CM: V45.89  3/13/2019    Overview Signed 3/13/2019 11:26 AM by Chito Pozo MD     3/13/2019             Status post catheter ablation of atrial flutter ICD-10-CM: Z98.890  ICD-9-CM: V45.89  3/13/2019    Overview Signed 3/13/2019 11:27 AM by Chito Pozo MD     3/13/2019             AVNRT (AV juliano re-entry tachycardia) ([facility] ICD-10-CM: I47.1  ICD-9-CM: 427.89  3/13/2019        Pacemaker ICD-10-CM: Z95.0  ICD-9-CM: V45.01  1/3/2019    Overview Signed 1/3/2019  4:13 PM by Chito Pozo MD     St Caleb pacer dual chamber 1/3/2019             Arthritis ICD-10-CM: M19.90  ICD-9-CM: 716.90  Unknown        Hypercholesterolemia ICD-10-CM: E78.00  ICD-9-CM: 272.0  Unknown        Hypertension ICD-10-CM: I10  ICD-9-CM: 401.9  Unknown        SVT (supraventricular tachycardia) ([address] ICD-10-CM: I47.1  ICD-9-CM: 427.89  Unknown        Palpitations ICD-10-CM: R00.2  ICD-9-CM: 785.1  12/31/2018        Esophageal reflux ICD-10-CM: K21.9  ICD-9-CM: 530.81  12/4/2011 Current Outpatient Medications   Medication Sig Dispense Refill    brimonidine (ALPHAGAN) 0.2 % ophthalmic solution Administer 1 Drop to right eye two (2) times a day.  latanoprost (XALATAN) 0.005 % ophthalmic solution Administer 1 Drop to right eye nightly.  metoprolol succinate (TOPROL-XL) 50 mg XL tablet Take 1 Tab by mouth daily. 30 Tab 5    rivaroxaban (XARELTO) 20 mg tab tablet Take 1 Tab by mouth daily (with dinner). 30 Tab 5    omeprazole (PRILOSEC) 20 mg capsule Take 20 mg by mouth daily.  rosuvastatin (CRESTOR) 5 mg tablet Take 20 mg by mouth daily. Half a tablet daily      VIT A,C & E/NIAC/B2/LUT/MN/GLU (EYE-MARYA PO) Take  by mouth. Allergies   Allergen Reactions    Lipitor [Atorvastatin] Rash and Itching    Oxycodone Rash    Percocet [Oxycodone-Acetaminophen] Rash     Past Medical History:   Diagnosis Date    Arthritis     Hypercholesterolemia     Hypertension     SVT (supraventricular tachycardia) (HCC)      Past Surgical History:   Procedure Laterality Date    HX ORTHOPAEDIC  2/10    L shoulder replacement    MO EPHYS EVAL W/ABLATION SUPRAVENT ARRHYTHMIA N/A 3/13/2019    ABLATION A-FLUTTER performed by Asad June MD at [address] CATH LAB     [address] ADD ON N/A 3/13/2019    Ablation Svt/Vt Add On performed by Asad June MD at [address] CATH LAB    MO INS NEW/RPLCMT PRM PM W/TRANSV ELTRD ATRIAL&VENT  1/3/2019         MO INTRACARDIAC ELECTROPHYSIOLOGIC 3D MAPPING N/A 3/13/2019    Ep 3d Mapping [place] [address] CATH LAB     History reviewed. No pertinent family history. Social History     Tobacco Use    Smoking status: Never Smoker    Smokeless tobacco: Never Used   Substance Use Topics    Alcohol use: No        Review of Systems:   Constitutional: Negative for fever, chills, weight loss   HEENT: Negative for nosebleeds, vision changes.    Respiratory: Negative for cough, hemoptysis. Cardiovascular: Negative for chest pain, no orthopnea, claudication, leg swelling, syncope, and PND. Gastrointestinal: Negative for nausea, vomiting, diarrhea, blood in stool and melena. Genitourinary: Negative for dysuria, and hematuria. Musculoskeletal: Negative for myalgias, arthralgia. Skin: healed left chest pacer site  Heme: Does not bleed or bruise easily. Neurological: Negative for speech change and focal weakness     Objective:     Visit Vitals  /68 (BP 1 Location: Left arm, BP Patient Position: Sitting)   Pulse 60   Resp 14   Ht 5' 10.5\" (1.791 m)   Wt 180 lb (81.6 kg)   BMI 25.46 kg/m²      Physical Exam:   Constitutional: well-developed and well-nourished. No respiratory distress. Head: Normocephalic and atraumatic. Eyes: Pupils are equal, round  ENT: hearing normal  Neck: supple. No JVD present. Cardiovascular: Normal rate, regular rhythm. Exam reveals no gallop and no friction rub   Pulmonary/Chest: Effort normal and breath sounds normal. No wheezes. Abdominal: Soft, no tenderness. Mildly obese. Musculoskeletal: No edema. Neurological: Alert,oriented. Skin: Skin is warm and dry. Left chest pacer site healed. Psychiatric: Normal mood and affect. Behavior is normal. Judgment and thought content normal.      ECG atrial pacing and ventricular sensing    Assessment/Plan:         ICD-10-CM ICD-9-CM    1. Cardiac pacemaker in situ Z95.0 V45.01    2. Typical atrial flutter ([facility]) I48.3 427.32 AMB POC EKG ROUTINE W/ 12 LEADS, INTER & REP   3. PSVT (paroxysmal supraventricular tachycardia) ([facility]) I47.1 427.0    4. AVNRT (AV juliano re-entry tachycardia) ([facility] I47.1 427.89    5. Status post catheter ablation of slow pathway Z98.890 V45.89     Z86.79     6. Status post catheter ablation of atrial flutter Z98.890 V45.89    He has done well after the ablation. The patient has no recurrent arrhythmia right now.   We discussed about the possibility that he may develop paroxysmal atrial fibrillation in the future despite atrial flutter ablation. He will continue to take anticoagulation xarelto and beta-blocker for bp. The patient will follow up with Dr. Tyler Cunningham. Pacemaker will be reinterrogated in May and final programming will be done before annual visit set up. He will return for once a year followup in the office. The patient has remote check of his pacemaker x 3 thereafter. Future Appointments   Date Time Provider Floyd Robert   4/18/2019 11:15 AM Caresse Knee [facility]   4/18/2019 11:20 AM Jan Mistry  [address]         Thank you for involving me in this patient's care and please call with further concerns or questions. Pete Pollard M.D.   Electrophysiology/Cardiology  [facility] [place]  [address], [place] [address], [facility] [address]  [address] [facility], [address] [place] [address]  [phone]

## 2019-04-18 ENCOUNTER — CLINICAL SUPPORT (OUTPATIENT)
Dept: CARDIOLOGY CLINIC | Age: 84
End: 2019-04-18

## 2019-04-18 ENCOUNTER — OFFICE VISIT (OUTPATIENT)
Dept: CARDIOLOGY CLINIC | Age: 84
End: 2019-04-18

## 2019-04-18 VITALS — WEIGHT: 179 LBS | BODY MASS INDEX: 25.06 KG/M2 | HEART RATE: 71 BPM | HEIGHT: 71 IN

## 2019-04-18 DIAGNOSIS — Z86.79 STATUS POST CATHETER ABLATION OF SLOW PATHWAY: ICD-10-CM

## 2019-04-18 DIAGNOSIS — I49.5 TACHYCARDIA-BRADYCARDIA SYNDROME (HCC): ICD-10-CM

## 2019-04-18 DIAGNOSIS — Z98.890 STATUS POST CATHETER ABLATION OF SLOW PATHWAY: ICD-10-CM

## 2019-04-18 DIAGNOSIS — Z95.0 CARDIAC PACEMAKER IN SITU: Primary | ICD-10-CM

## 2019-04-18 DIAGNOSIS — I48.3 TYPICAL ATRIAL FLUTTER (HCC): ICD-10-CM

## 2019-04-18 DIAGNOSIS — I47.1 PSVT (PAROXYSMAL SUPRAVENTRICULAR TACHYCARDIA) (HCC): ICD-10-CM

## 2019-04-18 DIAGNOSIS — Z98.890 STATUS POST CATHETER ABLATION OF ATRIAL FLUTTER: ICD-10-CM

## 2019-04-18 DIAGNOSIS — I47.1 AVNRT (AV NODAL RE-ENTRY TACHYCARDIA) (HCC): ICD-10-CM

## 2019-04-18 NOTE — PROGRESS NOTES
Cardiac Electrophysiology OFFICE Note     Subjective: Anna Hampton is a 80 y.o. patient who is seen for follow up of AVNRT, atrial flutter, & St. Caleb dual chamber pacemaker (DOI 01/04/2019). The patient and his wife are here together after the ablation of AVNRT and atrial flutter. Since then, he has been doing well without palpitations. He is active doing yard works   He has no dizziness         Anticoagulated with Xarelto. Denies bleeding issues. Previous:  Seen at [facility] for pacer implant. Followed by Dr. Jamarcus Metzger. S/p CABG      Problem List  Date Reviewed: 4/18/2019          Codes Class Noted    Status post catheter ablation of slow pathway ICD-10-CM: Z98.890, Z86.79  ICD-9-CM: V45.89  3/13/2019    Overview Signed 3/13/2019 11:26 AM by Rosmery Salter MD     3/13/2019             Status post catheter ablation of atrial flutter ICD-10-CM: Z98.890  ICD-9-CM: V45.89  3/13/2019    Overview Signed 3/13/2019 11:27 AM by Rosmery Salter MD     3/13/2019             AVNRT (AV juliano re-entry tachycardia) ([facility] ICD-10-CM: I47.1  ICD-9-CM: 427.89  3/13/2019        Pacemaker ICD-10-CM: Z95.0  ICD-9-CM: V45.01  1/3/2019    Overview Signed 1/3/2019  4:13 PM by Rosmery Salter MD     St Caleb pacer dual chamber 1/3/2019             Arthritis ICD-10-CM: M19.90  ICD-9-CM: 716.90  Unknown        Hypercholesterolemia ICD-10-CM: E78.00  ICD-9-CM: 272.0  Unknown        Hypertension ICD-10-CM: I10  ICD-9-CM: 401.9  Unknown        SVT (supraventricular tachycardia) ([facility] ICD-10-CM: I47.1  ICD-9-CM: 427.89  Unknown        Palpitations ICD-10-CM: R00.2  ICD-9-CM: 785.1  12/31/2018        Esophageal reflux ICD-10-CM: K21.9  ICD-9-CM: 530.81  12/4/2011              Current Outpatient Medications   Medication Sig Dispense Refill    brimonidine (ALPHAGAN) 0.2 % ophthalmic solution Administer 1 Drop to right eye two (2) times a day.       latanoprost (XALATAN) 0.005 % ophthalmic solution Administer 1 Drop to right eye nightly.  metoprolol succinate (TOPROL-XL) 50 mg XL tablet Take 1 Tab by mouth daily. 30 Tab 5    rivaroxaban (XARELTO) 20 mg tab tablet Take 1 Tab by mouth daily (with dinner). 30 Tab 5    omeprazole (PRILOSEC) 20 mg capsule Take 20 mg by mouth daily.  rosuvastatin (CRESTOR) 5 mg tablet Take 20 mg by mouth daily. Half a tablet daily      VIT A,C & E/NIAC/B2/LUT/MN/GLU (EYE-MARYA PO) Take  by mouth. Allergies   Allergen Reactions    Lipitor [Atorvastatin] Rash and Itching    Oxycodone Rash    Percocet [Oxycodone-Acetaminophen] Rash     Past Medical History:   Diagnosis Date    Arthritis     Hypercholesterolemia     Hypertension     SVT (supraventricular tachycardia) (HCC)      Past Surgical History:   Procedure Laterality Date    HX ORTHOPAEDIC  2/10    L shoulder replacement    UT EPHYS EVAL W/ABLATION SUPRAVENT ARRHYTHMIA N/A 3/13/2019    ABLATION A-FLUTTER performed by Ralph Monte MD at [address] [facility] CATH LAB     [address] ADD ON N/A 3/13/2019    Ablation Svt/Vt Add On performed by Ralph Monte MD at [address] CATH LAB    UT INS NEW/RPLCMT PRM PM W/TRANSV ELTRD ATRIAL&VENT  1/3/2019         UT INTRACARDIAC ELECTROPHYSIOLOGIC 3D MAPPING N/A 3/13/2019    Ep 3d Mapping performed by Ralph Monte MD at [address], [facility] CATH LAB       Social History     Tobacco Use    Smoking status: Never Smoker    Smokeless tobacco: Never Used   Substance Use Topics    Alcohol use: No        Review of Systems:   Constitutional: Negative for fever, chills, weight loss   HEENT: Negative for nosebleeds, vision changes. Respiratory: Negative for cough, hemoptysis. Cardiovascular: Negative for chest pain, no orthopnea, claudication, leg swelling, syncope, and PND. Gastrointestinal: Negative for nausea, vomiting, diarrhea, blood in stool and melena. Genitourinary: Negative for dysuria, and hematuria. Musculoskeletal: Negative for myalgias, arthralgia.    Skin: healed left chest pacer site  Heme: Does not bleed or bruise easily. Neurological: Negative for speech change and focal weakness     Objective:     Visit Vitals  Pulse 71   Ht 5' 10.5\" (1.791 m)   Wt 179 lb (81.2 kg)   BMI 25.32 kg/m²      Physical Exam:   Constitutional: well-developed and well-nourished. No respiratory distress. Head: Normocephalic and atraumatic. Eyes: Pupils are equal, round  ENT: hearing normal  Neck: supple. No JVD present. Cardiovascular: Normal rate, regular rhythm. Exam reveals no gallop and no friction rub   Pulmonary/Chest: Effort normal and breath sounds normal. No wheezes. Abdominal: Soft, no tenderness. Mildly obese. Musculoskeletal: No edema. Neurological: Alert,oriented. Skin: Skin is warm and dry. Left chest pacer site healed. Psychiatric: Normal mood and affect. Behavior is normal. Judgment and thought content normal.      ECG atrial pacing and ventricular sensing    Assessment/Plan:         ICD-10-CM ICD-9-CM    1. Cardiac pacemaker in situ Z95.0 V45.01    2. Typical atrial flutter ([facility]) I48.3 427.32    3. PSVT (paroxysmal supraventricular tachycardia) ([facility]) I47.1 427.0    4. AVNRT (AV juliano re-entry tachycardia) ([facility] I47.1 427.89    5. Status post catheter ablation of slow pathway Z98.890 V45.89     Z86.79     6. 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 Status post catheter ablation of atrial flutter Z98.890 V45.89    He has done well after the ablation. The patient has no recurrent arrhythmia symptoms so I rechecked his pacer. We discussed about the possibility that he may develop paroxysmal atrial fibrillation in the future despite atrial flutter ablation. He will continue to take anticoagulation xarelto and beta-blocker    The patient will follow up with Dr. Teresita Benson.     Pacemaker check today showed: March -April 34 second longest atrial flutter no RVR  53% RV pacing and 61% RA pacing    The patient has remote check of his pacemaker x 3 thereafter. Future Appointments   Date Time Provider Floyd Robert   7/29/2019  3:00 PM REMOTE1, [address]   10/30/2019  3:00 PM REMOTE1, [address]   1/29/2020  2:30 PM REMOTE1, [address]   4/30/2020 10:00 AM PACEMAKER3, TRIXIE MATUTE SCHED   4/30/2020 10:20 AM Dinh Centeno  [address]         Thank you for involving me in this patient's care and please call with further concerns or questions. Wolfgang Merlin, M.D.   Electrophysiology/Cardiology  [facility] [place]  [facility], [place] [address], [facility] [address] [place] [address]  [phone]

## 2019-04-18 NOTE — PROGRESS NOTES
Cardiac Electrophysiology OFFICE Note Subjective: Lizbeth Hays is a 80 y.o. patient who is seen for follow up of AVNRT, atrial flutter, & St. Caleb dual chamber pacemaker (DOI 01/04/2019). The patient and his wife are here together after the ablation of AVNRT and atrial flutter. Since then, he has been doing well without palpitations. He feels better and his wife said he is doing too much around the house, cutting down and trees and mowing the lawn and she does not want him to do too much so that he can avoid injury. Pacer check in 01/2018 showed proper lead & generator function. RA paced 51%, RV paced 76%. AVNRT & AFL noted. Anticoagulated with Xarelto. Denies bleeding issues. Previous: 
Seen at [facility] for pacer implant. Followed by Dr. Catherine Henry. S/p CABG Problem List  Date Reviewed: 3/28/2019 Codes Class Noted Status post catheter ablation of slow pathway ICD-10-CM: Z98.890, Z86.79 
ICD-9-CM: V45.89  3/13/2019 Overview Signed 3/13/2019 11:26 AM by Jasmeet Shaw MD  
  3/13/2019 Status post catheter ablation of atrial flutter ICD-10-CM: Z98.890 ICD-9-CM: V45.89  3/13/2019 Overview Signed 3/13/2019 11:27 AM by Jasmeet Shaw MD  
  3/13/2019 AVNRT (AV juliano re-entry tachycardia) ([facility] ICD-10-CM: I47.1 ICD-9-CM: 427.89  3/13/2019 Pacemaker ICD-10-CM: Z95.0 ICD-9-CM: V45.01  1/3/2019 Overview Signed 1/3/2019  4:13 PM by Jasmeet Shaw MD  
  St Caleb pacer dual chamber 1/3/2019 Arthritis ICD-10-CM: M19.90 ICD-9-CM: 716.90  Unknown Hypercholesterolemia ICD-10-CM: E78.00 ICD-9-CM: 272.0  Unknown Hypertension ICD-10-CM: I10 
ICD-9-CM: 401.9  Unknown SVT (supraventricular tachycardia) (HCC) ICD-10-CM: I47.1 ICD-9-CM: 427.89  Unknown Palpitations ICD-10-CM: R00.2 ICD-9-CM: 785.1  12/31/2018 Esophageal reflux ICD-10-CM: K21.9 ICD-9-CM: 530.81  12/4/2011 Current Outpatient Medications Medication Sig Dispense Refill  brimonidine (ALPHAGAN) 0.2 % ophthalmic solution Administer 1 Drop to right eye two (2) times a day.  latanoprost (XALATAN) 0.005 % ophthalmic solution Administer 1 Drop to right eye nightly.  metoprolol succinate (TOPROL-XL) 50 mg XL tablet Take 1 Tab by mouth daily. 30 Tab 5  
 rivaroxaban (XARELTO) 20 mg tab tablet Take 1 Tab by mouth daily (with dinner). 30 Tab 5  
 omeprazole (PRILOSEC) 20 mg capsule Take 20 mg by mouth daily.  rosuvastatin (CRESTOR) 5 mg tablet Take 20 mg by mouth daily. Half a tablet daily  VIT A,C & E/NIAC/B2/LUT/MN/GLU (EYE-MARYA PO) Take  by mouth. Allergies Allergen Reactions  Lipitor [Atorvastatin] Rash and Itching  Oxycodone Rash  Percocet [Oxycodone-Acetaminophen] Rash Past Medical History:  
Diagnosis Date  Arthritis  Hypercholesterolemia  Hypertension  SVT (supraventricular tachycardia) (HCC) Past Surgical History:  
Procedure Laterality Date  HX ORTHOPAEDIC  2/10 L shoulder replacement  CT EPHYS EVAL W/ABLATION SUPRAVENT ARRHYTHMIA N/A 3/13/2019 ABLATION A-FLUTTER performed by Ralph Monte MD at [address], [facility] [address] CATH LAB  CT ICAR CATHETER ABLATION ARRHYTHMIA ADD ON N/A 3/13/2019 Ablation Svt/Vt Add On performed by Ralph Monte MD at [address] CATH LAB  CT INS NEW/RPLCMT PRM PM W/TRANSV ELTRD ATRIAL&VENT  1/3/2019  CT INTRACARDIAC ELECTROPHYSIOLOGIC 3D MAPPING N/A 3/13/2019 Ep 3d Mapping performed [place] [address] CATH LAB No family history on file. Social History Tobacco Use  Smoking status: Never Smoker  Smokeless tobacco: Never Used Substance Use Topics  Alcohol use: No  
  
 
Review of Systems:  
Constitutional: Negative for fever, chills, weight loss HEENT: Negative for nosebleeds, vision changes. Respiratory: Negative for cough, hemoptysis. Cardiovascular: Negative for chest pain, no orthopnea, claudication, leg swelling, syncope, and PND. Gastrointestinal: Negative for nausea, vomiting, diarrhea, blood in stool and melena. Genitourinary: Negative for dysuria, and hematuria. Musculoskeletal: Negative for myalgias, arthralgia. Skin: healed left chest pacer site Heme: Does not bleed or bruise easily. Neurological: Negative for speech change and focal weakness Objective:  
 
Visit Vitals Pulse 71 Ht 5' 10.5\" (1.791 m) Wt 179 lb (81.2 kg) BMI 25.32 kg/m² Physical Exam:  
Constitutional: well-developed and well-nourished. No respiratory distress. Head: Normocephalic and atraumatic. Eyes: Pupils are equal, round ENT: hearing normal 
Neck: supple. No JVD present. Cardiovascular: Normal rate, regular rhythm. Exam reveals no gallop and no friction rub Pulmonary/Chest: Effort normal and breath sounds normal. No wheezes. Abdominal: Soft, no tenderness. Mildly obese. Musculoskeletal: No edema. Neurological: Alert,oriented. Skin: Skin is warm and dry. Left chest pacer site healed. Psychiatric: Normal mood and affect. Behavior is normal. Judgment and thought content normal.   
 
ECG atrial pacing and ventricular sensing Assessment/Plan: ICD-10-CM ICD-9-CM 1. Cardiac pacemaker in situ Z95.0 V45.01   
2. Typical atrial flutter ([facility]) I48.3 427.32   
3. PSVT (paroxysmal supraventricular tachycardia) ([facility]) I47.1 427.0 4. AVNRT (AV juliano re-entry tachycardia) ([facility]) I47.1 427.89 He has done well after the ablation. The patient has no recurrent arrhythmia right now. We discussed about the possibility that he may develop paroxysmal atrial fibrillation in the future despite atrial flutter ablation. He will continue to take anticoagulation xarelto and beta-blocker for bp. The patient will follow up with Dr. Amanda Oliver.   Pacemaker will be reinterrogated in May and final programming will be done before annual visit set up. He will return for once a year followup in the office. The patient has remote check of his pacemaker x 3 thereafter. Future Appointments Date Time Provider Floyd Yesica 7/29/2019  3:00 PM REMOTE1, [address]  
10/30/2019  3:00 PM REMOTE1, [address]  
1/29/2020  2:30 PM REMOTE1, [address]  
4/30/2020 10:00 AM PACEMAKER3, TRIXIE HARRISONENA SCHED  
4/30/2020 10:20 AM Norberto Kowalski  [address] Thank you for involving me in this patient's care and please call with further concerns or questions. Awa Silveira M.D. Electrophysiology/Cardiology [facility] and [facility] [place] [address], [place] [address] [facility] [address] [facility], [address] [place] [address] 
[phone]                                        [phone]

## 2019-04-23 ENCOUNTER — DOCUMENTATION ONLY (OUTPATIENT)
Dept: CARDIOLOGY CLINIC | Age: 84
End: 2019-04-23

## 2019-07-22 ENCOUNTER — DOCUMENTATION ONLY (OUTPATIENT)
Dept: CARDIOLOGY CLINIC | Age: 84
End: 2019-07-22

## 2019-07-22 NOTE — PROGRESS NOTES
Pt wife called back two patient identifiers confirmed. Notified wife Dr. Anne Fletcher wants Pt to continue with Xarelto if it is to expensive he recommends switching to coumadin. Pt wanted to know if they do go back on coumadin could they be checked at [facility] as [place]. Pt stated they have about a month supply and will call back with there decision on coumadin in that time. Pt verbalized understanding of information discussed w/ no further questions at this time.

## 2019-07-22 NOTE — PROGRESS NOTES
Dr Kinsey Munroe said in his letter that patient cannot afford xarelto and wants to stop it    He is 79 yo with CABG hx  His CHADS 2 vasc score is 3  He has atrial flutter and ablation    I cannot recommend him stop anticoagulant.   Perhaps coumadin can be an option if he agrees to check INR    Will ask my nurse to discuss with patient and I will cc this to Dr Kinsey Munroe

## 2019-07-26 ENCOUNTER — DOCUMENTATION ONLY (OUTPATIENT)
Dept: CARDIOLOGY CLINIC | Age: 84
End: 2019-07-26

## 2019-07-29 ENCOUNTER — OFFICE VISIT (OUTPATIENT)
Dept: CARDIOLOGY CLINIC | Age: 84
End: 2019-07-29

## 2019-07-29 DIAGNOSIS — Z95.0 CARDIAC PACEMAKER IN SITU: Primary | ICD-10-CM

## 2019-10-30 ENCOUNTER — OFFICE VISIT (OUTPATIENT)
Dept: CARDIOLOGY CLINIC | Age: 84
End: 2019-10-30

## 2019-10-30 DIAGNOSIS — Z95.0 CARDIAC PACEMAKER IN SITU: Primary | ICD-10-CM

## 2020-01-29 ENCOUNTER — OFFICE VISIT (OUTPATIENT)
Dept: CARDIOLOGY CLINIC | Age: 85
End: 2020-01-29

## 2020-01-29 DIAGNOSIS — Z95.0 CARDIAC PACEMAKER IN SITU: Primary | ICD-10-CM

## 2020-04-28 ENCOUNTER — OFFICE VISIT (OUTPATIENT)
Dept: CARDIOLOGY CLINIC | Age: 85
End: 2020-04-28

## 2020-04-28 DIAGNOSIS — Z95.0 CARDIAC PACEMAKER IN SITU: Primary | ICD-10-CM

## 2020-04-28 NOTE — PROGRESS NOTES
Cardiac Electrophysiology TELEPHONE VIRTUAL VISIT Note     Pursuant to the emergency declaration under the [facility], [facility] waiver authority and the [employer] Act, this Virtual  Visit was conducted, with patient's consent, to reduce the patient's risk of exposure to COVID-19 and provide continuity of care for an established patient. Services were provided through an audio synchronous discussion virtually to substitute for in-person clinic visit. Subjective: Donna Baca is a 80 y.o. patient who is addressed virtually via synchronous audio call for follow up of AVNRT, atrial flutter, & St. Caleb dual chamber pacemaker (DOI 01/04/2019). Last pacer check on 01/29/2020 showed proper lead & generator function. Generator longevity estimated 10 yrs, 2 mos. RA 36%, RV 31%. Since 07/29/2019, 74 AMS episodes noted, longest 6 min 56 sec on 12/31/2019. EGMs suggest AT with 1:1 conduction. AT/AF burden <1%. Single NSVT 10/28/2019 x 2 sec (6 beats) with V rate 185 bpm.  PMT occurred on alerts. Merlin pacemaker check instead of in-clinic annual visit due to [address] on 4/28/2020. Appropriate atrial and ventricular sensing and capture thresholds. Since 1-29-20 there were 74 AMS episodes noted, longest episode lasted 2 minutes and 4 seconds. EGMs suggest atrial tach ( 1:1 Conduction ) AT/AF burden is < 1 %. No programming changes are needed.  Continue follow up on Merlin. 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 Since then, he reports that he has done well. He denies chest pain, palpitations, SOB, PND, orthopnea, dizziness, syncope, or edema. Active, does yard work. Was Anticoagulated with Xarelto. nose bleeding issues. So Dr Mary Thompson took him off xarelto and takes aspirin       Previous:  S/p ablation of typical AFL & AVNRT 03/13/2019.     Seen at [facility] for pacer implant. Followed by Dr. Raghav Morataya. S/p CABG. Problem List  Date Reviewed: 4/18/2019          Codes Class Noted    Status post catheter ablation of slow pathway ICD-10-CM: Z98.890, Z86.79  ICD-9-CM: V45.89  3/13/2019    Overview Signed 3/13/2019 11:26 AM by Tana Heck MD     3/13/2019             Status post catheter ablation of atrial flutter ICD-10-CM: Z98.890  ICD-9-CM: V45.89  3/13/2019    Overview Signed 3/13/2019 11:27 AM by Tana Heck MD     3/13/2019             AVNRT (AV juliano re-entry tachycardia) ([facility] ICD-10-CM: I47.1  ICD-9-CM: 427.89  3/13/2019        Pacemaker ICD-10-CM: Z95.0  ICD-9-CM: V45.01  1/3/2019    Overview Signed 1/3/2019  4:13 PM by Tana Heck MD     St Caleb pacer dual chamber 1/3/2019             Arthritis ICD-10-CM: M19.90  ICD-9-CM: 716.90  Unknown        Hypercholesterolemia ICD-10-CM: E78.00  ICD-9-CM: 272.0  Unknown        Hypertension ICD-10-CM: I10  ICD-9-CM: 401.9  Unknown        SVT (supraventricular tachycardia) ([facility] ICD-10-CM: I47.1  ICD-9-CM: 427.89  Unknown        Palpitations ICD-10-CM: R00.2  ICD-9-CM: 785.1  12/31/2018        Esophageal reflux ICD-10-CM: K21.9  ICD-9-CM: 530.81  12/4/2011              Current Outpatient Medications   Medication Sig Dispense Refill    brimonidine (ALPHAGAN) 0.2 % ophthalmic solution Administer 1 Drop to right eye two (2) times a day.  latanoprost (XALATAN) 0.005 % ophthalmic solution Administer 1 Drop to right eye nightly.  metoprolol succinate (TOPROL-XL) 50 mg XL tablet Take 1 Tab by mouth daily. 30 Tab 5    omeprazole (PRILOSEC) 20 mg capsule Take 20 mg by mouth daily.  rosuvastatin (CRESTOR) 5 mg tablet Take 20 mg by mouth daily. Half a tablet daily      VIT A,C & E/NIAC/B2/LUT/MN/GLU (EYE-MARYA PO) Take  by mouth.      aspirin  Allergies   Allergen Reactions    Lipitor [Atorvastatin] Rash and Itching    Oxycodone Rash    Percocet [Oxycodone-Acetaminophen] Rash     Past Medical History:   Diagnosis Date    Arthritis     Hypercholesterolemia     Hypertension     SVT (supraventricular tachycardia) (HCC)      Past Surgical History:   Procedure Laterality Date    HX ORTHOPAEDIC  2/10    L shoulder replacement    KS EPHYS EVAL W/ABLATION SUPRAVENT ARRHYTHMIA N/A 3/13/2019    ABLATION A-FLUTTER performed by Gabriel Ballesteros MD at [address], [facility]/s Dr CATH LAB     [address] ADD ON N/A 3/13/2019    Ablation Svt/Vt Add On performed by Gabriel Ballesteros MD at [address], [facility] Dr CATH LAB    KS INS NEW/RPLCMT PRM PM W/TRANSV ELTRD ATRIAL&VENT  1/3/2019         KS INTRACARDIAC ELECTROPHYSIOLOGIC 3D MAPPING N/A 3/13/2019    Ep 3d Mapping performed by Gabriel Ballesteros MD at [address], [facility]/s  CATH LAB       Social History     Tobacco Use    Smoking status: Never Smoker    Smokeless tobacco: Never Used   Substance Use Topics    Alcohol use: No        Review of Systems:   Constitutional: Negative for fever, chills, weight loss   HEENT: Negative for vision changes. Respiratory: Negative for cough, hemoptysis. Cardiovascular: Negative for chest pain, no orthopnea, claudication, leg swelling, syncope, and PND. Gastrointestinal: Negative for nausea, vomiting, diarrhea, blood in stool and melena. Genitourinary: Negative for dysuria, and hematuria. Musculoskeletal: Negative for myalgias, arthralgia. Skin: healed left chest pacer site  Heme: Does not bleed or bruise easily. Neurological: Negative for speech change and focal weakness     Objective:     Due to this being a TeleHealth evaluation, many elements of the physical examination are unable to be assessed. Neuro: A&Ox3, speech clear, answering questions appropriately      Assessment/Plan:       ICD-10-CM ICD-9-CM    1. Cardiac pacemaker in situ Z95.0 V45.01    2. 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 AVNRT (AV juliano re-entry tachycardia) ([address] I47.1 427.89    4.  Status post catheter ablation of slow pathway Z98.890 V45.89     Z86.79     5. Status post catheter ablation of atrial flutter Z98.890 V45.89    6. Typical atrial flutter (HCC) I48.3 427.32    7. PAT (paroxysmal atrial tachycardia) ([facility]) I47.1 427.0    8. NSVT (nonsustained ventricular tachycardia) ([facility]) I47.2 427.1          St. Caleb dual chamber pacer check on 01/29/2020 showed proper lead & generator function. Generator longevity estimated 10 yrs, 2 mos. RA 36%, RV 31%. Since 07/29/2019, 74 AMS episodes noted, longest 6 min 56 sec on 12/31/2019. EGMs suggest AT with 1:1 conduction. AT/AF burden <1%. Single NSVT 10/28/2019 x 2 sec (6 beats) with V rate 185 bpm.  PMT occurred on alerts. Merlin pacemaker check instead of in-clinic annual visit due to [address] on 4/28/2020. Appropriate atrial and ventricular sensing and capture thresholds. Since 1-29-20 there were 74 AMS episodes noted, longest episode lasted 2 minutes and 4 seconds. EGMs suggest atrial tach ( 1:1 Conduction ) AT/AF burden is < 1 %. No programming changes are needed.  Continue follow up on Merlin. net    Doing well post ablation, has had AT/NSVTnoted but no recurrent AFL or AVNRT. Discussed that he may develop PAF in the future despite ablation of atrial flutter. Should NSVT increase in duration or frequency, would consider stress test if Dr. Maggie العلي has not done one in the past few years. Continue Toprol XL for rate control. No orthostatic lightheadedness. OFF Xarelto for embolic CVA prophylaxis due to nose bleeding. Remote pacer checks q 3 months. EP clinic follow up in 1 year. Follow up with Dr. Jeff Pierce as previously scheduled. Future Appointments   Date Time Provider Floyd Robert   [facility]2020 10[facility]   4/29/2020  8:30 AM Raven Cervantes  [address]     We discussed the expected course, resolution and complications of the diagnosis(es) in detail.   Medication risks, benefits, costs, interactions, and alternatives were discussed as indicated. I advised him to contact the office if his condition worsens, changes or fails to improve as anticipated. He expressed understanding with the diagnosis(es) and plan. Patient was made aware and verbalized understanding that an appointment will be scheduled for them for a virtual visit and/or office visit within the above time frame. Patient understanding his/her responsibility to call and change time/date if he/she so chooses. Call duration: 5 minutes. Thank you for involving me in this patient's care and please call with further concerns or questions. Bora Bolden M.D.   Electrophysiology/Cardiology  [facility] [place]  [facility], [facility] [address] [place] [address]  [phone]

## 2020-04-29 ENCOUNTER — VIRTUAL VISIT (OUTPATIENT)
Dept: CARDIOLOGY CLINIC | Age: 85
End: 2020-04-29

## 2020-04-29 DIAGNOSIS — Z86.79 STATUS POST CATHETER ABLATION OF SLOW PATHWAY: ICD-10-CM

## 2020-04-29 DIAGNOSIS — I47.1 PAT (PAROXYSMAL ATRIAL TACHYCARDIA) (HCC): ICD-10-CM

## 2020-04-29 DIAGNOSIS — I49.5 TACHYCARDIA-BRADYCARDIA SYNDROME (HCC): ICD-10-CM

## 2020-04-29 DIAGNOSIS — Z98.890 STATUS POST CATHETER ABLATION OF SLOW PATHWAY: ICD-10-CM

## 2020-04-29 DIAGNOSIS — I48.3 TYPICAL ATRIAL FLUTTER (HCC): ICD-10-CM

## 2020-04-29 DIAGNOSIS — I47.1 AVNRT (AV NODAL RE-ENTRY TACHYCARDIA) (HCC): ICD-10-CM

## 2020-04-29 DIAGNOSIS — I47.29 NSVT (NONSUSTAINED VENTRICULAR TACHYCARDIA): ICD-10-CM

## 2020-04-29 DIAGNOSIS — Z98.890 STATUS POST CATHETER ABLATION OF ATRIAL FLUTTER: ICD-10-CM

## 2020-04-29 DIAGNOSIS — Z95.0 CARDIAC PACEMAKER IN SITU: Primary | ICD-10-CM

## 2020-04-29 RX ORDER — AMLODIPINE BESYLATE 10 MG/1
5 TABLET ORAL DAILY
COMMUNITY
End: 2020-10-28

## 2020-04-29 RX ORDER — ASPIRIN 81 MG/1
TABLET ORAL DAILY
COMMUNITY
End: 2021-03-25 | Stop reason: SDUPTHER

## 2020-09-28 ENCOUNTER — OFFICE VISIT (OUTPATIENT)
Dept: CARDIOLOGY CLINIC | Age: 85
End: 2020-09-28
Payer: MEDICARE

## 2020-09-28 DIAGNOSIS — Z95.0 CARDIAC PACEMAKER IN SITU: Primary | ICD-10-CM

## 2020-09-28 PROCEDURE — 93296 REM INTERROG EVL PM/IDS: CPT | Performed by: INTERNAL MEDICINE

## 2020-09-28 PROCEDURE — 93294 REM INTERROG EVL PM/LDLS PM: CPT | Performed by: INTERNAL MEDICINE

## 2020-10-09 ENCOUNTER — DOCUMENTATION ONLY (OUTPATIENT)
Dept: CARDIOLOGY CLINIC | Age: 85
End: 2020-10-09

## 2020-10-09 NOTE — PROGRESS NOTES
Pacemaker reported 5 hours atrial fibrillation with rapid ventricular rate on September 21  He had been anticoagulated with Xarelto in the past but Dr. Leonard Hunter had taken him off because of the epistaxis    Will need to see him   Current Outpatient Medications on File Prior to Visit   Medication Sig Dispense Refill    amLODIPine (NORVASC) 10 mg tablet Take 5 mg by mouth daily.  aspirin delayed-release 81 mg tablet Take  by mouth daily.  brimonidine (ALPHAGAN) 0.2 % ophthalmic solution Administer 1 Drop to right eye two (2) times a day.  latanoprost (XALATAN) 0.005 % ophthalmic solution Administer 1 Drop to right eye nightly.  metoprolol succinate (TOPROL-XL) 50 mg XL tablet Take 1 Tab by mouth daily. 30 Tab 5    omeprazole (PRILOSEC) 20 mg capsule Take 20 mg by mouth daily.  rosuvastatin (CRESTOR) 5 mg tablet Take 20 mg by mouth daily. Half a tablet daily      VIT A,C & E/NIAC/B2/LUT/MN/GLU (EYE-MARYA PO) Take  by mouth. No current facility-administered medications on file prior to visit.       Future Appointments   Date Time Provider Floyd Robert   1/13/2021 11:30 AM REMOTE1, TRIXIE RODGERS AMB

## 2020-10-09 NOTE — PROGRESS NOTES
Verified patient with two types of identifiers. Notified patient of device findings. Patient states that he was asymptomatic. Scheduled patient for follow up to discuss [address] [place] [address]. Patient verbalized understanding and will call with any other questions.       Future Appointments   Date Time Provider Floyd Robert   10/28/2020  1:40 PM MD LINNEA Casiano   1/13/2021 11:30 AM TRIXIE HWANG

## 2020-10-28 ENCOUNTER — OFFICE VISIT (OUTPATIENT)
Dept: CARDIOLOGY CLINIC | Age: 85
End: 2020-10-28
Payer: MEDICARE

## 2020-10-28 ENCOUNTER — TELEPHONE (OUTPATIENT)
Dept: CARDIOLOGY CLINIC | Age: 85
End: 2020-10-28

## 2020-10-28 VITALS
BODY MASS INDEX: 25.05 KG/M2 | HEART RATE: 64 BPM | SYSTOLIC BLOOD PRESSURE: 162 MMHG | DIASTOLIC BLOOD PRESSURE: 72 MMHG | HEIGHT: 70 IN | WEIGHT: 175 LBS

## 2020-10-28 DIAGNOSIS — I47.1 AVNRT (AV NODAL RE-ENTRY TACHYCARDIA) (HCC): ICD-10-CM

## 2020-10-28 DIAGNOSIS — Z98.890 STATUS POST CATHETER ABLATION OF ATRIAL FLUTTER: ICD-10-CM

## 2020-10-28 DIAGNOSIS — I10 ESSENTIAL HYPERTENSION: ICD-10-CM

## 2020-10-28 DIAGNOSIS — I48.92 ATRIAL FLUTTER, UNSPECIFIED TYPE (HCC): Primary | ICD-10-CM

## 2020-10-28 DIAGNOSIS — Z86.79 STATUS POST CATHETER ABLATION OF SLOW PATHWAY: ICD-10-CM

## 2020-10-28 DIAGNOSIS — Z98.890 STATUS POST CATHETER ABLATION OF SLOW PATHWAY: ICD-10-CM

## 2020-10-28 DIAGNOSIS — I47.1 PAT (PAROXYSMAL ATRIAL TACHYCARDIA) (HCC): ICD-10-CM

## 2020-10-28 DIAGNOSIS — Z95.0 CARDIAC PACEMAKER IN SITU: ICD-10-CM

## 2020-10-28 DIAGNOSIS — I49.5 TACHYCARDIA-BRADYCARDIA SYNDROME (HCC): ICD-10-CM

## 2020-10-28 PROCEDURE — 99214 OFFICE O/P EST MOD 30 MIN: CPT | Performed by: INTERNAL MEDICINE

## 2020-10-28 PROCEDURE — G8419 CALC BMI OUT NRM PARAM NOF/U: HCPCS | Performed by: INTERNAL MEDICINE

## 2020-10-28 PROCEDURE — G8432 DEP SCR NOT DOC, RNG: HCPCS | Performed by: INTERNAL MEDICINE

## 2020-10-28 PROCEDURE — G0463 HOSPITAL OUTPT CLINIC VISIT: HCPCS | Performed by: INTERNAL MEDICINE

## 2020-10-28 PROCEDURE — 1101F PT FALLS ASSESS-DOCD LE1/YR: CPT | Performed by: INTERNAL MEDICINE

## 2020-10-28 PROCEDURE — G8427 DOCREV CUR MEDS BY ELIG CLIN: HCPCS | Performed by: INTERNAL MEDICINE

## 2020-10-28 PROCEDURE — G8536 NO DOC ELDER MAL SCRN: HCPCS | Performed by: INTERNAL MEDICINE

## 2020-10-28 RX ORDER — DORZOLAMIDE HCL 20 MG/ML
SOLUTION/ DROPS OPHTHALMIC
COMMUNITY
Start: 2020-08-04

## 2020-10-28 RX ORDER — LOSARTAN POTASSIUM 25 MG/1
TABLET ORAL
COMMUNITY
Start: 2020-10-24 | End: 2020-10-29 | Stop reason: SDUPTHER

## 2020-10-28 NOTE — TELEPHONE ENCOUNTER
Verified patient with two types of identifiers. Patient's wife called to verify that patient is currently only taking Losartan 25 mg daily. Will notify MD for possible changes in medication    Also notified wife that per dr. Brenton Steinberg office last Echo was in 2018. Echo order placed and send to Dr. Brenton Steinberg office.

## 2020-10-28 NOTE — TELEPHONE ENCOUNTER
I recommended losartan 50 mg every day but she said his BP at home was normal  I also recommended eliquis 5 mg bid if BP normal as she said   I discussed EP study and ablation of atrial flutter and possible atrial fibrillation

## 2020-10-28 NOTE — PROGRESS NOTES
Cardiac Electrophysiology OFFICE VISIT Note        Subjective: Edith Abraham is a 80 y.o. patient who is seen for follow up of AVNRT, atrial flutter, & St. Caleb dual chamber pacemaker (DOI 01/04/2019). 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 He denies chest pain, palpitations, SOB, PND, orthopnea, dizziness, syncope, or edema. Was Anticoagulated with Xarelto. nose bleeding issues. So Dr Jacob Lamar took him off xarelto and takes aspirin     He was on 3 BP meds and said he was dizzy so Dr Bia Hirsch has stopped 2 but she and he do not know which one  They said home BP is 226 mmHg systolically    Previous:  S/p ablation of typical AFL & AVNRT 03/13/2019. Seen at [facility] for pacer implant. Followed by Dr. Kymberly Martini. S/p CABG.       Problem List  Date Reviewed: 10/28/2020          Codes Class Noted    Status post catheter ablation of slow pathway ICD-10-CM: Z98.890, Z86.79  ICD-9-CM: V45.89  3/13/2019    Overview Signed 3/13/2019 11:26 AM by Dionne Bennett MD     3/13/2019             Status post catheter ablation of atrial flutter ICD-10-CM: Z98.890  ICD-9-CM: V45.89  3/13/2019    Overview Signed 3/13/2019 11:27 AM by Dionne Bennett MD     3/13/2019             AVNRT (AV juliano re-entry tachycardia) ([facility] ICD-10-CM: I47.1  ICD-9-CM: 427.89  3/13/2019        Pacemaker ICD-10-CM: Z95.0  ICD-9-CM: V45.01  1/3/2019    Overview Signed 1/3/2019  4:13 PM by Dionne Bennett MD     St Caleb pacer dual chamber 1/3/2019             Arthritis ICD-10-CM: M19.90  ICD-9-CM: 716.90  Unknown        Hypercholesterolemia ICD-10-CM: E78.00  ICD-9-CM: 272.0  Unknown        Hypertension ICD-10-CM: I10  ICD-9-CM: 401.9  Unknown        SVT (supraventricular tachycardia) ([facility] ICD-10-CM: I47.1  ICD-9-CM: 427.89  Unknown Palpitations ICD-10-CM: R00.2  ICD-9-CM: 785.1  12/31/2018        Esophageal reflux ICD-10-CM: K21.9  ICD-9-CM: 530.81  12/4/2011              Current Outpatient Medications on File Prior to Visit   Medication Sig Dispense Refill    losartan (COZAAR) 25 mg tablet       dorzolamide (TRUSOPT) 2 % ophthalmic solution INSTILL 1 DROP  2 TIMES EVERY DAY INTO RIGHT EYE      aspirin delayed-release 81 mg tablet Take  by mouth daily.  brimonidine (ALPHAGAN) 0.2 % ophthalmic solution Administer 1 Drop to right eye two (2) times a day.  latanoprost (XALATAN) 0.005 % ophthalmic solution Administer 1 Drop to right eye nightly.  omeprazole (PRILOSEC) 20 mg capsule Take 20 mg by mouth daily.  rosuvastatin (Crestor) 10 mg tablet Take 10 mg by mouth daily.  VIT A,C & E/NIAC/B2/LUT/MN/GLU (EYE-MARYA PO) Take  by mouth. No current facility-administered medications on file prior to visit. Allergies   Allergen Reactions    Lipitor [Atorvastatin] Rash and Itching    Oxycodone Rash    Percocet [Oxycodone-Acetaminophen] Rash     Past Medical History:   Diagnosis Date    Arthritis     Hypercholesterolemia     Hypertension     SVT (supraventricular tachycardia) (HCC)      Past Surgical History:   Procedure Laterality Date    HX ORTHOPAEDIC  2/10    L shoulder replacement    [place] EPHYS EVAL W/ABLATION SUPRAVENT ARRHYTHMIA N/A 3/13/2019    ABLATION A-FLUTTER performed by Soha Ahumada MD at [place] [address], [facility]/s Dr CATH LAB     [address] ADD ON N/A 3/13/2019    Ablation Svt/Vt Add On performed by Soha Ahumada MD at [place] [address], [facility]/s  CATH LAB    NC INS NEW/RPLCMT PRM PM W/TRANSV ELTRD ATRIAL&VENT  1/3/2019         NC INTRACARDIAC ELECTROPHYSIOLOGIC 3D MAPPING N/A 3/13/2019    Ep 3d Mapping performed by Soha Ahumada MD at [place] [address], [facility]/s Dr CATH LAB       Social History     Tobacco Use    Smoking status: Never Smoker    Smokeless tobacco: Never Used   Substance Use Topics    Alcohol use:  No Review of Systems:   Constitutional: Negative for fever, chills, weight loss   HEENT: Negative for vision changes. Respiratory: Negative for cough, hemoptysis. Cardiovascular: Negative for chest pain, no orthopnea, claudication, leg swelling, syncope, and PND. Gastrointestinal: Negative for nausea, vomiting, diarrhea, blood in stool and melena. Genitourinary: Negative for dysuria, and hematuria. Musculoskeletal: Negative for myalgias, arthralgia. Skin: healed left chest pacer site  Heme: Does not bleed or bruise easily. Neurological: Negative for speech change and focal weakness     Objective:     Visit Vitals  BP (!) 162/72   Pulse 64   Ht 5' 10\" (1.778 m)   Wt 175 lb (79.4 kg)   BMI 25.11 kg/m²     Nursing note and vitals reviewed. Constitutional: well-developed and well-nourished. No distress. Head: Normocephalic and atraumatic. Eyes: Pupils are equal, round   Neck: Neck supple. No JVD present. Cardiovascular: Normal rate, regular rhythm and normal heart sounds. Exam reveals no gallop and no friction rub. 3/6 RUSB systolically murmur heard. Pulmonary/Chest: Effort normal and breath sounds normal. No wheezes. Abdominal: Soft, no rebound. Musculoskeletal: no edema and no tenderness. Neurological: alert, oriented. Skin: Skin is warm and dry, not diaphoretic. Psychiatric: normal mood and affect. Behavior is normal. Judgment and thought content normal.     Assessment/Plan:       ICD-10-CM ICD-9-CM    1. Atrial flutter, unspecified type ([address]  I48.92 427.32    2. Cardiac pacemaker in situ  Z95.0 V45.01    3. Tachycardia-bradycardia syndrome ([address]  I49.5 427.81    4. AVNRT (AV juliano re-entry tachycardia) ([address]  I47.1 427.89    5. Status post catheter ablation of slow pathway  Z98.890 V45.89     Z86.79     6. Status post catheter ablation of atrial flutter  Z98.890 V45.89    7. PAT (paroxysmal atrial tachycardia) ([facility])  I47.1 427.0    8.  Essential hypertension  I10 401.9      St. Caleb dual chamber pacer functions properly  He is asymptomatic with atrial flutter episodes  Most are minutes but one in September over 5 hrs  I am concerned about his risk of stroke but he had nose bleeding with xarelto and needed cauterization   He is willing to try eliquis bid but his BP is high so I am concerned about ICH  He is recommended to increase losartan but he is not willing because of dizziness in the past  His wife said he will go home and call back first with BP med names  We discussed ablation of atrial flutter but this could be left atrial and he may also have AFIB. He said he is not symptomatic so we can wait  She and he said they will think over and call back  I also discussed aortic stenosis murmur  Will get new echo report from Dr Gabriela Farmer  Echo 11/5/2020 LVEF 40% mild AS  Start eliquis since BP is controlled  He has not decided about ablation       Thank you for involving me in this patient's care and please call with further concerns or questions. Hazel Bueno M.D.   Electrophysiology/Cardiology  [facility] and [facility] [place]  [address] , [facility]  [facility], [address]                             [address] [place] [address]  [phone]

## 2020-10-28 NOTE — PATIENT INSTRUCTIONS
Please call with updated last of blood pressure medicines and decision about ablation, blood thinner

## 2020-10-29 RX ORDER — LOSARTAN POTASSIUM 50 MG/1
50 TABLET ORAL DAILY
Qty: 90 TAB | Refills: 1 | Status: SHIPPED | OUTPATIENT
Start: 2020-10-29 | End: 2021-03-25

## 2020-10-29 NOTE — TELEPHONE ENCOUNTER
Verified patient with two types of identifiers. Spoke with patient's wife, verified on HIPAA. Notified Wife to increase Losartan to 50 mg daily. Wife states they recently had 25 mg refilled. Patient to take 2 25 mg tabs. Wife to keep a log of patient's BP to make sure within normal range prior to starting Eliquis. Wife will discuss with patient regarding ablation. Patient verbalized understanding and will call with any other questions.

## 2020-11-04 NOTE — TELEPHONE ENCOUNTER
Verified patient with two types of identifiers. Spoke with patient's wife regarding BP since increasing Losartan: 132/66, 63; 128/76, 75; 111/52, 65. Notified wife that those are much better. Notified wife that MD was waiting on improvement in BP prior to starting Eliquis. Ms. William Shone states that they have an appointment with Dr. Qi Jones today. Wife is unsure if it is for just an echocardiogram or an appointment with Dr. Qi Jones. Wife was planning on discussed [address] with MD at appointment today. Notified wife to call our office back after appointment if he did not see MD to start [address]. Patient verbalized understanding and will call with any other questions.

## 2020-11-06 ENCOUNTER — TELEPHONE (OUTPATIENT)
Dept: CARDIOLOGY CLINIC | Age: 85
End: 2020-11-06

## 2020-11-06 NOTE — TELEPHONE ENCOUNTER
Verified patient with two types of identifiers. Spoke with patient's wife, verified on HIPAA who states they cannot afford Eliquis and would like to go back on Xarelto. Notified patient of NP recommendations. Patient requested just a small amount be sent to pharmacy incase patient has another nose bleed. Reminded wife to monitor patient's blood pressure. Patient's wife verbalized understanding and will call with any other questions.

## 2020-11-06 NOTE — TELEPHONE ENCOUNTER
Verified patient with two types of identifiers. Spoke with patient's wife, verified on HIPAA. Notified of echo results and to start Eliquis 5 mg BID. Verified pharmacy. Notified wife to again discuss possible ablation with patient and call back if patient would like to move forward. Patient verbalized understanding and will call with any other questions.       Future Appointments   Date Time Provider Floyd Robert   1/13/2021 11:30 AM REMOTE1, TRIXIE RODGERS AMB

## 2020-11-06 NOTE — TELEPHONE ENCOUNTER
If he wants to try Xarelto again at previous dose, he can switch. He should call if he develops significant epistaxis or other significant bleeding again. In the interim, recommend nasal saline spray or saline gel to keep mucous membranes of nose moist, less prone to bleeding. A humidifier at home would also help to keep mucous membranes in good shape. During last visit, Dr. Jeb Vargas also stressed the importance of BP control while on anticoagulant. Patient should monitor BP, call if consistently >140/90.

## 2020-11-06 NOTE — TELEPHONE ENCOUNTER
Pt's spouse requesting to speak with Kip Song in regards to putting the pt on eliquis. The eliquis cost $1500 and the pt isn't happy with that; pt wants to go back on Xarelto.  Please advise      RUDDY:[phone]

## 2020-11-19 ENCOUNTER — DOCUMENTATION ONLY (OUTPATIENT)
Dept: CARDIOLOGY CLINIC | Age: 85
End: 2020-11-19

## 2020-11-19 NOTE — PROGRESS NOTES
Received correspondence dated 11/05/2020 regarding echo 11/04/2020, sent by Dr. Jason Hutchinson. Echo showed mildly reduced LVEF, mild LVH, & enlargement of a chamber (not specified). Mild AS, mild regurgitation in valves (not specified).

## 2020-12-09 RX ORDER — RIVAROXABAN 20 MG/1
TABLET, FILM COATED ORAL
Qty: 30 TAB | Refills: 0 | Status: SHIPPED | OUTPATIENT
Start: 2020-12-09

## 2021-01-12 ENCOUNTER — DOCUMENTATION ONLY (OUTPATIENT)
Dept: CARDIOLOGY CLINIC | Age: 86
End: 2021-01-12

## 2021-01-12 NOTE — PROGRESS NOTES
Received office note dated 01/11/2021 from Dr. Lisa Neil. C/o irregular HR. Asymptomatic, noted via BP cuff. Off Xarelto due to epistaxis. SVT with aberrancy vs AFL. Echo (11/04/2020): LVEF 40%, mild LVH. LA mildly enlarged. Mild MR. Mild AR, mild AS (GENEVIEVE 1 cm2, peak grad 42 mmHg, mean grad 18 mmHg). Mild TR.

## 2021-01-13 ENCOUNTER — OFFICE VISIT (OUTPATIENT)
Dept: CARDIOLOGY CLINIC | Age: 86
End: 2021-01-13
Payer: MEDICARE

## 2021-01-13 DIAGNOSIS — Z95.0 CARDIAC PACEMAKER IN SITU: Primary | ICD-10-CM

## 2021-01-13 PROCEDURE — 93296 REM INTERROG EVL PM/IDS: CPT | Performed by: INTERNAL MEDICINE

## 2021-01-13 PROCEDURE — 93294 REM INTERROG EVL PM/LDLS PM: CPT | Performed by: INTERNAL MEDICINE

## 2021-01-13 NOTE — LETTER
1/13/2021 9:31 AM 
 
Mr. Joseph Melgar [address] [phone] After careful review of your remote check of your implated device on 3-97-05 I have concluded that your device is working properly. Your next: 
 * Automatic remote check ( from home) is scheduled for  5-3-21 *Only [employer] pacemakers  must  sent a manual transmission. If you have any questions, please call [address] at [phone]. Additional Comments: Please call to schedule annual in clinic appt. Haven't been seen since 1-7-0189________________________________________________________________ 
 
__________________________________________________________________ Sincerely, Temitope Carvajal MD [facility]

## 2021-01-19 ENCOUNTER — DOCUMENTATION ONLY (OUTPATIENT)
Dept: CARDIOLOGY CLINIC | Age: 86
End: 2021-01-19

## 2021-01-19 NOTE — PROGRESS NOTES
Atrial fib burden 27%   On xarelto  Future Appointments   Date Time Provider Floyd Robert   5/3/2021 11:00 AM REMOTE1, TRIXIE YARBROUGH BS AMB       Current Outpatient Medications on File Prior to Visit   Medication Sig Dispense Refill    Xarelto 20 mg tab tablet Take 1 tablet by mouth once daily 30 Tab 0    losartan (COZAAR) 50 mg tablet Take 1 Tab by mouth daily. 90 Tab 1    dorzolamide (TRUSOPT) 2 % ophthalmic solution INSTILL 1 DROP  2 TIMES EVERY DAY INTO RIGHT EYE      aspirin delayed-release 81 mg tablet Take  by mouth daily.  brimonidine (ALPHAGAN) 0.2 % ophthalmic solution Administer 1 Drop to right eye two (2) times a day.  latanoprost (XALATAN) 0.005 % ophthalmic solution Administer 1 Drop to right eye nightly.  omeprazole (PRILOSEC) 20 mg capsule Take 20 mg by mouth daily.  rosuvastatin (Crestor) 10 mg tablet Take 10 mg by mouth daily.  VIT A,C & E/NIAC/B2/LUT/MN/GLU (EYE-MARYA PO) Take  by mouth. No current facility-administered medications on file prior to visit.

## 2021-02-26 ENCOUNTER — TELEPHONE (OUTPATIENT)
Dept: CARDIOLOGY CLINIC | Age: 86
End: 2021-02-26

## 2021-02-26 NOTE — TELEPHONE ENCOUNTER
Verified patient with two types of identifiers. Spoke with patient's wife, verified on HIPAA. Wife verified that patient is currently taking Xarelto 20 mg daily and has been since November. Patient's wife verbalized understanding and will call with any other questions.

## 2021-02-26 NOTE — TELEPHONE ENCOUNTER
----- Message from Keren Lentz sent at 2/26/2021 11:18 AM EST -----  Rochelle Fitting,  In Atrial flutter during transmission; started yesterday 2-25-21 @ 3:30 pm. Atrial  burden gradually increasing since Mid January. Today at 96%. Afib event on 2-21-21 lasted 2 days in duration. Last note in [address] [place] [address] states \" Off Xarelto due to epistaxis\". ..

## 2021-03-01 ENCOUNTER — DOCUMENTATION ONLY (OUTPATIENT)
Dept: CARDIOLOGY CLINIC | Age: 86
End: 2021-03-01

## 2021-03-01 NOTE — PROGRESS NOTES
Per pacer alert Atrial flutter during transmission; started yesterday 2-25-21 @ 3:30 pm. Atrial  burden gradually increasing since Mid January. burden at 96%. Afib event on 2-21-21 lasted 2 days in duration. Last note in [facility] states \" Off Xarelto due to epistaxis\"???  Will make new appt to discuss issues    Current Outpatient Medications on File Prior to Visit   Medication Sig Dispense Refill    Xarelto 20 mg tab tablet Take 1 tablet by mouth once daily 30 Tab 0    losartan (COZAAR) 50 mg tablet Take 1 Tab by mouth daily. 90 Tab 1    dorzolamide (TRUSOPT) 2 % ophthalmic solution INSTILL 1 DROP  2 TIMES EVERY DAY INTO RIGHT EYE      aspirin delayed-release 81 mg tablet Take  by mouth daily.  brimonidine (ALPHAGAN) 0.2 % ophthalmic solution Administer 1 Drop to right eye two (2) times a day.  latanoprost (XALATAN) 0.005 % ophthalmic solution Administer 1 Drop to right eye nightly.  omeprazole (PRILOSEC) 20 mg capsule Take 20 mg by mouth daily.  rosuvastatin (Crestor) 10 mg tablet Take 10 mg by mouth daily.  VIT A,C & E/NIAC/B2/LUT/MN/GLU (EYE-MARYA PO) Take  by mouth. No current facility-administered medications on file prior to visit.           Future Appointments   Date Time Provider Floyd Robert   5/3/2021 11:00 AM REMOTE1, TRIXIE RODGERS AMB

## 2021-03-02 NOTE — PROGRESS NOTES
Verified patient with two types of identifiers. Spoke with patient's wife, verified on HIPAA. Discussed remote check and increased A fib burden. Verified with wife last Friday that patient is currently taking Xarelto 20 mg daily. Verified again that patient is taking Xarelto daily. Scheduled follow up to discuss A fib ablation that had previously been brought up at last office visit. Patient's verbalized understanding and will call with any other questions.       Future Appointments   Date Time Provider Floyd Robert   3/25/2021  1:10 PM MD LINNEA Burt   5/3/2021 11:00 AM REMOTE1, TRIXIE RODGERS AMB

## 2021-03-19 ENCOUNTER — TELEPHONE (OUTPATIENT)
Dept: CARDIOLOGY CLINIC | Age: 86
End: 2021-03-19

## 2021-03-19 RX ORDER — METOPROLOL SUCCINATE 25 MG/1
25 TABLET, EXTENDED RELEASE ORAL DAILY
Qty: 90 TAB | Refills: 1 | Status: SHIPPED | OUTPATIENT
Start: 2021-03-19 | End: 2021-03-25

## 2021-03-19 NOTE — TELEPHONE ENCOUNTER
Called pt two patient identifiers confirmed. Notified pt about NP's recommendations. Pt wife stated pt bp has been running 110'-123 at home and would like to make sure that is okay with metoprolol.  Will ask MD/NP

## 2021-03-19 NOTE — TELEPHONE ENCOUNTER
Called pt wife two patient identifiers confirmed. Notified pt wife  about NP recommendations to cut medications in half if pt becomes lightheaded, SOB, dizziness. Wife verbalized understanding of information discussed w/ no further questions at this time.

## 2021-03-19 NOTE — TELEPHONE ENCOUNTER
Received alert for AF/AFL episode x 76 hrs 03/15/2021. AF burden gradually increasing since mid January, today 97%. Occasional RVR. Patient's family member confirmed via telephone call on 03/01/2021 that he is currently taking Xarelto 20 mg po daily. Previous notes say pt is asymptomatic; per Dr. Berkley Salazar last note, ok to wait on ablation of AFL, could be left-sided, could also have AF. BP elevated on last check, should allow for addition of Toprol XL 25 mg po daily, recommend starting. Continue to watch AF/AFL burden. Patient should call if he becomes symptomatic. Already scheduled for follow up as noted below, can discuss further at that time.     Future Appointments   Date Time Provider Floyd Robert   3/25/2021  1:10 PM MD LINNEA Turner   5/3/2021 11:00 AM REMOTE1, TRIXIE RODGERS AMB

## 2021-03-19 NOTE — TELEPHONE ENCOUNTER
Ok to start Toprol, but monitor BP. For hypotension or orthostatic lightheadedness, would reduce dose to 12.5 mg po daily.

## 2021-03-25 ENCOUNTER — OFFICE VISIT (OUTPATIENT)
Dept: CARDIOLOGY CLINIC | Age: 86
End: 2021-03-25
Payer: MEDICARE

## 2021-03-25 VITALS
WEIGHT: 178 LBS | BODY MASS INDEX: 25.48 KG/M2 | HEART RATE: 70 BPM | DIASTOLIC BLOOD PRESSURE: 70 MMHG | SYSTOLIC BLOOD PRESSURE: 122 MMHG | HEIGHT: 70 IN

## 2021-03-25 DIAGNOSIS — I47.1 PAT (PAROXYSMAL ATRIAL TACHYCARDIA) (HCC): ICD-10-CM

## 2021-03-25 DIAGNOSIS — I47.1 AVNRT (AV NODAL RE-ENTRY TACHYCARDIA) (HCC): ICD-10-CM

## 2021-03-25 DIAGNOSIS — Z98.890 STATUS POST CATHETER ABLATION OF ATRIAL FLUTTER: ICD-10-CM

## 2021-03-25 DIAGNOSIS — Z86.79 STATUS POST CATHETER ABLATION OF SLOW PATHWAY: ICD-10-CM

## 2021-03-25 DIAGNOSIS — I48.91 ATRIAL FIBRILLATION WITH RAPID VENTRICULAR RESPONSE (HCC): Primary | ICD-10-CM

## 2021-03-25 DIAGNOSIS — Z98.890 STATUS POST CATHETER ABLATION OF SLOW PATHWAY: ICD-10-CM

## 2021-03-25 DIAGNOSIS — I49.5 TACHYCARDIA-BRADYCARDIA SYNDROME (HCC): ICD-10-CM

## 2021-03-25 DIAGNOSIS — Z95.0 CARDIAC PACEMAKER IN SITU: ICD-10-CM

## 2021-03-25 DIAGNOSIS — I10 ESSENTIAL HYPERTENSION: ICD-10-CM

## 2021-03-25 PROCEDURE — G0463 HOSPITAL OUTPT CLINIC VISIT: HCPCS | Performed by: INTERNAL MEDICINE

## 2021-03-25 PROCEDURE — 1101F PT FALLS ASSESS-DOCD LE1/YR: CPT | Performed by: INTERNAL MEDICINE

## 2021-03-25 PROCEDURE — G8419 CALC BMI OUT NRM PARAM NOF/U: HCPCS | Performed by: INTERNAL MEDICINE

## 2021-03-25 PROCEDURE — 99214 OFFICE O/P EST MOD 30 MIN: CPT | Performed by: INTERNAL MEDICINE

## 2021-03-25 PROCEDURE — G8432 DEP SCR NOT DOC, RNG: HCPCS | Performed by: INTERNAL MEDICINE

## 2021-03-25 PROCEDURE — G8536 NO DOC ELDER MAL SCRN: HCPCS | Performed by: INTERNAL MEDICINE

## 2021-03-25 PROCEDURE — G8427 DOCREV CUR MEDS BY ELIG CLIN: HCPCS | Performed by: INTERNAL MEDICINE

## 2021-03-25 RX ORDER — METOPROLOL SUCCINATE 50 MG/1
50 TABLET, EXTENDED RELEASE ORAL DAILY
Qty: 90 TAB | Refills: 1
Start: 2021-03-25 | End: 2021-03-29 | Stop reason: SDUPTHER

## 2021-03-25 RX ORDER — LOSARTAN POTASSIUM 25 MG/1
25 TABLET ORAL DAILY
Qty: 90 TAB | Refills: 1
Start: 2021-03-25

## 2021-03-25 NOTE — PROGRESS NOTES
Cardiac Electrophysiology OFFICE VISIT Note        Subjective: Christina Sanchez is a 80 y.o. patient who is seen for follow up  His wife with him  He is asymptomatic and is able to work in his yard all day  She is concerned he is doing too much work  Per pacer alert Atrial flutter during transmission; started yesterday 2-25-21 @ 3:30 pm. Atrial  burden gradually increasing since Mid January. burden at 96%. Afib event on 2-21-21 lasted 2 days in duration. No more nose bleeding with xarelto  He is on it    He had ablation for AVNRT, typical atrial flutter, & [employer] Caleb dual chamber pacemaker (DOI 01/04/2019). in 03/2019. He denies chest pain, palpitations, SOB, PND, orthopnea, dizziness, syncope, or edema. 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 [facility] for pacer implant. Followed by Dr. Aurelia Gonzalez. He does not see more than 6 months    S/p CABG.       Problem List  Date Reviewed: 10/28/2020          Codes Class Noted    Status post catheter ablation of slow pathway ICD-10-CM: Z98.890, Z86.79  ICD-9-CM: V45.89  3/13/2019    Overview Signed 3/13/2019 11:26 AM by Sean Duran MD     3/13/2019             Status post catheter ablation of atrial flutter ICD-10-CM: Z98.890  ICD-9-CM: V45.89  3/13/2019    Overview Signed 3/13/2019 11:27 AM by Sean Duran MD     3/13/2019             AVNRT (AV juliano re-entry tachycardia) ([facility] [address] ICD-10-CM: I47.1  ICD-9-CM: 427.89  3/13/2019        Pacemaker ICD-10-CM: Z95.0  ICD-9-CM: V45.01  1/3/2019    Overview Signed 1/3/2019  4:13 PM by Sean Duran MD     [employer] Caleb pacer dual chamber 1/3/2019             Arthritis ICD-10-CM: M19.90  ICD-9-CM: 716.90  Unknown        Hypercholesterolemia ICD-10-CM: E78.00  ICD-9-CM: 272.0  Unknown        Hypertension ICD-10-CM: I10  ICD-9-CM: 401.9  Unknown        SVT (supraventricular tachycardia) ([facility] [address] ICD-10-CM: I47.1  ICD-9-CM: 427.89  Unknown        Palpitations ICD-10-CM: R00.2  ICD-9-CM: 785.1  12/31/2018        Esophageal reflux ICD-10-CM: K21.9  ICD-9-CM: 530.81  12/4/2011              Current Outpatient Medications on File Prior to Visit   Medication Sig Dispense Refill    metoprolol succinate (TOPROL-XL) 25 mg XL tablet Take 1 Tab by mouth daily. 90 Tab 1    Xarelto 20 mg tab tablet Take 1 tablet by mouth once daily 30 Tab 0    losartan (COZAAR) 50 mg tablet Take 1 Tab by mouth daily. 90 Tab 1    dorzolamide (TRUSOPT) 2 % ophthalmic solution INSTILL 1 DROP  2 TIMES EVERY DAY INTO RIGHT EYE      brimonidine (ALPHAGAN) 0.2 % ophthalmic solution Administer 1 Drop to right eye two (2) times a day.  latanoprost (XALATAN) 0.005 % ophthalmic solution Administer 1 Drop to right eye nightly.  omeprazole (PRILOSEC) 20 mg capsule Take 20 mg by mouth daily.  rosuvastatin (Crestor) 10 mg tablet Take 10 mg by mouth daily.  VIT A,C & E/NIAC/B2/LUT/MN/GLU (EYE-MARYA PO) Take  by mouth.  aspirin delayed-release 81 mg tablet Take  by mouth daily. No current facility-administered medications on file prior to visit.         Allergies   Allergen Reactions    Lipitor [Atorvastatin] Rash and Itching    Oxycodone Rash    Percocet [Oxycodone-Acetaminophen] Rash     Past Medical History:   Diagnosis Date    Arthritis     Hypercholesterolemia     Hypertension     SVT (supraventricular tachycardia) (HCC)      Past Surgical History:   Procedure Laterality Date    HX ORTHOPAEDIC  2/10    L shoulder replacement    NH EPHYS EVAL W/ABLATION SUPRAVENT ARRHYTHMIA N/A 3/13/2019    ABLATION A-FLUTTER performed by Tana Heck MD at [address] CATH LAB     [address] ADD ON N/A 3/13/2019    Ablation Svt/Vt Add On performed by Tana Heck MD at [address] CATH LAB    NH INS NEW/RPLCMT PRM PM W/TRANSV ELTRD ATRIAL&VENT  1/3/2019         NH INTRACARDIAC ELECTROPHYSIOLOGIC 3D MAPPING N/A 3/13/2019    Ep 3d Mapping performed by Meryl Rodriguez, Yeny Rogers MD at [address] [place]       Social History     Tobacco Use    Smoking status: Never Smoker    Smokeless tobacco: Never Used   Substance Use Topics    Alcohol use: No        Review of Systems: all other systems negative  Constitutional: Negative for fever, chills, weight loss   HEENT: Negative for vision changes. Respiratory: Negative for cough, hemoptysis. Cardiovascular: Negative for chest pain, no orthopnea, claudication, leg swelling, syncope, and PND. Gastrointestinal: Negative for nausea, vomiting, diarrhea, blood in stool and melena. Genitourinary: Negative for dysuria, and hematuria. Musculoskeletal: Negative for myalgias, arthralgia. Skin: healed left chest pacer site  Heme: Does not bleed or bruise easily. Neurological: Negative for speech change and focal weakness     Objective:     Visit Vitals  /70   Pulse 70   Ht 5' 10\" (1.778 m)   Wt 178 lb (80.7 kg)   BMI 25.54 kg/m²     Nursing note and vitals reviewed. Constitutional: well-developed and well-nourished. No distress. Head: Normocephalic and atraumatic. Eyes: Pupils are equal, round   Neck: Neck supple. No JVD present. Cardiovascular: Normal rate, regular rhythm and normal heart sounds. Exam reveals no gallop and no friction rub. 2/6 RUSB systolically murmur heard. Pulmonary/Chest: Effort normal and breath sounds normal. No wheezes. Abdominal: Soft, no rebound. Musculoskeletal: no edema and no tenderness. Neurological: alert, oriented. Skin: unremarkable pacemaker   Psychiatric: normal mood and affect. Behavior is normal. Judgment and thought content normal.     Assessment/Plan:       ICD-10-CM ICD-9-CM    1. Atrial fibrillation with rapid ventricular response (HCC)  I48.91 427.31    2. Cardiac pacemaker in situ  Z95.0 V45.01    3. Tachycardia-bradycardia syndrome ([address]  I49.5 427.81    4. AVNRT (AV juliano re-entry tachycardia) ([address]  I47.1 427.89    5.  Status post catheter ablation of slow pathway Z98.890 V45.89     Z86.79     6. Status post catheter ablation of atrial flutter  Z98.890 V45.89    7. Essential hypertension  I10 401.9    8. PAT (paroxysmal atrial tachycardia) ([facility])  I47.1 427.0      St. Caleb dual chamber pacer functions properly but he has had increasing atrial flutter (atypical) and atrial fibrillation burden with some RVR  He is asymptomatic   So I will try to increase toprol to 50 mg every day first but bp may drop so I will reduce losartan to 25 mg every day  We discussed ablation of atrial flutter/fibrillation together if arrhythmia cannot be controlled to avoid worse tachycardia induced cardiomyopathy   Echo 11/5/2020 LVEF 40% mild AS  Continue with xarelto for stroke prevention    Thank you for involving me in this patient's care and please call with further concerns or questions. Syd Holland M.D.   Electrophysiology/Cardiology  [facility] [place]  [address] , [address]  [phone]

## 2021-03-29 RX ORDER — METOPROLOL SUCCINATE 50 MG/1
50 TABLET, EXTENDED RELEASE ORAL DAILY
Qty: 90 TAB | Refills: 1 | Status: SHIPPED | OUTPATIENT
Start: 2021-03-29

## 2021-03-29 NOTE — TELEPHONE ENCOUNTER
Called pt two patient identifiers confirmed. Verified pharmacy with pt and resent in new medication. Pt verbalized understanding of information discussed w/ no further questions at this time. Cardiologist: Dr. Pedro Black    Last appt: 3/25/2021  Future Appointments   Date Time Provider Floyd Robert   5/3/2021 11:00 AM REMOTE1, TRIXIE YARBROUGH BS AMB       Requested Prescriptions     Signed Prescriptions Disp Refills    metoprolol succinate (TOPROL-XL) 50 mg XL tablet 90 Tab 1     Sig: Take 1 Tab by mouth daily. Authorizing Provider: THAI KUMAR     Ordering User: KARTIK ALDRIDGE         Refills VO per Dr. Pedro Black.

## 2021-03-29 NOTE — TELEPHONE ENCOUNTER
Patient is calling as his metoprolol was increased at his last visit and they went to  the medication it did not reflect the new dose. Please resend with correct dosing. Thanks.

## 2021-05-03 ENCOUNTER — OFFICE VISIT (OUTPATIENT)
Dept: CARDIOLOGY CLINIC | Age: 86
End: 2021-05-03
Payer: MEDICARE

## 2021-05-03 DIAGNOSIS — Z95.0 CARDIAC PACEMAKER IN SITU: Primary | ICD-10-CM

## 2021-05-03 PROCEDURE — 93296 REM INTERROG EVL PM/IDS: CPT | Performed by: INTERNAL MEDICINE

## 2021-05-03 PROCEDURE — 93294 REM INTERROG EVL PM/LDLS PM: CPT | Performed by: INTERNAL MEDICINE

## 2021-09-07 ENCOUNTER — OFFICE VISIT (OUTPATIENT)
Dept: CARDIOLOGY CLINIC | Age: 86
End: 2021-09-07
Payer: MEDICARE

## 2021-09-07 DIAGNOSIS — Z95.0 CARDIAC PACEMAKER IN SITU: Primary | ICD-10-CM

## 2021-09-07 PROCEDURE — 93296 REM INTERROG EVL PM/IDS: CPT | Performed by: INTERNAL MEDICINE

## 2021-09-07 NOTE — LETTER
9/7/2021 3:03 PM    Mr. Rosalina Anderson  [address]  [facility] [id number]          This letter confirms that we have received your scheduled remote check of your implanted     device on 9-7-21  . Our EP team will contact you via phone if there are significant abnormal    findings. Your next remote check from home is scheduled for 12-13-21  . **Please call to schedule overdue annual in clinic appt. **                If you have any questions, please call [facility] \[facility]\"" at [phone].                Sincerely,    Ulises Luong MD [facility]

## 2021-09-21 ENCOUNTER — TELEPHONE (OUTPATIENT)
Dept: CARDIOLOGY CLINIC | Age: 86
End: 2021-09-21

## 2021-09-21 NOTE — TELEPHONE ENCOUNTER
Received alert for HVR during AFL on 09/18/2021 lasting 2 seconds (7 beats) with max V rate 186 bpm.    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 Has had increasing atrial burden since mid January, now >99%. If BP allows, increase Toprol XL to 75 mg po daily.

## 2022-03-18 ENCOUNTER — OFFICE VISIT (OUTPATIENT)
Dept: CARDIOLOGY CLINIC | Age: 87
End: 2022-03-18
Payer: MEDICARE

## 2022-03-18 DIAGNOSIS — Z95.0 CARDIAC PACEMAKER IN SITU: Primary | ICD-10-CM

## 2022-03-18 PROBLEM — R00.2 PALPITATIONS: Status: ACTIVE | Noted: 2018-12-31

## 2022-03-18 PROCEDURE — 93296 REM INTERROG EVL PM/IDS: CPT | Performed by: INTERNAL MEDICINE

## 2022-03-19 PROBLEM — I47.19 AVNRT (AV NODAL RE-ENTRY TACHYCARDIA): Status: ACTIVE | Noted: 2019-03-13

## 2022-03-19 PROBLEM — Z98.890 STATUS POST CATHETER ABLATION OF ATRIAL FLUTTER: Status: ACTIVE | Noted: 2019-03-13

## 2022-03-19 PROBLEM — Z95.0 PACEMAKER: Status: ACTIVE | Noted: 2019-01-03

## 2022-03-19 PROBLEM — Z86.79 STATUS POST CATHETER ABLATION OF SLOW PATHWAY: Status: ACTIVE | Noted: 2019-03-13

## 2022-03-19 PROBLEM — I47.1 AVNRT (AV NODAL RE-ENTRY TACHYCARDIA) (HCC): Status: ACTIVE | Noted: 2019-03-13

## 2022-03-19 PROBLEM — Z98.890 STATUS POST CATHETER ABLATION OF SLOW PATHWAY: Status: ACTIVE | Noted: 2019-03-13

## 2022-05-31 ENCOUNTER — TELEPHONE (OUTPATIENT)
Dept: CARDIOLOGY CLINIC | Age: 87
End: 2022-05-31

## 2022-05-31 DIAGNOSIS — I49.5 TACHYCARDIA-BRADYCARDIA SYNDROME (HCC): ICD-10-CM

## 2022-05-31 DIAGNOSIS — I48.92 ATRIAL FLUTTER, UNSPECIFIED TYPE (HCC): ICD-10-CM

## 2022-05-31 DIAGNOSIS — Z98.890 STATUS POST CATHETER ABLATION OF ATRIAL FLUTTER: ICD-10-CM

## 2022-05-31 DIAGNOSIS — I47.1 PAT (PAROXYSMAL ATRIAL TACHYCARDIA) (HCC): ICD-10-CM

## 2022-05-31 DIAGNOSIS — I48.91 ATRIAL FIBRILLATION WITH RAPID VENTRICULAR RESPONSE (HCC): ICD-10-CM

## 2022-05-31 DIAGNOSIS — Z95.0 CARDIAC PACEMAKER IN SITU: Primary | ICD-10-CM

## 2022-05-31 NOTE — TELEPHONE ENCOUNTER
Received alert for long AMS episode noted on 05/30/2022 at 10:07am.  AFL episode lasted 15 hrs 52 min with max V rate 116 bpm.    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 Has had increasing AT/AF burden since mid January 2021, now >99%. If BP allows, increase Toprol XL to 75 mg po daily. If symptomatic, may need to consider repeat ablation, but would recommend holter monitor to determine whether it's truly all AFL that he's having (typical vs atypical) or some AF as well before considering ablation. Anticoagulated with Xarelto.     Future Appointments   Date Time Provider Floyd Robert   6/29/2022  7:45 AM REMOTE1, TRIXIE RODGERS AMB   9/6/2022 10:20 AM PACEMAKER3, TRIXIE RODGERS AMB   9/6/2022 10:40 AM MD LINNEA Carson AMB

## 2022-06-01 NOTE — TELEPHONE ENCOUNTER
Verified patient with two types of identifiers. Provided alert information and NP recommendations. Scheduled 24 hr holter appt. Pt to call back when he returns to his house to provide blood pressures, to see if we can increase Toprol XL to 75 mg daily.     Future Appointments   Date Time Provider Floyd Robert   6/3/2022  9:00 AM HOLTER, REYNOLDS CAVREY BS AMB   6/29/2022  7:45 AM [email or web address]   9/6/2022 10:20 AM PACEMAKER3, TRIXIE RODGERS AMB   9/6/2022 10:40 AM Denver Hiss, MD CAVREY BS AMB

## 2022-06-01 NOTE — TELEPHONE ENCOUNTER
Verified patient with two types of identifiers. Informed pt and wife that BP is ok to go up to 75 mg Toprol XL daily as recommended by NP. Wife told me that pt has only been taking half doses of Toprol XL (25 mg daily) for some time. Advised to just increase to 50 mg since he was not already at the 50 mg dose, and let us know if BP or HR becomes low going forward. Patient and wife verbalized understanding and will call with any other questions. Will forward to NP to update her on Toprol XL dosing.

## 2022-06-03 ENCOUNTER — CLINICAL SUPPORT (OUTPATIENT)
Dept: CARDIOLOGY CLINIC | Age: 87
End: 2022-06-03
Payer: MEDICARE

## 2022-06-03 DIAGNOSIS — I48.91 ATRIAL FIBRILLATION, UNSPECIFIED TYPE (HCC): Primary | ICD-10-CM

## 2022-06-03 PROCEDURE — 93225 XTRNL ECG REC<48 HRS REC: CPT | Performed by: INTERNAL MEDICINE

## 2022-06-03 NOTE — TELEPHONE ENCOUNTER
Patient came in to have his holter monitor applied.   Provided list of BP and HR readings to give to Dr. Zoran Mcbride NP:    5/29/22  123/76  72  5/31/22  113/61  85  6/02/22  116/67  76  6/03/22  129/69  71    Will forward to Uday Garibay NP.

## 2022-06-03 NOTE — TELEPHONE ENCOUNTER
Verified patient with two types of identifiers. Spoke with patient's wife. Notified of NP recommendations. Patient verbalized understanding and will call with any other questions.       Future Appointments   Date Time Provider Floyd Robert   6/29/2022  7:45 AM REMOTE1, TRIXIE RODGERS AMB   9/6/2022 10:20 AM PACEMAKER3, TRIXIE RODGERS AMB   9/6/2022 10:40 AM MD LINNEA Mckeon AMB

## 2022-06-14 PROCEDURE — 93227 XTRNL ECG REC<48 HR R&I: CPT | Performed by: INTERNAL MEDICINE

## 2022-06-22 ENCOUNTER — DOCUMENTATION ONLY (OUTPATIENT)
Dept: CARDIOLOGY CLINIC | Age: 87
End: 2022-06-22

## 2022-06-23 ENCOUNTER — TELEPHONE (OUTPATIENT)
Dept: CARDIOLOGY CLINIC | Age: 87
End: 2022-06-23

## 2022-06-23 DIAGNOSIS — I47.1 AVNRT (AV NODAL RE-ENTRY TACHYCARDIA) (HCC): ICD-10-CM

## 2022-06-23 DIAGNOSIS — I48.91 ATRIAL FIBRILLATION, UNSPECIFIED TYPE (HCC): Primary | ICD-10-CM

## 2022-06-23 DIAGNOSIS — I48.92 ATRIAL FLUTTER, UNSPECIFIED TYPE (HCC): ICD-10-CM

## 2022-06-23 DIAGNOSIS — Z95.0 CARDIAC PACEMAKER IN SITU: ICD-10-CM

## 2022-06-23 DIAGNOSIS — I48.91 ATRIAL FIBRILLATION WITH RAPID VENTRICULAR RESPONSE (HCC): ICD-10-CM

## 2022-06-23 DIAGNOSIS — I47.1 PAT (PAROXYSMAL ATRIAL TACHYCARDIA) (HCC): ICD-10-CM

## 2022-06-23 DIAGNOSIS — Z98.890 STATUS POST CATHETER ABLATION OF ATRIAL FLUTTER: ICD-10-CM

## 2022-06-23 NOTE — TELEPHONE ENCOUNTER
Called patient, ID verified using two patient identifiers. Spoke with patient's wife. Notified her that I forgot to mention the need for a cardiac MRI prior to the ablation in October. Someone will be calling them to schedule the MRI from [facility]. Patient verbalized understanding and will call with any other questions.

## 2022-06-23 NOTE — TELEPHONE ENCOUNTER
Haven Camacho MD Yesterday (11:34 AM)        holter showed AFL AF RVR and PVC, 3 beat NSVT  Recommend cryoablation         Called patient, ID verified using two patient identifiers. Notified patient of Dr. Turner Ugalde above recommendations. Patient agreeable and scheduled for a AFIB cryo ablation at [facility] on Friday, 10/28/22 at 7:30 am.    Reviewed pre-procedure instructions with patient and time allowed for questions. Instructions to be mailed today to the address confirmed on file. Patient verbalized understanding and will call with any other questions. Future Appointments   Date Time Provider Floyd Robert   6/29/2022  7:45 AM Jennifer YARBROUGH BS AMB   9/6/2022 10:20 AM [email or web address] AMB   9/6/2022 10:40 AM MD LINNEA Bowers  AMB     Patient sees Dr. Meredith Cabrera in September and will discuss the procedure with him at this time. He will call back if he feels he needs to be seen sooner.     Orders Placed This Encounter    MRI CARDIAC [facility] FUNC W WO CONT     Standing Status:   Future     Standing Expiration Date:   12/23/2022     Scheduling Instructions:      AFIB Protocol - pulmonary vein mapping prior to AFIB ablation     Order Specific Question:   STAT Creatinine as indicated     Answer:   Yes     Order Specific Question:   Reason for Exam     Answer:   Pulmonary Vein Mapping prior to AFIB ablation    CBC W/O DIFF     Standing Status:   Future     Standing Expiration Date:   8/91/6779    METABOLIC PANEL, BASIC     Standing Status:   Future     Standing Expiration Date:   6/23/2023

## 2022-06-29 ENCOUNTER — OFFICE VISIT (OUTPATIENT)
Dept: CARDIOLOGY CLINIC | Age: 87
End: 2022-06-29
Payer: MEDICARE

## 2022-06-29 DIAGNOSIS — Z95.0 CARDIAC PACEMAKER IN SITU: Primary | ICD-10-CM

## 2022-06-29 PROCEDURE — 93296 REM INTERROG EVL PM/IDS: CPT | Performed by: INTERNAL MEDICINE

## 2022-06-29 NOTE — LETTER
6/29/2022 9:30 AM    Mr. León Zan  [address]  [facility] [phone]            This letter confirms that we have received your scheduled remote check of your implanted     device on 6-29-22  . Our EP team will contact you via phone if there are significant abnormal    findings. Your next in-clinic device check is scheduled for 9-6-22 at 10:20am  .                   If you have any questions, please call [address] [place] [address] Drive at [phone].                Sincerely,    Benja Tadeo MD [facility]

## 2022-08-31 ENCOUNTER — TELEPHONE (OUTPATIENT)
Dept: CARDIOLOGY CLINIC | Age: 87
End: 2022-08-31

## 2022-08-31 NOTE — TELEPHONE ENCOUNTER
Verified patient with two types of identifiers. Spoke with patient's wife verified on PHI. She states patient has been feeling good lately and just had a good report from his PCP. Notified that we are scheduling ablations into January at this time so would recommend coming in and speaking with Dr. Meryl Rodriguez regarding ablation prior to cancelling. Patient is agreeable. Patient verbalized understanding and will call with any other questions.       Future Appointments   Date Time Provider Floyd Robert   9/8/2022  1:00 PM MD LINNEA Centeno BS AMB   10/28/2022  7:00 AM [facility] CATH LAB [facility]    10/28/2022  7:30 AM [facility] ANESTHESIA SMHANES RI OR PRE AS   11/15/2022  9:00 AM [email or web address], TRIXIE RODGERS AMB   11/15/2022  9:20 AM MD LINNEA Centeno BS AMB   12/12/2022  9:00 AM REMOTE1, TRIXIE RODGERS AMB

## 2022-08-31 NOTE — TELEPHONE ENCOUNTER
Called to rs 9/6/2022 appt with Dr Lisa Solorzano, while speaking with the pts wife she stated that the pt may not want to go through with the ablation that is scheduled on 10/28/2022.        Call back: [phone]

## 2022-09-08 ENCOUNTER — OFFICE VISIT (OUTPATIENT)
Dept: CARDIOLOGY CLINIC | Age: 87
End: 2022-09-08
Payer: MEDICARE

## 2022-09-08 VITALS
WEIGHT: 179.8 LBS | HEIGHT: 70 IN | BODY MASS INDEX: 25.74 KG/M2 | OXYGEN SATURATION: 98 % | SYSTOLIC BLOOD PRESSURE: 118 MMHG | HEART RATE: 70 BPM | DIASTOLIC BLOOD PRESSURE: 68 MMHG | RESPIRATION RATE: 16 BRPM

## 2022-09-08 DIAGNOSIS — Z86.79 STATUS POST CATHETER ABLATION OF SLOW PATHWAY: ICD-10-CM

## 2022-09-08 DIAGNOSIS — I48.91 ATRIAL FIBRILLATION WITH RAPID VENTRICULAR RESPONSE (HCC): Primary | ICD-10-CM

## 2022-09-08 DIAGNOSIS — I47.1 AVNRT (AV NODAL RE-ENTRY TACHYCARDIA) (HCC): ICD-10-CM

## 2022-09-08 DIAGNOSIS — Z95.0 CARDIAC PACEMAKER IN SITU: ICD-10-CM

## 2022-09-08 DIAGNOSIS — I47.1 PAT (PAROXYSMAL ATRIAL TACHYCARDIA) (HCC): ICD-10-CM

## 2022-09-08 DIAGNOSIS — I10 ESSENTIAL HYPERTENSION: ICD-10-CM

## 2022-09-08 DIAGNOSIS — I48.92 ATRIAL FLUTTER, UNSPECIFIED TYPE (HCC): ICD-10-CM

## 2022-09-08 DIAGNOSIS — Z98.890 STATUS POST CATHETER ABLATION OF SLOW PATHWAY: ICD-10-CM

## 2022-09-08 PROCEDURE — G8432 DEP SCR NOT DOC, RNG: HCPCS | Performed by: INTERNAL MEDICINE

## 2022-09-08 PROCEDURE — 1101F PT FALLS ASSESS-DOCD LE1/YR: CPT | Performed by: INTERNAL MEDICINE

## 2022-09-08 PROCEDURE — 1123F ACP DISCUSS/DSCN MKR DOCD: CPT | Performed by: INTERNAL MEDICINE

## 2022-09-08 PROCEDURE — G8536 NO DOC ELDER MAL SCRN: HCPCS | Performed by: INTERNAL MEDICINE

## 2022-09-08 PROCEDURE — 99214 OFFICE O/P EST MOD 30 MIN: CPT | Performed by: INTERNAL MEDICINE

## 2022-09-08 PROCEDURE — G8417 CALC BMI ABV UP PARAM F/U: HCPCS | Performed by: INTERNAL MEDICINE

## 2022-09-08 PROCEDURE — G8427 DOCREV CUR MEDS BY ELIG CLIN: HCPCS | Performed by: INTERNAL MEDICINE

## 2022-09-08 NOTE — PROGRESS NOTES
Cardiac Electrophysiology OFFICE VISIT Note         Subjective: Yeny Das is a 80 y.o. patient who presents for follow up of persistent AF/AFL. He is currently scheduled for AF ablation on 10/28/2022. Currently on Toprol XL for rate control. Holter monitor in 06/2022 showed persistent AF/AFL with RVR, PVCs, 3 beat NSVT. AT/AF burden via pacemaker in 07/2022 was >99%. States he is feeling well, denies chest pain, palpitations, SOB, PND, orthopnea, syncope, or edema. BP controlled. Anticoagulated with Xarelto, had previous epistaxis but nothing recently. 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 [employer]aleb dual chamber pacemaker (DOI 01/04/2019). He does not see Dr. Koki Coburn any more. S/p CABG.        Problem List    Status post catheter ablation of slow pathway ICD-10-CM: Z98.890, Z86.79   ICD-9-CM: V45.89 3/13/2019   Overview Signed 3/13/2019 11:26 AM by Bertin Hidalgo MD     3/13/2019         Status post catheter ablation of atrial flutter ICD-10-CM: Z98.890   ICD-9-CM: V45.89 3/13/2019   Overview Signed 3/13/2019 11:27 AM by Bertin Hidalgo MD     3/13/2019         AVNRT (AV juliano re-entry tachycardia) [facility]) ICD-10-CM: I47.1   ICD-9-CM: 427.89 3/13/2019     Pacemaker ICD-10-CM: Z95.0   ICD-9-CM: V45.01 1/3/2019   Overview Signed 1/3/2019  4:13 PM by Bertin Hidalgo MD     [employer] Caleb pacer dual chamber 1/3/2019         Arthritis ICD-10-CM: M19.90   ICD-9-CM: 716.90 Unknown     Hypercholesterolemia ICD-10-CM: E78.00   ICD-9-CM: 272.0 Unknown     Hypertension ICD-10-CM: I10   ICD-9-CM: 401.9 Unknown     SVT (supraventricular tachycardia) ([address] ICD-10-CM: I47.1   ICD-9-CM: 427.89 Unknown     Palpitations ICD-10-CM: R00.2   ICD-9-CM: 785.1 12/31/2018     Esophageal reflux ICD-10-CM: K21.9   ICD-9-CM: 530.81 12/4/2011        Current Outpatient Medications on File Prior to Visit   Medication Sig Dispense Refill    metoprolol succinate (TOPROL-XL) 50 mg XL tablet Take 1 Tab by mouth daily. 90 Tab 1    losartan (COZAAR) 25 mg tablet Take 1 Tab by mouth daily. 90 Tab 1    Xarelto 20 mg tab tablet Take 1 tablet by mouth once daily 30 Tab 0    dorzolamide (TRUSOPT) 2 % ophthalmic solution INSTILL 1 DROP  2 TIMES EVERY DAY INTO RIGHT EYE      brimonidine (ALPHAGAN) 0.2 % ophthalmic solution Administer 1 Drop to right eye two (2) times a day. latanoprost (XALATAN) 0.005 % ophthalmic solution Administer 1 Drop to right eye nightly. omeprazole (PRILOSEC) 20 mg capsule Take 20 mg by mouth daily. rosuvastatin (CRESTOR) 10 mg tablet Take 10 mg by mouth daily. VIT A,C & E/NIAC/B2/LUT/MN/GLU (EYE-MARYA PO) Take  by mouth. No current facility-administered medications on file prior to visit. Allergies   Allergen Reactions   · Lipitor [Atorvastatin] Rash and Itching   · Oxycodone Rash   · Percocet [Oxycodone-Acetaminophen] Rash     Past Medical History:   Diagnosis Date   · Arthritis   · Hypercholesterolemia   · Hypertension   · SVT (supraventricular tachycardia) (HCC)     Past Surgical History:   Procedure Laterality Date   · HX ORTHOPAEDIC 2/10   L shoulder replacement   · GA COMPRE EP EVAL ABLTJ 3D MAPG TX SVT N/A 3/13/2019   ABLATION A-FLUTTER performed by Camilla Taylor MD at [facility], [facility]/s  CATH LAB   ·  [address] ADD ON N/A 3/13/2019   Ablation Svt/Vt Add On performed by Camilla Taylor MD at [address] [place] [address], [facility]  CATH LAB   · GA INS NEW/RPLCMT PRM PM W/TRANSV ELTRD ATRIAL&VENT 1/3/2019     · GA INTRACARDIAC ELECTROPHYSIOLOGIC 3D MAPPING N/A 3/13/2019   Ep 3d Mapping performed by Camilla Taylor MD at [address] [place] [address], [facility]/s  CATH LAB       Social History     Tobacco Use   · Smoking status: Never   · Smokeless tobacco: Never   Substance Use Topics   · Alcohol use: No         Review of Systems: All other systems negative. Constitutional: Negative for fever, chills, weight loss   HEENT: Negative for vision changes.    Respiratory: Negative for cough, hemoptysis. Cardiovascular: Negative for chest pain, no orthopnea, claudication, leg swelling, syncope, and PND. Gastrointestinal: Negative for nausea, vomiting, diarrhea, blood in stool and melena. Genitourinary: Negative for dysuria, and hematuria. Musculoskeletal: Negative for myalgias, arthralgia. Skin: No rash. Heme: Does not bleed or bruise easily. Neurological: Negative for speech change and focal weakness. Objective:     Visit Vitals   /68 (BP 1 Location: Left upper arm, BP Patient Position: Sitting, BP Cuff Size: Adult)   Pulse 70   Resp 16   Ht 5' 10\" (1.778 m)   Wt 179 lb 12.8 oz (81.6 kg)   SpO2 98%   BMI 25.80 kg/m²       Constitutional: Well-developed and well-nourished. No distress. Head: Normocephalic and atraumatic. Eyes: Pupils are equal, round . Neck: Neck supple. No JVD present. Cardiovascular: Normal rate, regular rhythm and normal heart sounds. Exam reveals no gallop and no friction rub. 2/6 RUSB systolically murmur heard. Pulmonary/Chest: Effort normal and breath sounds normal. No wheezes. Abdominal: Soft, no rebound. Musculoskeletal: No edema and no tenderness. Neurological: Alert, oriented. Skin: unremarkable pacemaker   Psychiatric: normal mood and affect. Behavior is normal. Judgment and thought content normal.     Assessment/Plan:     Imaging/Studies:   Holter (06/2022): HR  bpm, avg 70 bpm.  AF/AFL burden 100%. Single NSVT x 3 beats. Echo (11/05/2020): LVEF 40%. Mild AS. ICD-10-CM ICD-9-CM    1. Atrial fibrillation with rapid ventricular response (HCC)  I48.91 427.31       2. Cardiac pacemaker in situ  Z95.0 V45.01       3. PAT (paroxysmal atrial tachycardia) ([facility])  I47.1 427.0       4. Atrial flutter, unspecified type ([facility]  I48.92 427.32       5. Essential hypertension  I10 401.9       6. Status post catheter ablation of slow pathway  Z98.890 V45.89     Z86.79        7.  AVNRT (AV juliano re-entry tachycardia) ([facility]  I47.1 427.89                    Persistent AF/AFL: Rare RVR, asymptomatic on Toprol XL for rate control. States he feels well. Discussed atrial remodeling that occurs with atrial fibrillation. He would like to postpone his ablation indefinitely. He will let us know if he'd like to reschedule if symptomatic    [employer]aleb dual chamber pacemaker (DOI 01/04/2019): Device check on 06/29/2022 showed proper lead & generator function. Generator longevity estimated 6 years, 11 months. RA 1%, RV 90%. Since 03/18/2022, 2851 AMS episodes noted; EGMs show AF/AFL events. HVR x 2 during AFL lasting 2 seconds with max V rate 186 bpm.  AT/AF burden >99%. NICM/mild AS: LVEF 40% in 11/2020. NYHA II chronic systolic CHF. Continue GDMT. Continue to monitor echo if he does not see Dr Stephanie Hernandez practice  He is asymptomatic  He wants 9 mo follow up    HTN: BP controlled. Continue current medication regimen. Anticoagulation: Continue Xarelto for embolic CVA prophylaxis. Denies bleeding issues. Remote pacer checks q 3 months. Follow up in EP clinic as noted below. Future Appointments   Date Time Provider Floyd Robert   10/28/2022  7:00 AM [facility] CATH LAB 2 [facility]    10/28/2022  7:30 AM [facility] ANESTHESIA SMHANES RI OR PRE AS   11/15/2022  9:00 AM PACEMAKER3TRIXIE BS AMB   11/15/2022  9:20 AM MD LINNEA Dasilva BS AMB   12/12/2022  9:00 AM REMOTE1, TRIXIE YARBROUGH BS AMB   6/1/2023  9:20 AM MD LINNEA Dasilva BS AMB         Thank you for involving me in this patient's care and please call with further concerns or questions. Gabriel Carbajal M.D.    Electrophysiology/Cardiology   [facility] and [facility] [place]   [address], [address], [address]   [address]   [phone]

## 2022-11-15 ENCOUNTER — CLINICAL SUPPORT (OUTPATIENT)
Dept: CARDIOLOGY CLINIC | Age: 87
End: 2022-11-15
Payer: MEDICARE

## 2022-11-15 ENCOUNTER — OFFICE VISIT (OUTPATIENT)
Dept: CARDIOLOGY CLINIC | Age: 87
End: 2022-11-15

## 2022-11-15 VITALS
BODY MASS INDEX: 26.48 KG/M2 | OXYGEN SATURATION: 100 % | DIASTOLIC BLOOD PRESSURE: 78 MMHG | HEIGHT: 70 IN | RESPIRATION RATE: 18 BRPM | SYSTOLIC BLOOD PRESSURE: 118 MMHG | WEIGHT: 185 LBS | HEART RATE: 70 BPM

## 2022-11-15 DIAGNOSIS — I10 ESSENTIAL HYPERTENSION: ICD-10-CM

## 2022-11-15 DIAGNOSIS — Z98.890 S/P CATHETER ABLATION OF SLOW PATHWAY: ICD-10-CM

## 2022-11-15 DIAGNOSIS — Z95.0 CARDIAC PACEMAKER IN SITU: Primary | ICD-10-CM

## 2022-11-15 DIAGNOSIS — I25.83 CORONARY ARTERY DISEASE DUE TO LIPID RICH PLAQUE: ICD-10-CM

## 2022-11-15 DIAGNOSIS — Z98.890 STATUS POST CATHETER ABLATION OF ATRIAL FLUTTER: ICD-10-CM

## 2022-11-15 DIAGNOSIS — I25.10 CORONARY ARTERY DISEASE DUE TO LIPID RICH PLAQUE: ICD-10-CM

## 2022-11-15 DIAGNOSIS — I47.1 PAT (PAROXYSMAL ATRIAL TACHYCARDIA) (HCC): ICD-10-CM

## 2022-11-15 DIAGNOSIS — Z86.79 S/P CATHETER ABLATION OF SLOW PATHWAY: ICD-10-CM

## 2022-11-15 DIAGNOSIS — Z79.01 ANTICOAGULATED: ICD-10-CM

## 2022-11-15 DIAGNOSIS — I48.19 PERSISTENT ATRIAL FIBRILLATION (HCC): ICD-10-CM

## 2022-11-15 PROCEDURE — G8417 CALC BMI ABV UP PARAM F/U: HCPCS | Performed by: INTERNAL MEDICINE

## 2022-11-15 PROCEDURE — G8510 SCR DEP NEG, NO PLAN REQD: HCPCS | Performed by: INTERNAL MEDICINE

## 2022-11-15 PROCEDURE — G8427 DOCREV CUR MEDS BY ELIG CLIN: HCPCS | Performed by: INTERNAL MEDICINE

## 2022-11-15 PROCEDURE — 1123F ACP DISCUSS/DSCN MKR DOCD: CPT | Performed by: INTERNAL MEDICINE

## 2022-11-15 PROCEDURE — 93288 INTERROG EVL PM/LDLS PM IP: CPT | Performed by: INTERNAL MEDICINE

## 2022-11-15 PROCEDURE — G8536 NO DOC ELDER MAL SCRN: HCPCS | Performed by: INTERNAL MEDICINE

## 2022-11-15 PROCEDURE — 99214 OFFICE O/P EST MOD 30 MIN: CPT | Performed by: INTERNAL MEDICINE

## 2022-11-15 PROCEDURE — 1101F PT FALLS ASSESS-DOCD LE1/YR: CPT | Performed by: INTERNAL MEDICINE

## 2022-11-15 NOTE — PROGRESS NOTES
Cardiac Electrophysiology OFFICE VISIT Note         Subjective: David Owens is a 80 y.o. patient who presents for follow up of persistent AF/AFL. He is currently scheduled for AF ablation on 10/28/2022. AT/AF burden via pacemaker was >99%. States he is feeling well, denies chest pain, palpitations, SOB, PND, orthopnea, syncope, or edema. BP controlled. Anticoagulated with Xarelto, had previous epistaxis but nothing recently. 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 [employer]aleb dual chamber pacemaker (DOI 01/04/2019). He does not see Dr. Bryanna Mercado any more. S/p CABG. Problem List    Status post catheter ablation of slow pathway ICD-10-CM: Z98.890, Z86.79   ICD-9-CM: V45.89 3/13/2019   Overview Signed 3/13/2019 11:26 AM by Loree Rowley MD     3/13/2019         Status post catheter ablation of atrial flutter ICD-10-CM: Z98.890   ICD-9-CM: V45.89 3/13/2019   Overview Signed 3/13/2019 11:27 AM by Loree Rowley MD     3/13/2019         AVNRT (AV juliano re-entry tachycardia) ([facility] ICD-10-CM: I47.1   ICD-9-CM: 427.89 3/13/2019     Pacemaker ICD-10-CM: Z95.0   ICD-9-CM: V45.01 1/3/2019   Overview Signed 1/3/2019  4:13 PM by Loree Rowley MD     [employer] Caleb pacer dual chamber 1/3/2019         Arthritis ICD-10-CM: M19.90   ICD-9-CM: 716.90 Unknown     Hypercholesterolemia ICD-10-CM: E78.00   ICD-9-CM: 272.0 Unknown     Hypertension ICD-10-CM: I10   ICD-9-CM: 401.9 Unknown     SVT (supraventricular tachycardia) ([facility] ICD-10-CM: I47.1   ICD-9-CM: 427.89 Unknown     Palpitations ICD-10-CM: R00.2   ICD-9-CM: 785.1 12/31/2018     Esophageal reflux ICD-10-CM: K21.9   ICD-9-CM: 530.81 12/4/2011        Current Outpatient Medications on File Prior to Visit   Medication Sig Dispense Refill    metoprolol succinate (TOPROL-XL) 50 mg XL tablet Take 1 Tab by mouth daily.  90 Tab 1    losartan (COZAAR) 25 mg tablet Take 1 Tab by mouth daily. 90 Tab 1    Xarelto 20 mg tab tablet Take 1 tablet by mouth once daily 30 Tab 0    dorzolamide (TRUSOPT) 2 % ophthalmic solution INSTILL 1 DROP  2 TIMES EVERY DAY INTO RIGHT EYE      brimonidine (ALPHAGAN) 0.2 % ophthalmic solution Administer 1 Drop to right eye two (2) times a day. latanoprost (XALATAN) 0.005 % ophthalmic solution Administer 1 Drop to right eye nightly. omeprazole (PRILOSEC) 20 mg capsule Take 20 mg by mouth daily. rosuvastatin (CRESTOR) 10 mg tablet Take 10 mg by mouth daily. VIT A,C & E/NIAC/B2/LUT/MN/GLU (EYE-MARYA PO) Take  by mouth. No current facility-administered medications on file prior to visit. Allergies   Allergen Reactions   · Lipitor [Atorvastatin] Rash and Itching   · Oxycodone Rash   · Percocet [Oxycodone-Acetaminophen] Rash     Past Medical History:   Diagnosis Date   · Arthritis   · Hypercholesterolemia   · Hypertension   · SVT (supraventricular tachycardia) (HCC)     Past Surgical History:   Procedure Laterality Date   · HX ORTHOPAEDIC 2/10   L shoulder replacement   · CA COMPRE EP EVAL ABLTJ 3D MAPG TX SVT N/A 3/13/2019   ABLATION A-FLUTTER performed by Fifi Friend MD at [address] CATH LAB   ·  [address] ADD ON N/A 3/13/2019   Ablation Svt/Vt Add On performed by Fifi Friend MD at [address]  CATH LAB   · CA INS NEW/RPLCMT PRM PM W/TRANSV ELTRD ATRIAL&VENT 1/3/2019     · CA INTRACARDIAC ELECTROPHYSIOLOGIC 3D MAPPING N/A 3/13/2019   Ep 3d Mapping performed by Fifi Friend MD at [address]  CATH LAB       Social History     Tobacco Use   · Smoking status: Never   · Smokeless tobacco: Never   Substance Use Topics   · Alcohol use: No         Review of Systems: All other systems negative. Constitutional: Negative for fever, chills, weight loss   HEENT: Negative for vision changes. Respiratory: Negative for cough, hemoptysis.    Cardiovascular: Negative for chest pain, no orthopnea, claudication, leg swelling, syncope, and PND. + numbness in both arms at night, movement made it better  Gastrointestinal: Negative for nausea, vomiting, diarrhea, blood in stool and melena. Genitourinary: Negative for dysuria, and hematuria. Musculoskeletal: Negative for myalgias, arthralgia. Skin: No rash. Heme: Does not bleed or bruise easily. Neurological: Negative for speech change and focal weakness. Objective:   Visit Vitals  /78 (BP 1 Location: Left upper arm, BP Patient Position: Sitting, BP Cuff Size: Adult)   Pulse 70   Resp 18   Ht 5' 10\" (1.778 m)   Wt 185 lb (83.9 kg)   SpO2 100%   BMI 26.54 kg/m²         Constitutional: Well-developed and well-nourished. No distress. Head: Normocephalic and atraumatic. Eyes: Pupils are equal, round . Neck: Neck supple. No JVD present. Cardiovascular: Normal rate, regular rhythm and normal heart sounds. Exam reveals no gallop and no friction rub. 2/6 RUSB systolically murmur heard. Pulmonary/Chest: Effort normal and breath sounds normal. No wheezes. Abdominal: Soft, no rebound. Musculoskeletal: No edema and no tenderness. Neurological: Alert, oriented. Skin: unremarkable left chest pacemaker   Psychiatric: normal mood and affect. Behavior is normal. Judgment and thought content normal.     Assessment/Plan:     Imaging/Studies:   Holter (06/2022): HR  bpm, avg 70 bpm.  AF/AFL burden 100%. Single NSVT x 3 beats. Echo (11/05/2020): LVEF 40%. Mild AS. ICD-10-CM ICD-9-CM    1. Cardiac pacemaker in situ  Z95.0 V45.01       2. Status post catheter ablation of atrial flutter  Z98.890 V45.89       3. S/P catheter ablation of slow pathway  Z98.890 V45.89     Z86.79        4. Persistent atrial fibrillation (HCC)  I48.19 427.31       5. Anticoagulated  Z79.01 V58.61       6. Essential hypertension  I10 401.9       7. PAT (paroxysmal atrial tachycardia) (HCC)  I47.1 427.0       8.  Coronary artery disease due to lipid rich plaque  I25.10 414.00     I25.83 414.3         Persistent AF/AFL: asymptomatic so no ablation  He is RV pacing    [employer] Caleb dual chamber pacemaker (DOI 01/04/2019): Device check on 06/29/2022 showed proper lead & generator function. Generator longevity estimated 6 years, 4 months. RV pacing 100%    NICM/mild AS: LVEF 40% in 11/2020. NYHA II chronic systolic CHF. Continue GDMT. Continue to monitor echo if he does not see Dr Renay Davis practice  He confirmed he does not go to Dr Renay Davis any more so I will schedule 2 D echo for LVEF and AS follow up    Upper arm numbness and better with movement  He had CABG and this does not sound PAD or neuropathic  He and his wife are concerned so I recommend aspirin 81 mg every day given he had CABG and will check upper extremities for PAD    CAD CABG- no angina so not checking stress test yet    HTN: BP controlled. Continue current medication regimen. Anticoagulation: Continue Xarelto for embolic CVA prophylaxis. Denies bleeding issues. Remote pacer checks q 3 months. Follow up in EP clinic as noted below. Future Appointments   Date Time Provider Floyd Robert   12/12/2022  9:00 AM REMOTE1, TRIXIE YARBROUGH BS AMB   1/9/2023  9:00 AM ECHOTWO, TRIXIE YARBROUGH BS AMB   1/9/2023 10:00 AM VASCULAR, TRIXIE YARBROUGH BS AMB   3/13/2023 11:00 AM REMOTE1, TRIXIE YARBROUGH BS AMB   6/14/2023  7:30 AM REMOTE1, TRIXIE YARBROUGH BS AMB   9/7/2023 11:20 AM PACEMAKER3, TRIXIE YARBROUGH BS AMB   9/7/2023 11:40 AM MD LINNEA Nguyen BS AMB         Thank you for involving me in this patient's care and please call with further concerns or questions. John Araya M.D.    Electrophysiology/Cardiology   [facility] and [facility] [place]   [address], [address] , [address]   [address]   [phone]

## 2022-11-15 NOTE — PROGRESS NOTES
C/ ABBOTT PACER CHECK IN CLINIC  Device functioning appropriately as programmed.    See scanned documents

## 2022-12-12 ENCOUNTER — OFFICE VISIT (OUTPATIENT)
Dept: CARDIOLOGY CLINIC | Age: 87
End: 2022-12-12
Payer: MEDICARE

## 2022-12-12 DIAGNOSIS — Z95.0 CARDIAC PACEMAKER IN SITU: Primary | ICD-10-CM

## 2023-01-09 ENCOUNTER — CLINICAL SUPPORT (OUTPATIENT)
Dept: CARDIOLOGY CLINIC | Age: 88
End: 2023-01-09
Payer: MEDICARE

## 2023-01-09 ENCOUNTER — ANCILLARY PROCEDURE (OUTPATIENT)
Dept: CARDIOLOGY CLINIC | Age: 88
End: 2023-01-09

## 2023-01-09 VITALS
SYSTOLIC BLOOD PRESSURE: 118 MMHG | HEIGHT: 70 IN | WEIGHT: 185 LBS | BODY MASS INDEX: 26.48 KG/M2 | DIASTOLIC BLOOD PRESSURE: 78 MMHG

## 2023-01-09 DIAGNOSIS — I50.9 CONGESTIVE HEART FAILURE, UNSPECIFIED HF CHRONICITY, UNSPECIFIED HEART FAILURE TYPE (HCC): ICD-10-CM

## 2023-01-09 DIAGNOSIS — I48.91 ATRIAL FIBRILLATION, UNSPECIFIED TYPE (HCC): ICD-10-CM

## 2023-01-09 DIAGNOSIS — R20.0 ARM NUMBNESS: ICD-10-CM

## 2023-01-09 DIAGNOSIS — I35.0 AORTIC VALVE STENOSIS, ETIOLOGY OF CARDIAC VALVE DISEASE UNSPECIFIED: ICD-10-CM

## 2023-01-09 LAB
LEFT ARM BP: 153 MMHG
LEFT PROX RADIAL A BP: 165 MMHG
LEFT PROX ULNAR A BP: 163 MMHG
LEFT WBI: 1.08
RIGHT ARM BP: 151 MMHG
RIGHT PROX RADIAL A BP: 168 MMHG
RIGHT PROX ULNAR A BP: 174 MMHG
RIGHT WBI: 1.14

## 2023-01-09 PROCEDURE — 93923 UPR/LXTR ART STDY 3+ LVLS: CPT | Performed by: SPECIALIST

## 2023-01-10 LAB
ECHO AO ASC DIAM: 3.4 CM
ECHO AO ASCENDING AORTA INDEX: 1.68 CM/M2
ECHO AO ROOT DIAM: 3 CM
ECHO AO ROOT INDEX: 1.49 CM/M2
ECHO AR MAX VEL PISA: 4.7 M/S
ECHO AV AREA PEAK VELOCITY: 0.9 CM2
ECHO AV AREA VTI: 0.8 CM2
ECHO AV AREA/BSA PEAK VELOCITY: 0.4 CM2/M2
ECHO AV AREA/BSA VTI: 0.4 CM2/M2
ECHO AV MEAN GRADIENT: 25 MMHG
ECHO AV MEAN VELOCITY: 2.4 M/S
ECHO AV PEAK GRADIENT: 42 MMHG
ECHO AV PEAK VELOCITY: 3.2 M/S
ECHO AV REGURGITANT PHT: 626 MILLISECOND
ECHO AV VELOCITY RATIO: 0.22
ECHO AV VTI: 66.9 CM
ECHO EST RA PRESSURE: 3 MMHG
ECHO LA DIAMETER INDEX: 2.33 CM/M2
ECHO LA DIAMETER: 4.7 CM
ECHO LA TO AORTIC ROOT RATIO: 1.57
ECHO LA VOL 2C: 78 ML (ref 18–58)
ECHO LA VOL 4C: 101 ML (ref 18–58)
ECHO LA VOL BP: 91 ML (ref 18–58)
ECHO LA VOL/BSA BIPLANE: 45 ML/M2 (ref 16–34)
ECHO LA VOLUME AREA LENGTH: 96 ML
ECHO LA VOLUME INDEX A2C: 39 ML/M2 (ref 16–34)
ECHO LA VOLUME INDEX A4C: 50 ML/M2 (ref 16–34)
ECHO LA VOLUME INDEX AREA LENGTH: 48 ML/M2 (ref 16–34)
ECHO LV E' LATERAL VELOCITY: 7 CM/S
ECHO LV E' SEPTAL VELOCITY: 4 CM/S
ECHO LV EDV A2C: 36 ML
ECHO LV EDV A4C: 92 ML
ECHO LV EDV BP: 57 ML (ref 67–155)
ECHO LV EDV INDEX A4C: 46 ML/M2
ECHO LV EDV INDEX BP: 28 ML/M2
ECHO LV EDV NDEX A2C: 18 ML/M2
ECHO LV EJECTION FRACTION A2C: 19 %
ECHO LV EJECTION FRACTION A4C: 39 %
ECHO LV EJECTION FRACTION BIPLANE: 25 % (ref 55–100)
ECHO LV ESV A2C: 29 ML
ECHO LV ESV A4C: 56 ML
ECHO LV ESV BP: 43 ML (ref 22–58)
ECHO LV ESV INDEX A2C: 14 ML/M2
ECHO LV ESV INDEX A4C: 28 ML/M2
ECHO LV ESV INDEX BP: 21 ML/M2
ECHO LV FRACTIONAL SHORTENING: 9 % (ref 28–44)
ECHO LV INTERNAL DIMENSION DIASTOLE INDEX: 2.18 CM/M2
ECHO LV INTERNAL DIMENSION DIASTOLIC: 4.4 CM (ref 4.2–5.9)
ECHO LV INTERNAL DIMENSION SYSTOLIC INDEX: 1.98 CM/M2
ECHO LV INTERNAL DIMENSION SYSTOLIC: 4 CM
ECHO LV IVSD: 1.3 CM (ref 0.6–1)
ECHO LV MASS 2D: 203 G (ref 88–224)
ECHO LV MASS INDEX 2D: 100.5 G/M2 (ref 49–115)
ECHO LV POSTERIOR WALL DIASTOLIC: 1.2 CM (ref 0.6–1)
ECHO LV RELATIVE WALL THICKNESS RATIO: 0.55
ECHO LVOT AREA: 4.2 CM2
ECHO LVOT AV VTI INDEX: 0.2
ECHO LVOT DIAM: 2.3 CM
ECHO LVOT MEAN GRADIENT: 1 MMHG
ECHO LVOT PEAK GRADIENT: 2 MMHG
ECHO LVOT PEAK VELOCITY: 0.7 M/S
ECHO LVOT STROKE VOLUME INDEX: 27.5 ML/M2
ECHO LVOT SV: 55.6 ML
ECHO LVOT VTI: 13.4 CM
ECHO MV A VELOCITY: 0.39 M/S
ECHO MV E DECELERATION TIME (DT): 111.7 MS
ECHO MV E VELOCITY: 0.67 M/S
ECHO MV E/A RATIO: 1.72
ECHO MV E/E' LATERAL: 9.57
ECHO MV E/E' RATIO (AVERAGED): 13.16
ECHO MV E/E' SEPTAL: 16.75
ECHO RA AREA 4C: 23.4 CM2
ECHO RA END SYSTOLIC VOLUME APICAL 4 CHAMBER INDEX BSA: 37 ML/M2
ECHO RA VOLUME AREA LENGTH APICAL 4 CHAMBER: 74 ML
ECHO RA VOLUME: 74 ML
ECHO RIGHT VENTRICULAR SYSTOLIC PRESSURE (RVSP): 32 MMHG
ECHO RV FREE WALL PEAK S': 9 CM/S
ECHO RV INTERNAL DIMENSION: 4 CM
ECHO RV TAPSE: 1.8 CM (ref 1.7–?)
ECHO TV REGURGITANT MAX VELOCITY: 2.69 M/S
ECHO TV REGURGITANT PEAK GRADIENT: 29 MMHG

## 2023-01-11 NOTE — PROGRESS NOTES
low flow/low gradient severe aortic stenosis with low EF 25%-30%.   RV pacing   Atrial flutter  Mild to moderate AR    Patient needs to be seen for adjustment of pacemaker to reduce RV pacing and to discuss about the status of aortic valve, TAVR

## 2023-01-12 ENCOUNTER — TELEPHONE (OUTPATIENT)
Dept: CARDIOLOGY CLINIC | Age: 88
End: 2023-01-12

## 2023-01-12 NOTE — TELEPHONE ENCOUNTER
----- Message from Jose Vaca MD sent at 1/10/2023 11:34 PM EST -----  low flow/low gradient severe aortic stenosis with low EF 25%-30%.   RV pacing   Atrial flutter  Mild to moderate AR    Patient needs to be seen for adjustment of pacemaker to reduce RV pacing and to discuss about the status of aortic valve, TAVR

## 2023-01-12 NOTE — TELEPHONE ENCOUNTER
Verified patient with two types of identifiers. Notified patient of results and MD recommendations. Scheduled device and appointment with Dr. Darling Guerrero to discuss TAVR. Patient verbalized understanding and will call with any other questions.       Future Appointments   Date Time Provider Floyd Robert   2/8/2023  1:20 PM MD LINNEA Graham BS AMB   3/13/2023 11:00 AM REMOTE1, TRIXIE RODGERS AMB   6/14/2023  7:30 AM REMOTE1, TRIXIE RODGERS AMB   9/7/2023 11:20 AM PACEMAKER3, TRIXIE RODGERS AMB   9/7/2023 11:40 AM MD LINNEA Anne BS AMB

## 2023-02-08 ENCOUNTER — OFFICE VISIT (OUTPATIENT)
Dept: CARDIOLOGY CLINIC | Age: 88
End: 2023-02-08
Payer: MEDICARE

## 2023-02-08 ENCOUNTER — DOCUMENTATION ONLY (OUTPATIENT)
Dept: CARDIOLOGY CLINIC | Age: 88
End: 2023-02-08

## 2023-02-08 ENCOUNTER — CLINICAL SUPPORT (OUTPATIENT)
Dept: CARDIOLOGY CLINIC | Age: 88
End: 2023-02-08
Payer: MEDICARE

## 2023-02-08 VITALS
WEIGHT: 184 LBS | SYSTOLIC BLOOD PRESSURE: 122 MMHG | HEIGHT: 70 IN | RESPIRATION RATE: 16 BRPM | OXYGEN SATURATION: 98 % | BODY MASS INDEX: 26.34 KG/M2 | DIASTOLIC BLOOD PRESSURE: 86 MMHG | HEART RATE: 59 BPM

## 2023-02-08 DIAGNOSIS — I47.1 SVT (SUPRAVENTRICULAR TACHYCARDIA) (HCC): ICD-10-CM

## 2023-02-08 DIAGNOSIS — I35.0 NONRHEUMATIC AORTIC VALVE STENOSIS: Primary | ICD-10-CM

## 2023-02-08 DIAGNOSIS — I10 PRIMARY HYPERTENSION: ICD-10-CM

## 2023-02-08 DIAGNOSIS — I25.10 CORONARY ARTERY DISEASE DUE TO LIPID RICH PLAQUE: ICD-10-CM

## 2023-02-08 DIAGNOSIS — Z95.0 CARDIAC PACEMAKER IN SITU: Primary | ICD-10-CM

## 2023-02-08 DIAGNOSIS — I25.83 CORONARY ARTERY DISEASE DUE TO LIPID RICH PLAQUE: ICD-10-CM

## 2023-02-08 PROCEDURE — G8417 CALC BMI ABV UP PARAM F/U: HCPCS | Performed by: INTERNAL MEDICINE

## 2023-02-08 PROCEDURE — 1123F ACP DISCUSS/DSCN MKR DOCD: CPT | Performed by: INTERNAL MEDICINE

## 2023-02-08 PROCEDURE — 93010 ELECTROCARDIOGRAM REPORT: CPT | Performed by: INTERNAL MEDICINE

## 2023-02-08 PROCEDURE — 93005 ELECTROCARDIOGRAM TRACING: CPT | Performed by: INTERNAL MEDICINE

## 2023-02-08 PROCEDURE — 99205 OFFICE O/P NEW HI 60 MIN: CPT | Performed by: INTERNAL MEDICINE

## 2023-02-08 PROCEDURE — G8432 DEP SCR NOT DOC, RNG: HCPCS | Performed by: INTERNAL MEDICINE

## 2023-02-08 PROCEDURE — 1101F PT FALLS ASSESS-DOCD LE1/YR: CPT | Performed by: INTERNAL MEDICINE

## 2023-02-08 PROCEDURE — G0463 HOSPITAL OUTPT CLINIC VISIT: HCPCS | Performed by: INTERNAL MEDICINE

## 2023-02-08 PROCEDURE — G8427 DOCREV CUR MEDS BY ELIG CLIN: HCPCS | Performed by: INTERNAL MEDICINE

## 2023-02-08 PROCEDURE — G8536 NO DOC ELDER MAL SCRN: HCPCS | Performed by: INTERNAL MEDICINE

## 2023-02-08 NOTE — LETTER
2/8/2023    Patient: Carol Kline   YOB: 1933   Date of Visit: 2/8/2023     Pavel Calderon, [address] [place] [id number]  Via Fax: [phone]    Dear GUERO Malave,      Thank you for referring Mr. Marie Bermudez to [address] [place] [address] for evaluation. My notes for this consultation are attached. If you have questions, please do not hesitate to call me. I look forward to following your patient along with you.       Sincerely,    Andry Simmons MD

## 2023-02-08 NOTE — PROGRESS NOTES
Dr. Sharonda Polanco. [phone]            Cardiology structural heart disease consult/Progress Note      Requesting/referring provider: GUERO Daly, Dr. Maddie Stein    Reason for Consult: Aortic stenosis    Assessment/Plan:  1. 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  2.  Coronary disease, s/p coronary bypass grafting. 3.  Dual-chamber pacemaker placement for sinus node dysfunction  4. Hypertension  5. Hyperlipidemia  6. Aortic stenosis with concern regarding possible severe low-flow low gradient AAS  7. History of cardiomyopathy multiple factors including RV pacing and coronary artery disease    Mr. Dennis Rao is seen at the request of Dr. Maddie Stein. He has minimal shortness of breath. He does have aortic stenosis which clinically appears moderate. Most recent echocardiogram however suggested possibility of low-flow low gradient severe aortic stenosis. We could perform a dobutamine stress echocardiogram or alternatively invasive cardiac catheterization with dobutamine to further assess/distinguish between pseudo severe and severe aortic stenosis. Natural history of aortic stenosis was discussed with the patient as well as increased risk of mortality, hospitalization once he develops symptoms from AS. Alternatively given that he is asymptomatic at his age in the absence of any significant symptoms may be reasonable to continue close monitoring and follow-up ensuring that the LV function does not deteriorate further and he does not develop any new symptoms. He can continue his routine follow-up with Dr. Maddie Stein and I will be happy to see him back in 1 year.     Investigations ordered    []    High complexity decision making was performed  []    Patient is at high-risk of decompensation with multiple organ involvement  []    Complex/difficult social determinants of health outcomes  Total of ** minutes were spent on reviewing the records, analyzing investigations and documentation in the chart, on the day of visit including time for examination and time spent with the patient  Investigations personally reviewed and interpreted  Echocardiogram from January 2023 was personally reviewed. It shows evidence of moderate calcification on the aortic valve with a combination of aortic insufficiency which is mild and aortic stenosis which appears at least moderate. Peak transfer to velocity is about 3 m/s with LVOT to aortic VTI ratio about 0.25. However LVOT VTI is extremely low. Uncertain if it is under calculated on the echocardiogram and could falsely cause appearance of more severe AS than it actually is. Calculated aortic valve area was about 1 cm². Investigations reviewed    HPI: Joni Clay, a 80y.o. year-old who is seen for evaluation of shortness of breath and management of aortic stenosis. He  has a past medical history of Arthritis, Hypercholesterolemia, Hypertension, and SVT (supraventricular tachycardia) ([facility] Review of system:Patient reports no dyspnea/PND/Orthpnea/CP. He reports no cough/fever/focal neurological deficits/abdominal pain. All other systems negative except as above. No family history on file. Social History     Socioeconomic History    Marital status:    Tobacco Use    Smoking status: Never    Smokeless tobacco: Never   Substance and Sexual Activity    Alcohol use: No    Drug use: No    Sexual activity: Yes     Partners: Female      PE  Vitals:    02/08/23 1310   Resp: 16   Height: 5' 10\" (1.778 m)    Body mass index is 26.54 kg/m². General:    Alert, cooperative, no distress. Psychiatric:    Normal Mood and affect    Eye/ENT:      Pupils equal, No asymmetry, Conjunctival pink. Able to hear voice at normal amplitude   Lungs:      Visibly symmetric chest expansion, No palpable tenderness. Clear to auscultation bilaterally.     Heart[de-identified]    Regular rate and rhythm, S1 normal, S2 attenuated but audible. Mid peaking midsystolic murmur, click, rub or gallop. No JVD, Normal palpable peripheral pulses. No cyanosis   Abdomen:     Soft, non-tender. Bowel sounds normal. No masses,  No      organomegaly. Extremities:   Extremities normal, atraumatic, no edema. Neurologic:   CN II-XII grossly intact. No gross focal deficits           Recent Labs:  No results found for: CHOL, CHOLX, CHLST, CHOLV, 413372, HDL, HDLP, LDL, LDLC, DLDLP, TGLX, TRIGL, TRIGP, CHHD, [facility]  Lab Results   Component Value Date/Time    Creatinine 1.10 02/21/2019 12:25 PM     Lab Results   Component Value Date/Time    BUN 18 02/21/2019 12:25 PM     Lab Results   Component Value Date/Time    Potassium 5.1 02/21/2019 12:25 PM     Lab Results   Component Value Date/Time    Hemoglobin A1c 5.5 11/23/2010 03:25 PM     Lab Results   Component Value Date/Time    HGB 15.2 02/21/2019 12:25 PM     Lab Results   Component Value Date/Time    PLATELET 470 82/00/8891 12:25 PM       Reviewed:  Past Medical History:   Diagnosis Date    Arthritis     Hypercholesterolemia     Hypertension     SVT (supraventricular tachycardia) ([facility])      Social History     Tobacco Use   Smoking Status Never   Smokeless Tobacco Never     Social History     Substance and Sexual Activity   Alcohol Use No     Allergies   Allergen Reactions    Atorvastatin Rash and Itching    Oxycodone Rash    Oxycodone-Acetaminophen Rash     No family history on file. Current Outpatient Medications   Medication Sig    metoprolol succinate (TOPROL-XL) 50 mg XL tablet Take 1 Tab by mouth daily. losartan (COZAAR) 25 mg tablet Take 1 Tab by mouth daily. Xarelto 20 mg tab tablet Take 1 tablet by mouth once daily    dorzolamide (TRUSOPT) 2 % ophthalmic solution INSTILL 1 DROP  2 TIMES EVERY DAY INTO RIGHT EYE    brimonidine (ALPHAGAN) 0.2 % ophthalmic solution Administer 1 Drop to right eye two (2) times a day.     latanoprost (XALATAN) 0.005 % ophthalmic solution Administer 1 Drop to right eye nightly. omeprazole (PRILOSEC) 20 mg capsule Take 20 mg by mouth daily. rosuvastatin (CRESTOR) 10 mg tablet Take 10 mg by mouth daily. VIT A,C & E/NIAC/B2/LUT/MN/GLU (EYE-MARYA PO) Take  by mouth. No current facility-administered medications for this visit. ATTENTION:   This medical record was transcribed using an electronic medical records/speech recognition system. Although proofread, it may and can contain electronic, spelling and other errors. Corrections may be executed at a later time. Please feel free to contact us for any clarifications as needed.     [employer] [facility] [place]  [employer],  [address] [facility], [address] [facility]. [phone]

## 2023-02-08 NOTE — PROGRESS NOTES
[facility] called me that his atrial flutter has slow V rate 45-55 bpm  He is going to be v pacing   Need TAVR and BIV pacer upgrade (no ICD as his LVEF may be improved further, and he is 79 yo)

## 2023-02-15 ENCOUNTER — DOCUMENTATION ONLY (OUTPATIENT)
Dept: CARDIOLOGY CLINIC | Age: 88
End: 2023-02-15

## 2023-02-15 ENCOUNTER — TELEPHONE (OUTPATIENT)
Dept: CARDIOLOGY CLINIC | Age: 88
End: 2023-02-15

## 2023-02-15 NOTE — TELEPHONE ENCOUNTER
Verified patient with two types of identifiers. Spoke with patient's wife verified of PHI. Notified of MD recommendations. Scheduled follow up to discuss further and decide BiV PPM vs. BiV ICD. Patient's wife verbalized understanding and will call with any other questions.       Future Appointments   Date Time Provider Floyd Robert   3/3/2023  2:00 PM MD DU Velazquez BS AMB   5/30/2023 10:30 AM [address] DU BS AMB   9/13/2023  9:40 AM PACEMAKER, VALENCIA RODGERS AMB   9/13/2023 10:00 AM MD DU Velazquez AMB   12/1/2023 12:30 PM ECHOTWOJEANNE AMB   12/1/2023  1:40 PM MD DU Wright BS AMB

## 2023-03-03 ENCOUNTER — OFFICE VISIT (OUTPATIENT)
Dept: CARDIOLOGY CLINIC | Age: 88
End: 2023-03-03
Payer: MEDICARE

## 2023-03-03 VITALS
HEART RATE: 69 BPM | WEIGHT: 184 LBS | HEIGHT: 70 IN | RESPIRATION RATE: 16 BRPM | SYSTOLIC BLOOD PRESSURE: 130 MMHG | OXYGEN SATURATION: 92 % | DIASTOLIC BLOOD PRESSURE: 70 MMHG | BODY MASS INDEX: 26.34 KG/M2

## 2023-03-03 DIAGNOSIS — Z86.79 S/P CATHETER ABLATION OF SLOW PATHWAY: ICD-10-CM

## 2023-03-03 DIAGNOSIS — R06.09 DOE (DYSPNEA ON EXERTION): ICD-10-CM

## 2023-03-03 DIAGNOSIS — I42.0 DILATED CARDIOMYOPATHY (HCC): Primary | ICD-10-CM

## 2023-03-03 DIAGNOSIS — Z98.890 STATUS POST CATHETER ABLATION OF ATRIAL FLUTTER: ICD-10-CM

## 2023-03-03 DIAGNOSIS — I48.19 PERSISTENT ATRIAL FIBRILLATION (HCC): ICD-10-CM

## 2023-03-03 DIAGNOSIS — Z95.0 CARDIAC PACEMAKER IN SITU: ICD-10-CM

## 2023-03-03 DIAGNOSIS — I10 ESSENTIAL HYPERTENSION: ICD-10-CM

## 2023-03-03 DIAGNOSIS — Z98.890 S/P CATHETER ABLATION OF SLOW PATHWAY: ICD-10-CM

## 2023-03-03 DIAGNOSIS — I44.7 LBBB (LEFT BUNDLE BRANCH BLOCK): ICD-10-CM

## 2023-03-03 DIAGNOSIS — I35.0 NONRHEUMATIC AORTIC VALVE STENOSIS: ICD-10-CM

## 2023-03-03 PROCEDURE — G8427 DOCREV CUR MEDS BY ELIG CLIN: HCPCS | Performed by: INTERNAL MEDICINE

## 2023-03-03 PROCEDURE — G0463 HOSPITAL OUTPT CLINIC VISIT: HCPCS | Performed by: INTERNAL MEDICINE

## 2023-03-03 PROCEDURE — 1123F ACP DISCUSS/DSCN MKR DOCD: CPT | Performed by: INTERNAL MEDICINE

## 2023-03-03 PROCEDURE — G8536 NO DOC ELDER MAL SCRN: HCPCS | Performed by: INTERNAL MEDICINE

## 2023-03-03 PROCEDURE — 1101F PT FALLS ASSESS-DOCD LE1/YR: CPT | Performed by: INTERNAL MEDICINE

## 2023-03-03 PROCEDURE — G8510 SCR DEP NEG, NO PLAN REQD: HCPCS | Performed by: INTERNAL MEDICINE

## 2023-03-03 PROCEDURE — 99214 OFFICE O/P EST MOD 30 MIN: CPT | Performed by: INTERNAL MEDICINE

## 2023-03-03 PROCEDURE — G8417 CALC BMI ABV UP PARAM F/U: HCPCS | Performed by: INTERNAL MEDICINE

## 2023-03-03 NOTE — PATIENT INSTRUCTIONS
Your biventricular pacemaker upgrade procedure has been scheduled for 5/11/23 at 1100 am, at [facility].    Please report to Admitting Department by 900 am, or 2 hours prior to your scheduled procedure. Please bring a list of your current medications and medication bottles, if able, to the hospital on this day. You will be unable to drive after your procedure so please make sure to bring someone with you to your procedure. You will need to have nothing to eat or drink after midnight, the night prior to your procedure. You may have small sips of water, if needed, to take with your medication. You will also need to see Dr. Praful Yoon Nurse Practitioner, Fay Rivas, in office prior to your procedure. An appointment has been scheduled for 4/17/23 at 940 am.    You will need labs drawn prior to your procedure. Please go to have this done after your appointment with Jeri Joel NP. You should stop your medication, Xarelto, 2 days prior to your scheduled procedure. After your procedure, you will need to follow up with Dr. Praful Yoon nurse for a wound and device check. Your follow-up appointment has been scheduled for 5/25/23 at 1020 am.     Hibiclens 4% topical solution has been ordered and sent into your pharmacy  Patient it start Hibiclens application 5 days prior to procedure date    Directions Hibiclens 4%: Start cleanse 5 days prior to procedure   1. Rinse area (upper chest and upper arms) with water. 2. Apply minimum amount necessary to scrub the upper chest area from shoulder/neck to mid line of chest and to below the nipple each of  5 nights before the day of the procedure  3. Let solution dry.

## 2023-03-03 NOTE — PROGRESS NOTES
Cardiac Electrophysiology OFFICE VISIT Note         Subjective: Zahira Nunez is an 80 y.o. patient who presents for follow up of dilated cardiomyopathy  + earlier WHITNEY and fatigue  Not considered ready yet for TAVR after meeting with Dr Jacky Ghotra 01/10/2023 1/10/2023    Interpretation Summary    Left Ventricle: Severely reduced left ventricular systolic function with a visually estimated EF of 25 - 30%. EF by 2D Simpsons Biplane is 25%. Left ventricle size is normal. Mildly increased wall thickness. Findings consistent with concentric remodeling. Moderate global hypokinesis present. Abnormal diastolic function. Aortic Valve: Not well visualized. Severely calcified cusp. Mild to moderate regurgitation. Mitral Valve: Mildly thickened leaflet. Mild regurgitation. Tricuspid Valve: Mild regurgitation. Left Atrium: Left atrium is severely dilated. Technical qualifiers: Echo study was technically difficult with poor endocardial visualization. low flow/low gradient severe aortic stenosis with low EF. Signed by: Francesca Carranza MD on 1/10/2023 11:30 PM    He was seen by Dr Katina Waite and he has WHITNEY, running out of breaths quicker when he works around his house    Dr Katina Waite thinks his AS is not severe yet    He was scheduled for AF ablation on 10/28/2022 but changed his mind. AT/AF burden via pacemaker was >99%. When [employer] rep checked his device, cannot reduce RV pacing  ms because his V rate was < 55 bpm    States he does not have syncope     BP controlled. Anticoagulated with Xarelto, had previous epistaxis but nothing recently. 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 [employer] dual chamber pacemaker (DOI 01/04/2019). He does not see Dr. Tara Patterson any more. S/p CABG.          Problem List  Date Reviewed: 9/8/2022            Codes Class Noted    Coronary artery disease due to lipid rich plaque ICD-10-CM: I25.10, I25.83  ICD-9-CM: 414.00, 414.3  11/15/2022        Status post catheter ablation of slow pathway ICD-10-CM: Z98.890, Z86.79  ICD-9-CM: V45.89  3/13/2019    Overview Signed 3/13/2019 11:26 AM by Mac Best MD     3/13/2019             Status post catheter ablation of atrial flutter ICD-10-CM: Z98.890  ICD-9-CM: V45.89  3/13/2019    Overview Signed 3/13/2019 11:27 AM by Mac Best MD     3/13/2019             AVNRT (AV juliano re-entry tachycardia) ([address] [facility] ICD-10-CM: I47.1  ICD-9-CM: 427.89  3/13/2019        Pacemaker ICD-10-CM: Z95.0  ICD-9-CM: V45.01  1/3/2019    Overview Signed 1/3/2019  4:13 PM by Mac Best MD     [employer] pacer dual chamber 1/3/2019             Arthritis ICD-10-CM: M19.90  ICD-9-CM: 716.90  Unknown        Hypercholesterolemia ICD-10-CM: E78.00  ICD-9-CM: 272.0  Unknown        Hypertension ICD-10-CM: I10  ICD-9-CM: 401.9  Unknown        SVT (supraventricular tachycardia) ([address] [facility] ICD-10-CM: I47.1  ICD-9-CM: 427.89  Unknown        Palpitations ICD-10-CM: R00.2  ICD-9-CM: 785.1  12/31/2018        Esophageal reflux ICD-10-CM: K21.9  ICD-9-CM: 530.81  12/4/2011              Current Outpatient Medications on File Prior to Visit   Medication Sig Dispense Refill    metoprolol succinate (TOPROL-XL) 50 mg XL tablet Take 1 Tab by mouth daily. 90 Tab 1    Xarelto 20 mg tab tablet Take 1 tablet by mouth once daily 30 Tab 0    dorzolamide (TRUSOPT) 2 % ophthalmic solution INSTILL 1 DROP  2 TIMES EVERY DAY INTO RIGHT EYE      brimonidine (ALPHAGAN) 0.2 % ophthalmic solution Administer 1 Drop to right eye two (2) times a day. latanoprost (XALATAN) 0.005 % ophthalmic solution Administer 1 Drop to right eye nightly. omeprazole (PRILOSEC) 20 mg capsule Take 20 mg by mouth daily. rosuvastatin (CRESTOR) 10 mg tablet Take 5 mg by mouth daily. VIT A,C & E/NIAC/B2/LUT/MN/GLU (EYE-MARYA PO) Take  by mouth.      losartan (COZAAR) 25 mg tablet Take 1 Tab by mouth daily. (Patient not taking: Reported on 3/3/2023) 90 Tab 1     No current facility-administered medications on file prior to visit. Allergies   Allergen Reactions   · Lipitor [Atorvastatin] Rash and Itching   · Oxycodone Rash   · Percocet [Oxycodone-Acetaminophen] Rash     Past Medical History:   Diagnosis Date   · Arthritis   · Hypercholesterolemia   · Hypertension   · SVT (supraventricular tachycardia) (HCC)     Past Surgical History:   Procedure Laterality Date   · HX ORTHOPAEDIC 2/10   L shoulder replacement   · IN COMPRE EP EVAL ABLTJ 3D MAPG TX SVT N/A 3/13/2019   ABLATION A-FLUTTER performed by Aleks Liriano MD at [address], [facility] [address] CATH LAB   ·  [address] ADD ON N/A 3/13/2019   Ablation Svt/Vt Add On performed by Aleks Liriano MD at [address] [place] [address], [facility] [address] CATH LAB   · IN INS NEW/RPLCMT PRM PM W/TRANSV ELTRD ATRIAL&VENT 1/3/2019     · IN INTRACARDIAC ELECTROPHYSIOLOGIC 3D MAPPING N/A 3/13/2019   Ep 3d Mapping performed by Aleks Liriano MD at [address] [place] [address], [facility] CATH LAB       Social History     Tobacco Use   · Smoking status: Never   · Smokeless tobacco: Never   Substance Use Topics   · Alcohol use: No         Review of Systems: All other systems negative. Constitutional: Negative for fever, chills, weight loss   HEENT: Negative for vision changes. Respiratory: Negative for cough, hemoptysis. Cardiovascular: Negative for chest pain, no orthopnea, claudication, leg swelling, syncope, and PND. + dyspnea with activities  Gastrointestinal: Negative for nausea, vomiting, diarrhea, blood in stool and melena. Genitourinary: Negative for dysuria, and hematuria. Musculoskeletal: Negative for myalgias, arthralgia. Skin: No rash. Heme: Does not bleed or bruise easily. Neurological: Negative for speech change and focal weakness.        Objective:   Visit Vitals  /70   Pulse 69   Resp 16   Ht 5' 10\" (1.778 m)   Wt 184 lb (83.5 kg)   SpO2 92%   BMI 26.40 kg/m²      Constitutional: Well-developed and well-nourished. No distress. Head: Normocephalic and atraumatic. Eyes: Pupils are equal, round . Neck: Neck supple. No JVD present. Cardiovascular: Normal rate, regular rhythm and exam reveals no gallop and no friction rub. 3/6 RUSB systolically murmur heard, delayed peaking. Pulmonary/Chest: Effort normal and breath sounds normal. No wheezes. Abdominal: Soft, no rebound. Musculoskeletal: No edema and no tenderness. Neurological: Alert, oriented. Skin: unremarkable left chest pacemaker   Psychiatric: normal mood and affect. Behavior is normal. Judgment and thought content normal.     Assessment/Plan:     Imaging/Studies:   Holter (06/2022): HR  bpm, avg 70 bpm.  AF/AFL burden 100%. Single NSVT x 3 beats. Echo (11/05/2020): LVEF 40%. Mild AS. ICD-10-CM ICD-9-CM    1. Dilated cardiomyopathy (HCC)  I42.0 425.4       2. Cardiac pacemaker in situ  Z95.0 V45.01       3. WHITNEY (dyspnea on exertion)  R06.09 786.09       4. Nonrheumatic aortic valve stenosis  I35.0 424.1       5. S/P catheter ablation of slow pathway  Z98.890 V45.89     Z86.79        6. Essential hypertension  I10 401.9       7. Persistent atrial fibrillation (HCC)  I48.19 427.31         Persistent AF/AFL: at patient's wish, no ablation  He is RV pacing due to slow V rate and AV node conduction disease    [employer] dual chamber pacemaker (DOI 01/04/2019): Device check on 06/29/2022 showed proper lead & generator function. Generator longevity estimated 6 years, 4 months. RV pacing 100%  Cannot minimize  QRS LBBB 160 ms    NICM/AS: LVEF < 30%  NYHA II chronic systolic CHF. Continue GDMT. Plan to upgrade pacer to BIV pacer  No ICD with his age  He agrees to proceed  Risks involve device implant include but are not limited to bleeding, infection, valvular damage, heart attack, stroke, lung collapse (pneumothorax or hemothorax), heart collapse (pericardial tamponade), heart perforation, kidney failure, death. Elective or emergency surgery may be required to repair some of these complications. Prolonged hospitalization would be required. General anesthesia may be required for the procedure. CAD CABG- no angina      HTN: BP controlled. Continue current medication regimen. Anticoagulation: Continue Xarelto for embolic CVA prophylaxis. Denies bleeding issues. moderate AR/AS- not yet candidate for TAVR per Dr Nabila Goddard   Date Time Provider [place]   5/30/2023 10:30 AM [facility] CAVSF BS AMB   9/13/2023  9:40 AM PACEMAKER, STFRANCES CAVSF BS AMB   9/13/2023 10:00 AM Stan Bynum MD CAVSF BS AMB   12/1/2023 12:30 PM ECHOTWO, STANCSHAILESH CAVSF BS AMB   12/1/2023  1:40 PM Rebecca Babin MD CAVSF BS AMB         Thank you for involving me in this patient's care and please call with further concerns or questions. Gracy Skiff, M.D.    Electrophysiology/Cardiology   [facility] [place]   [address], [address] , [address]   [facility], [address]                             [address] [place] [address]   [phone]

## 2023-04-17 ENCOUNTER — TELEPHONE (OUTPATIENT)
Dept: CARDIOLOGY CLINIC | Age: 88
End: 2023-04-17

## 2023-04-17 RX ORDER — CHLORHEXIDINE GLUCONATE 4 G/100ML
1 SOLUTION TOPICAL
Qty: 118 ML | Refills: 0 | Status: SHIPPED | OUTPATIENT
Start: 2023-04-17 | End: 2023-04-17

## 2023-04-24 ENCOUNTER — DOCUMENTATION ONLY (OUTPATIENT)
Dept: CARDIOLOGY CLINIC | Age: 88
End: 2023-04-24

## 2023-04-24 NOTE — PROGRESS NOTES
Received labs dated 04/17/2023.     WBC 5.1  Hg 13.7  Hct 41.3  Plt 166    Na 140  K 4.6  Glu 76  Cr 1.43  BUN 25

## 2023-05-03 RX ORDER — SODIUM CHLORIDE 0.9 % (FLUSH) 0.9 %
5-40 SYRINGE (ML) INJECTION AS NEEDED
OUTPATIENT
Start: 2023-05-03

## 2023-05-03 RX ORDER — SODIUM CHLORIDE 0.9 % (FLUSH) 0.9 %
5-40 SYRINGE (ML) INJECTION EVERY 8 HOURS
OUTPATIENT
Start: 2023-05-03

## 2023-05-08 ENCOUNTER — TELEPHONE (OUTPATIENT)
Age: 88
End: 2023-05-08

## 2023-05-08 NOTE — TELEPHONE ENCOUNTER
Pt's wife would like instructions on how to use the wash to prep for the surgery, pt has surgery on 5/11/23.       Elba Lowery (wife)  [phone]

## 2023-05-08 NOTE — TELEPHONE ENCOUNTER
Verified patient with two types of identifiers. Spoke with patients wife verified on PHI. Reiterated how to use Hibiclens solution. They have not started to use yet. Advised to start today in preparation for procedure. Patient's wife verbalized understanding and will call with any other questions.       Future Appointments   Date Time Provider Petey Alatorre   5/25/2023 10:20 AM PACEMAKER, STFRANCES CAVSF BS AMB   9/13/2023  9:40 AM PACEMAKER, STFRANCES CAVSF BS AMB   9/13/2023 10:00 AM Hui Mcneill MD CAVSF BS AMB   12/1/2023 12:30 PM [facility] ECHO 2 CAVSF BS AMB   12/1/2023  1:40 PM Michell Katz MD CAVSF BS AMB

## 2023-05-11 ENCOUNTER — ANESTHESIA EVENT (OUTPATIENT)
Facility: HOSPITAL | Age: 88
End: 2023-05-11
Payer: MEDICARE

## 2023-05-11 ENCOUNTER — APPOINTMENT (OUTPATIENT)
Facility: HOSPITAL | Age: 88
End: 2023-05-11
Attending: INTERNAL MEDICINE
Payer: MEDICARE

## 2023-05-11 ENCOUNTER — ANESTHESIA (OUTPATIENT)
Facility: HOSPITAL | Age: 88
End: 2023-05-11
Payer: MEDICARE

## 2023-05-11 ENCOUNTER — HOSPITAL ENCOUNTER (OUTPATIENT)
Facility: HOSPITAL | Age: 88
Discharge: HOME | End: 2023-05-11
Attending: INTERNAL MEDICINE | Admitting: INTERNAL MEDICINE
Payer: MEDICARE

## 2023-05-11 VITALS
HEART RATE: 71 BPM | DIASTOLIC BLOOD PRESSURE: 83 MMHG | RESPIRATION RATE: 20 BRPM | SYSTOLIC BLOOD PRESSURE: 161 MMHG | OXYGEN SATURATION: 95 % | TEMPERATURE: 97.6 F

## 2023-05-11 DIAGNOSIS — I42.0 CONGESTIVE CARDIOMYOPATHY (HCC): ICD-10-CM

## 2023-05-11 PROBLEM — I50.22 CHRONIC SYSTOLIC CHF (CONGESTIVE HEART FAILURE), NYHA CLASS 2 (HCC): Status: ACTIVE | Noted: 2023-05-11

## 2023-05-11 LAB
ANION GAP SERPL CALC-SCNC: 1 MMOL/L (ref 5–15)
BUN SERPL-MCNC: 22 MG/DL (ref 6–20)
BUN/CREAT SERPL: 17 (ref 12–20)
CALCIUM SERPL-MCNC: 9 MG/DL (ref 8.5–10.1)
CHLORIDE SERPL-SCNC: 108 MMOL/L (ref 97–108)
CO2 SERPL-SCNC: 28 MMOL/L (ref 21–32)
CREAT SERPL-MCNC: 1.31 MG/DL (ref 0.7–1.3)
ERYTHROCYTE [DISTWIDTH] IN BLOOD BY AUTOMATED COUNT: 13.7 % (ref 11.5–14.5)
GLUCOSE SERPL-MCNC: 101 MG/DL (ref 65–100)
HCT VFR BLD AUTO: 46.9 % (ref 36.6–50.3)
HGB BLD-MCNC: 14.9 G/DL (ref 12.1–17)
MCH RBC QN AUTO: 29.6 PG (ref 26–34)
MCHC RBC AUTO-ENTMCNC: 31.8 G/DL (ref 30–36.5)
MCV RBC AUTO: 93.2 FL (ref 80–99)
NRBC # BLD: 0 K/UL (ref 0–0.01)
NRBC BLD-RTO: 0 PER 100 WBC
PLATELET # BLD AUTO: 172 K/UL (ref 150–400)
PMV BLD AUTO: 11.9 FL (ref 8.9–12.9)
POTASSIUM SERPL-SCNC: 4.5 MMOL/L (ref 3.5–5.1)
RBC # BLD AUTO: 5.03 M/UL (ref 4.1–5.7)
SODIUM SERPL-SCNC: 137 MMOL/L (ref 136–145)
WBC # BLD AUTO: 6.1 K/UL (ref 4.1–11.1)

## 2023-05-11 PROCEDURE — 2720000010 HC SURG SUPPLY STERILE: Performed by: INTERNAL MEDICINE

## 2023-05-11 PROCEDURE — 3700000000 HC ANESTHESIA ATTENDED CARE: Performed by: INTERNAL MEDICINE

## 2023-05-11 PROCEDURE — 71045 X-RAY EXAM CHEST 1 VIEW: CPT

## 2023-05-11 PROCEDURE — 2709999900 HC NON-CHARGEABLE SUPPLY: Performed by: INTERNAL MEDICINE

## 2023-05-11 PROCEDURE — C1889 IMPLANT/INSERT DEVICE, NOC: HCPCS | Performed by: INTERNAL MEDICINE

## 2023-05-11 PROCEDURE — C1900 LEAD, CORONARY VENOUS: HCPCS | Performed by: INTERNAL MEDICINE

## 2023-05-11 PROCEDURE — C1887 CATHETER, GUIDING: HCPCS | Performed by: INTERNAL MEDICINE

## 2023-05-11 PROCEDURE — 2500000003 HC RX 250 WO HCPCS: Performed by: INTERNAL MEDICINE

## 2023-05-11 PROCEDURE — 33225 L VENTRIC PACING LEAD ADD-ON: CPT | Performed by: INTERNAL MEDICINE

## 2023-05-11 PROCEDURE — 2580000003 HC RX 258: Performed by: NURSE ANESTHETIST, CERTIFIED REGISTERED

## 2023-05-11 PROCEDURE — C1894 INTRO/SHEATH, NON-LASER: HCPCS | Performed by: INTERNAL MEDICINE

## 2023-05-11 PROCEDURE — 6360000002 HC RX W HCPCS: Performed by: NURSE ANESTHETIST, CERTIFIED REGISTERED

## 2023-05-11 PROCEDURE — 85027 COMPLETE CBC AUTOMATED: CPT

## 2023-05-11 PROCEDURE — 80048 BASIC METABOLIC PNL TOTAL CA: CPT

## 2023-05-11 PROCEDURE — 3700000001 HC ADD 15 MINUTES (ANESTHESIA): Performed by: INTERNAL MEDICINE

## 2023-05-11 PROCEDURE — 36415 COLL VENOUS BLD VENIPUNCTURE: CPT

## 2023-05-11 PROCEDURE — C1769 GUIDE WIRE: HCPCS | Performed by: INTERNAL MEDICINE

## 2023-05-11 PROCEDURE — C2621 PMKR, OTHER THAN SING/DUAL: HCPCS | Performed by: INTERNAL MEDICINE

## 2023-05-11 PROCEDURE — 2500000003 HC RX 250 WO HCPCS: Performed by: NURSE ANESTHETIST, CERTIFIED REGISTERED

## 2023-05-11 PROCEDURE — 33229 REMV&REPLC PM GEN MULT LEADS: CPT | Performed by: INTERNAL MEDICINE

## 2023-05-11 PROCEDURE — C1730 CATH, EP, 19 OR FEW ELECT: HCPCS | Performed by: INTERNAL MEDICINE

## 2023-05-11 DEVICE — LEFT HEART LEAD
Type: IMPLANTABLE DEVICE | Status: FUNCTIONAL
Brand: QUARTET™

## 2023-05-11 DEVICE — CRT CARDIAC RESYNCHRONIZATION THERAPY PACEMAKER DDDRV
Type: IMPLANTABLE DEVICE | Status: FUNCTIONAL
Brand: QUADRA ALLURE MP™

## 2023-05-11 DEVICE — ENVELOPE CMRM6133 ABSORB LRG MR
Type: IMPLANTABLE DEVICE | Status: FUNCTIONAL
Brand: TYRX™

## 2023-05-11 RX ORDER — SODIUM CHLORIDE 9 MG/ML
INJECTION, SOLUTION INTRAVENOUS CONTINUOUS
Status: DISCONTINUED | OUTPATIENT
Start: 2023-05-11 | End: 2023-05-11 | Stop reason: HOSPADM

## 2023-05-11 RX ORDER — WATER 1000 ML/1000ML
INJECTION, SOLUTION INTRAVENOUS PRN
Status: DISCONTINUED | OUTPATIENT
Start: 2023-05-11 | End: 2023-05-11 | Stop reason: SDUPTHER

## 2023-05-11 RX ORDER — 0.9 % SODIUM CHLORIDE 0.9 %
INTRAVENOUS SOLUTION INTRAVENOUS PRN
Status: DISCONTINUED | OUTPATIENT
Start: 2023-05-11 | End: 2023-05-11 | Stop reason: SDUPTHER

## 2023-05-11 RX ORDER — CEFAZOLIN SODIUM 1 G/3ML
INJECTION, POWDER, FOR SOLUTION INTRAMUSCULAR; INTRAVENOUS PRN
Status: DISCONTINUED | OUTPATIENT
Start: 2023-05-11 | End: 2023-05-11 | Stop reason: SDUPTHER

## 2023-05-11 RX ORDER — PHENYLEPHRINE HCL IN 0.9% NACL 0.4MG/10ML
SYRINGE (ML) INTRAVENOUS PRN
Status: DISCONTINUED | OUTPATIENT
Start: 2023-05-11 | End: 2023-05-11 | Stop reason: SDUPTHER

## 2023-05-11 RX ORDER — LIDOCAINE HYDROCHLORIDE 10 MG/ML
INJECTION, SOLUTION EPIDURAL; INFILTRATION; INTRACAUDAL; PERINEURAL PRN
Status: DISCONTINUED | OUTPATIENT
Start: 2023-05-11 | End: 2023-05-11 | Stop reason: HOSPADM

## 2023-05-11 RX ORDER — ONDANSETRON 2 MG/ML
4 INJECTION INTRAMUSCULAR; INTRAVENOUS EVERY 6 HOURS PRN
Status: DISCONTINUED | OUTPATIENT
Start: 2023-05-11 | End: 2023-05-11 | Stop reason: HOSPADM

## 2023-05-11 RX ORDER — SODIUM CHLORIDE 0.9 % (FLUSH) 0.9 %
5-40 SYRINGE (ML) INJECTION PRN
Status: DISCONTINUED | OUTPATIENT
Start: 2023-05-11 | End: 2023-05-11 | Stop reason: HOSPADM

## 2023-05-11 RX ADMIN — PROPOFOL 55 MCG/KG/MIN: 10 INJECTION, EMULSION INTRAVENOUS at 11:33

## 2023-05-11 RX ADMIN — SODIUM CHLORIDE 100 ML: 9 INJECTION, SOLUTION INTRAVENOUS at 12:26

## 2023-05-11 RX ADMIN — Medication 40 MCG: at 12:35

## 2023-05-11 RX ADMIN — WATER 10 ML: 1 INJECTION INTRAMUSCULAR; INTRAVENOUS; SUBCUTANEOUS at 11:56

## 2023-05-11 RX ADMIN — Medication 80 MCG: at 12:43

## 2023-05-11 RX ADMIN — PROPOFOL 20 MG: 10 INJECTION, EMULSION INTRAVENOUS at 11:28

## 2023-05-11 RX ADMIN — CEFAZOLIN 2 G: 330 INJECTION, POWDER, FOR SOLUTION INTRAMUSCULAR; INTRAVENOUS at 11:55

## 2023-05-11 NOTE — PROGRESS NOTES
Ambulated patient to the bathroom with a steady gait with standby assist, voided without difficulty. Returned to stretcher. Vital signs stable. Site is clean, dry, and intact. No bleeding, no hematoma. Wife assisted patient in dressing    Reviewed discharge instructions using teach back method. Answered all questions. Verbalized understanding. Removed peripheral IV    Escorted to discharge area in a wheelchair with all of their belongings including  clothing, sling, pacemaker card and manual, glasses    Spouse present to take patient home. Reviewed discharge instructions with Wife and daughters. Verbalized understanding.

## 2023-05-11 NOTE — PROGRESS NOTES
Cardiac Cath Lab Procedure Area Arrival Note:    Diana Carter arrived to Cardiac Cath Lab, Procedure Area. Patient identifiers verified with NAME and DATE OF BIRTH. Procedure verified with patient. Consent forms verified. Allergies verified. Patient informed of procedure and plan of care. Questions answered with review. Patient voiced understanding of procedure and plan of care. P Patient status doing well without problems. Patient is A&Ox 4. Patient reports no complaints of pain. Anesthesia provider to administer medication and monitor vital signs. Please see anesthesia record for details.

## 2023-05-11 NOTE — H&P
[facility] Cardiology  Cardiac Electrophysiology H&P Note                      Patient Name: Theresa Mcintyre - PQB:[birth year] - BWY:[id number]  Primary Cardiologist: Charity Sarabia MD  Electrophysiologist: Jacki Bello MD     Reason for visit: Biventricular pacemaker upgrade      HPI:  Mr. Roseanna Zhou is a 80 y.o. male who presents for planned upgrade to biventricular pacemaker. He currently has a [employer] dual chamber pacemaker (DOI 01/04/2019). Due to his age, he opted against ICD implant. He reports LADD, running out of breath doing household chores. Dilated CM with AS (not yet severe per Dr. Sawyer Coates). NYHA 25-30% per echo in 01/2023. NYHA II-III chronic systolic CHF. BP controlled. Anticoagulated with Xarelto, had previous epistaxis but nothing recently. Not taking ASA for CAD & CABG. Previous:  Scheduled for AF ablation 10/28/2022, but changed his mind. AT/AF burden via pacemaker >99%. 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 S/p [employer] dual chamber pacemaker (DOI 01/04/2019). When [employer] rep checked his device, cannot reduce RV pacing  ms because V rate was <55 bpm.    No longer sees Dr. Anastasiia Diaz. S/p CABG. Assessment and Plan     Dilated CM: LVEF 25-30% per echo in 01/2023 with mild to moderate AS. NYHA II-III chronic systolic CHF. Continue GDMT. Reviewed risks/benefits of upgrade to biventricular pacemaker. Opted against ICD due to age. He agrees to proceed with pacemaker upgrade as scheduled. [employer] dual chamber pacemaker (DOI 01/04/2019): Device check on 12/12/2022 showed proper lead & generator function. Generator longevity estimated 6 yrs, 4 mos. RA 1%, RV 96%. Planning biventricular pacemaker upgrade as noted above. Persistent AF/AFL: No ablation per patient's preference. RV pacing due to slow ventricular rate & AV node conduction disease. AS: Mild to moderate per echo in 01/2023.   Managed by

## 2023-05-11 NOTE — ANESTHESIA PRE PROCEDURE
Department of Anesthesiology  Preprocedure Note       Name:  Argelia Son   Age:  80 y.o.  :  1933                                          MRN:  [id number]         Date:  2023      Surgeon: Yamini Fontaine):  Lara Rae MD    Procedure: Procedure(s):  Remove & replace ICD biv multi leads  Lv lead placement    Medications prior to admission:   Prior to Admission medications    Medication Sig Start Date End Date Taking? Authorizing Provider   Multiple Vitamins-Minerals (PRESERVISION AREDS PO) Take by mouth 2 times daily   Yes Historical Provider, MD   brimonidine (ALPHAGAN) 0.2 % ophthalmic solution Apply 1 drop to eye 2 times daily    Ar Automatic Reconciliation   dorzolamide (TRUSOPT) 2 % ophthalmic solution INSTILL 1 DROP  2 TIMES EVERY DAY INTO RIGHT EYE 20   Ar Automatic Reconciliation   latanoprost (XALATAN) 0.005 % ophthalmic solution Apply 1 drop to eye    Ar Automatic Reconciliation   losartan (COZAAR) 25 MG tablet Take 25 mg by mouth daily 3/25/21   Ar Automatic Reconciliation   metoprolol succinate (TOPROL XL) 50 MG extended release tablet Take 1 tablet by mouth daily 3/29/21   Ar Automatic Reconciliation   omeprazole (PRILOSEC) 20 MG delayed release capsule Take 1 capsule by mouth daily    Ar Automatic Reconciliation   rivaroxaban (XARELTO) 20 MG TABS tablet Take 1 tablet by mouth once daily 20   Ar Automatic Reconciliation   rosuvastatin (CRESTOR) 10 MG tablet Take 0.5 tablets by mouth daily    Ar Automatic Reconciliation       Current medications:    Current Facility-Administered Medications   Medication Dose Route Frequency Provider Last Rate Last Admin    0.9 % sodium chloride infusion   IntraVENous Continuous Lara Rae MD        ondansetron [facility]) injection 4 mg  4 mg IntraVENous Q6H PRN BRENNAN Gonsalves NP           Allergies:     Allergies   Allergen Reactions    Atorvastatin Itching and Rash    Oxycodone Rash    Oxycodone-Acetaminophen Rash       Problem
auscultation                             Cardiovascular:Negative CV ROS  Exercise tolerance: poor (<4 METS),   (+) pacemaker: pacemaker, CAD:,         Rhythm: regular  Rate: normal                    Neuro/Psych:   Negative Neuro/Psych ROS              GI/Hepatic/Renal: Neg GI/Hepatic/Renal ROS  (+) GERD:,           Endo/Other: Negative Endo/Other ROS                    Abdominal: normal exam            Vascular: negative vascular ROS. Other Findings:           Anesthesia Plan      MAC     ASA 3       Induction: intravenous. MIPS: Postoperative opioids intended and Prophylactic antiemetics administered. Anesthetic plan and risks discussed with patient. Use of blood products discussed with patient whom consented to blood products.    Plan discussed with CRNA and surgical team.    Attending anesthesiologist reviewed and agrees with Preprocedure content                Aidan Álvarez MD   5/11/2023

## 2023-05-11 NOTE — DISCHARGE INSTRUCTIONS
Patient Instructions Post-Pacemaker Upgrade      1. You may shower with your Aquacel chest dressing in place. 2.  You may remove your Aquacel chest dressing in 1 week, or it can be removed in clinic at follow up if you prefer. 3.  Do not rub/massage the site. 4.  Do not drive for 3 days. 5.  Do not raise affected arm above shoulder level & avoid pushing/pulling with affected arm. You may use the sling as provided as a reminder to limit range of motion, but please do not completely immobilize your arm. No lifting anything >5 lbs with affected arm. 6.  Call Dr. Alia Jay at [phone] if you experience any of the following symptoms:  Redness at the PM site  Swelling at or around the PM or in the left arm  Pain around the PM  Dizziness, lightheadedness, fainting spells  Lack of energy  Shortness of breath  Rapid heart rate  Chest or muscle twitches    7. Follow-up with in clinic for wound & pacemaker function check on 05/25/2023 at [facility] office. Future Appointments   Date Time Provider Petey Alatorre   5/25/2023 10:20 AM PACEMAKER, STFRANCES CAVSF BS AMB   9/13/2023  9:40 AM PACEMAKER, STFRANCES CAVSF BS AMB   9/13/2023 10:00 AM Emil Irene MD CAVSF BS AMB   12/1/2023 12:30 PM [facility] ECHO 2 CAVSF BS AMB   12/1/2023  1:40 PM Robert Ravi MD CAVSF BS Dileep Bartlett M.D.  [facility]  Electrophysiology/Cardiology  [facility] Cardiology  [address], [place] [address], [place] [address]  [address]  [phone]

## 2023-05-11 NOTE — PROGRESS NOTES
Cardiac Cath Lab Arrival Note:      Gladys Garrett arrived to Cardiac Cath Lab, Recovery Area. Staff introduced to patient. Patient identifiers verified with NAME and DATE OF BIRTH. Procedure verified with patient. Consent forms reviewed and signed by patient or authorized representative and verified. Allergies verified. Patient informed of procedure and plan of care. Questions answered with review. Patient prepped for procedure, per orders from physician, prior to arrival.    Patient on cardiac monitor, non-invasive blood pressure, SPO2 monitor. Patient is A&Ox 4. Patient reports no complaints. Patient in stretcher, in low position, with side rails up, call bell within reach, patient instructed to call of assistance as needed. Patient prep in: CCL Recovery Area, Bed# [address] 6. Family in: with patient/upstairs waiting room. Prep by: DECLAN Dejesus RN    Left chest hair clipped and painted with chlorhexidine/chlorprep.

## 2023-05-11 NOTE — ANESTHESIA POSTPROCEDURE EVALUATION
Department of Anesthesiology  Postprocedure Note    Patient: Qasim Gomez  MRN: [id number]  YOB: 1933  Date of evaluation: 5/11/2023      Procedure Summary     Date: 05/11/23 Room / Location: [facility] CATH LAB 2 / [facility] CARDIAC CATH LAB    Anesthesia Start: 2797 Anesthesia Stop: 5367    Procedures:       Remove & replace ICD biv multi leads      Lv lead placement Diagnosis:       Congestive cardiomyopathy ([address]      (Congestive cardiomyopathy ([address] [I42.0])    Providers: Ubaldo Barbour MD Responsible Provider: Adriana Engle MD    Anesthesia Type: MAC ASA Status: 3          Anesthesia Type: No value filed. Dorian Phase I: Dorian Score: 10    Dorian Phase II:        Anesthesia Post Evaluation    Patient location during evaluation: PACU  Patient participation: complete - patient participated  Level of consciousness: responsive to verbal stimuli and sleepy but conscious  Airway patency: patent  Complications: no  Cardiovascular status: blood pressure returned to baseline  Respiratory status: acceptable  Hydration status: stable  Comments: +Post-Anesthesia Evaluation and Assessment    Patient: Qasim Gomez MRN: [id number]  SSN: [id number]   YOB: 1933  Age: 80 y.o. Sex: male          Cardiovascular Function/Vital Signs    /88   Pulse 73   Temp 97.6 °F (36.4 °C) (Oral)   Resp 16   SpO2 98%     Patient is status post Procedure(s):  Remove & replace ICD biv multi leads  Lv lead placement. Nausea/Vomiting: Controlled. Postoperative hydration reviewed and adequate. Pain:      Managed. Neurological Status: At baseline. Mental Status and Level of Consciousness: Arousable. Pulmonary Status:       Adequate oxygenation and airway patent. Complications related to anesthesia: None    Post-anesthesia assessment completed. No concerns. I have evaluated the patient and the patient is stable and ready to be discharged from PACU .     Signed By: Junella Nageotte, MD

## 2023-05-11 NOTE — PROGRESS NOTES
EP/Cath LAB to Recovery Room Report    Procedure: Biventricular PPI     MD: Kenneth Calzada. MD Evelyne    Verbal Report given to Recovery Nurse on patient being transferred to Recovery Room for routine post-op. Patient stable upon transfer to . Pt had MAC sedation with Anesthesia team, who managed MAR, vitals, and airway. Vitals, mental status, MAR, procedural summary discussed with recovery RN. Procedural Site:    Incision site made to left chest.         Pacemaker Mode set to VVIR .

## 2023-05-11 NOTE — PROGRESS NOTES
TRANSFER - IN REPORT:    Verbal report received from Jalyn Quinteros and  Ernesto Sarmiento CRNA on Argelia Son  being received from procedure area for routine post-op. Report consisted of patients Situation, Background, Assessment and Recommendations(SBAR). Information from the following report(s) SBAR, Procedure Summary, Intake/Output, MAR, and Accordion recent results, Atrial paced was reviewed with the receiving clinician. Opportunity for questions and clarification was provided. Assessment completed upon patients arrival to [address] [place] [address]. Cardiac Cath Lab Recovery Arrival Note:    Argelia Ray arrived to [facility] recovery area. Patient procedure= BiV pacemaker upgrade. Patient on cardiac monitor, non-invasive blood pressure, SPO2 monitor. On  O2 @ 3 lpm via NC.  IV  of NS on pump at 25 ml/hr. Patient status doing well without problems. Patient is A&Ox 4. Patient reports no complaints. PROCEDURE SITE CHECK:    Procedure site:without any bleeding or hematoma, no pain/discomfort reported at procedure site. No change in patient status. Continue to monitor patient and status. 1340- Xray at bedside    1343-Icepack placed on site    1345- Gingerale provided and tolerated well. Family notified patient is in recovery and doing well.

## 2023-05-12 ENCOUNTER — TELEPHONE (OUTPATIENT)
Age: 88
End: 2023-05-12

## 2023-05-12 RX ORDER — LOSARTAN POTASSIUM 25 MG/1
25 TABLET ORAL DAILY
Qty: 30 TABLET | Refills: 5 | Status: SHIPPED | OUTPATIENT
Start: 2023-05-12

## 2023-05-12 NOTE — TELEPHONE ENCOUNTER
Pt's wife is calling because the doctor was suppose to have called in a rx for her  for losartan but the pharmacy stated that it was not called in. Pt had surgery yesterday.     [facility] [place], [place]   [phone]

## 2023-05-12 NOTE — PROCEDURES
Cardiac Procedure Note   Patient: Levi Hernandez  MRN: [id number]  SSN: [id number]   YOB: 1933 Age: 80 y.o.   Sex: male    Date of Procedure: 5/11/2023   Pre-procedure Diagnosis: atrial flutter, LBBB high degree av block, chronic systolic CHF LADD LVEF < 72% (no ICD per patient and age)  Post-procedure Diagnosis: same  Procedure: removal/extraction of RA lead  LV lead implant  Upgrade of pacer to biventricular pacer  :  Dr. Hui Mcneill MD    Assistant(s):  None  Anesthesia: Moderate Sedation by CRNA  Estimated Blood Loss: Less than 20 mL   Specimens Removed: [employer] pacer generator, RA lead  Findings: left subclavian vein occluded so RA lead removed  LV lead implanted in posterolateral branch  Complications: None   Implants: [employer] Abraham biventricular pacer  Signed by:  Hui Mcneill MD  5/11/2023  11:44 PM

## 2023-05-25 ENCOUNTER — PROCEDURE VISIT (OUTPATIENT)
Age: 88
End: 2023-05-25
Payer: MEDICARE

## 2023-05-25 DIAGNOSIS — Z95.0 BIVENTRICULAR CARDIAC PACEMAKER IN SITU: Primary | ICD-10-CM

## 2023-05-25 PROCEDURE — 93281 PM DEVICE PROGR EVAL MULTI: CPT | Performed by: INTERNAL MEDICINE

## 2023-05-25 NOTE — PROGRESS NOTES
C/ 2wk pacer ck/thresholds  Device functioning appropriately as programmed. See scanned documents    Patient presents for wound check post-device implantation. The dressing was removed and the site was inspected. Applied 3 steri strips and redressed with non-adherent dressing. Back on 5/30 for wound check. Future Appointments   Date Time Provider Petey Alatorre   5/30/2023  2:00 PM PACEMAKER, STFRANCES CAVSF BS AMB   9/13/2023  9:40 AM PACEMAKER, STANCES [facility] BS AMB   9/13/2023 10:00 AM Margaux Mcgarry MD CAVDAMIAN BS AMB   12/1/2023 12:30 PM [facility] ECHO 2 CAVSF BS AMB   12/1/2023  1:40 PM Avila Avina MD CAV BS AMB   12/5/2023  8:00 AM [address] CAVSF BS AMB         Continue follow up in device clinic as planned.

## 2023-05-30 ENCOUNTER — PROCEDURE VISIT (OUTPATIENT)
Age: 88
End: 2023-05-30

## 2023-05-30 DIAGNOSIS — Z95.0 BIVENTRICULAR CARDIAC PACEMAKER IN SITU: Primary | ICD-10-CM

## 2023-05-30 NOTE — PROGRESS NOTES
NC wound check, see encounter 5/25. The dressing was removed and the site was inspected. The site appeared to be well-healing without ecchymosis/tenderness/erythema. Denies pain, fevers, discharge. Future Appointments   Date Time Provider Petey Alatorre   9/13/2023  9:40 AM PACEMAKER, STFREMIR [facility]AV[facility] BS AMB   9/13/2023 10:00 AM Yvonne Tom MD [facility] BS AMB   12/1/2023 12:30 PM [facility] ECHO 2 CAVSF BS AMB   12/1/2023  1:40 PM Emerald Hamilton MD [facility] BS AMB   12/5/2023  8:00 AM [address] CAVSF BS AMB         Continue follow up in device clinic as planned.

## 2023-09-12 ENCOUNTER — TELEPHONE (OUTPATIENT)
Age: 88
End: 2023-09-12

## 2023-09-12 NOTE — TELEPHONE ENCOUNTER
Pt's wife called to cancel pacer check and 3 mo f/u on 9/13/23, wanted to know why pt needed appt, informed pt that it was routine, the wife stated the pt was fine and they were just seen recently. Would like to reschedule for a later date.        Abilio Richey (wife)  [phone]

## 2023-09-13 ENCOUNTER — OFFICE VISIT (OUTPATIENT)
Age: 88
End: 2023-09-13
Payer: MEDICARE

## 2023-09-13 ENCOUNTER — PROCEDURE VISIT (OUTPATIENT)
Age: 88
End: 2023-09-13

## 2023-09-13 DIAGNOSIS — Z95.0 BIVENTRICULAR CARDIAC PACEMAKER IN SITU: Primary | ICD-10-CM

## 2023-09-13 DIAGNOSIS — I44.7 LEFT BUNDLE-BRANCH BLOCK, UNSPECIFIED: ICD-10-CM

## 2023-09-13 DIAGNOSIS — I10 ESSENTIAL (PRIMARY) HYPERTENSION: ICD-10-CM

## 2023-09-13 DIAGNOSIS — I42.0 DILATED CARDIOMYOPATHY (HCC): ICD-10-CM

## 2023-09-13 DIAGNOSIS — I35.0 NONRHEUMATIC AORTIC (VALVE) STENOSIS: ICD-10-CM

## 2023-09-13 PROCEDURE — 99214 OFFICE O/P EST MOD 30 MIN: CPT | Performed by: INTERNAL MEDICINE

## 2023-09-13 NOTE — TELEPHONE ENCOUNTER
Patient completed appointment today with Dr. Jone Iniguez at [facility].     Future Appointments   Date Time Provider [address]   9/13/2023 10:40 AM PACEMAKER, STFRANCES [facility] BS AMB   9/13/2023 11:00 AM Sarah Barnard MD CAV[facility] BS AMB   12/1/2023 12:30 PM [facility] ECHO 2 CAV[facility] BS AMB   12/1/2023  1:40 PM Hever Eid MD CAV[facility] BS AMB   9/13/2024 10:00 AM PACEMAKER, STFRANCES [facility]AV[facility] BS AMB   9/13/2024 10:20 AM BRENNAN Saucedo - BETSY CAV[facility] BS AMB

## 2023-09-13 NOTE — PROGRESS NOTES
Cardiac Electrophysiology OFFICE VISIT Note         Subjective: Nahomy Rainey is an 80 y. o.patient who presents for follow up of dilated cardiomyopathy  He and his wife said he felt better after biv pacer upgrade in May    Not considered ready yet for TAVR after meeting with Dr Santiago Deutsch is Dec for follow up with echo  ECHO ADULT COMPLETE 01/10/2023 1/10/2023    Interpretation Summary    Left Ventricle: Severely reduced left ventricular systolic function with a visually estimated EF of 25 - 30%. EF by 2D Simpsons Biplane is 25%. Left ventricle size is normal. Mildly increased wall thickness. Findings consistent with concentric remodeling. Moderate global hypokinesis present. Abnormal diastolic function. Aortic Valve: Not well visualized. Severely calcified cusp. Mild to moderate regurgitation. Mitral Valve: Mildly thickened leaflet. Mild regurgitation. Tricuspid Valve: Mild regurgitation. Left Atrium: Left atrium is severely dilated. Technical qualifiers: Echo study was technically difficult with poor endocardial visualization. low flow/low gradient severe aortic stenosis with low EF. Signed by: Hollie Phoenix, MD on 1/10/2023 11:30 PM    He was seen by Dr Dillan Ba and he has LADD, running out of breaths quicker when he works around his house    Dr Dillan Ba thinks his AS is not severe yet    He was scheduled for AF ablation on 10/28/2022 but changed his mind. AT/AF burden via pacemaker was >99%. When [employer] rep checked his device, cannot reduce RV pacing  ms because his V rate was < 55 bpm    States he does not have syncope     BP controlled. Anticoagulated with Xarelto, had previous epistaxis but nothing recently. 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 [employer] Abraham dual chamber pacemaker (DOI 01/04/2019). He does not see Dr. Moses Abel any more. S/p CABG.          Problem List               Codes

## 2023-09-15 ENCOUNTER — HOSPITAL ENCOUNTER (EMERGENCY)
Facility: HOSPITAL | Age: 88
Discharge: HOME | End: 2023-09-15
Attending: STUDENT IN AN ORGANIZED HEALTH CARE EDUCATION/TRAINING PROGRAM
Payer: MEDICARE

## 2023-09-15 ENCOUNTER — APPOINTMENT (OUTPATIENT)
Facility: HOSPITAL | Age: 88
End: 2023-09-15
Payer: MEDICARE

## 2023-09-15 VITALS
SYSTOLIC BLOOD PRESSURE: 125 MMHG | HEIGHT: 71 IN | HEART RATE: 71 BPM | OXYGEN SATURATION: 96 % | RESPIRATION RATE: 16 BRPM | WEIGHT: 178 LBS | BODY MASS INDEX: 24.92 KG/M2 | DIASTOLIC BLOOD PRESSURE: 64 MMHG | TEMPERATURE: 99.5 F

## 2023-09-15 DIAGNOSIS — L03.116 CELLULITIS OF LEFT LOWER EXTREMITY: Primary | ICD-10-CM

## 2023-09-15 LAB
ALBUMIN SERPL-MCNC: 3 G/DL (ref 3.5–5)
ALBUMIN/GLOB SERPL: 0.8 (ref 1.1–2.2)
ALP SERPL-CCNC: 89 U/L (ref 45–117)
ALT SERPL-CCNC: 29 U/L (ref 12–78)
ANION GAP SERPL CALC-SCNC: 8 MMOL/L (ref 5–15)
APPEARANCE UR: ABNORMAL
AST SERPL-CCNC: 37 U/L (ref 15–37)
BACTERIA URNS QL MICRO: ABNORMAL /HPF
BASOPHILS # BLD: 0 K/UL (ref 0–0.1)
BASOPHILS NFR BLD: 0 % (ref 0–1)
BILIRUB SERPL-MCNC: 0.9 MG/DL (ref 0.2–1)
BILIRUB UR QL: NEGATIVE
BUN SERPL-MCNC: 28 MG/DL (ref 6–20)
BUN/CREAT SERPL: 20 (ref 12–20)
CALCIUM SERPL-MCNC: 8.1 MG/DL (ref 8.5–10.1)
CHLORIDE SERPL-SCNC: 101 MMOL/L (ref 97–108)
CO2 SERPL-SCNC: 23 MMOL/L (ref 21–32)
COLOR UR: ABNORMAL
CREAT SERPL-MCNC: 1.4 MG/DL (ref 0.7–1.3)
DIFFERENTIAL METHOD BLD: ABNORMAL
EOSINOPHIL # BLD: 0 K/UL (ref 0–0.4)
EOSINOPHIL NFR BLD: 0 % (ref 0–7)
EPITH CASTS URNS QL MICRO: ABNORMAL /LPF
ERYTHROCYTE [DISTWIDTH] IN BLOOD BY AUTOMATED COUNT: 14.4 % (ref 11.5–14.5)
GLOBULIN SER CALC-MCNC: 3.6 G/DL (ref 2–4)
GLUCOSE SERPL-MCNC: 116 MG/DL (ref 65–100)
GLUCOSE UR STRIP.AUTO-MCNC: NEGATIVE MG/DL
HCT VFR BLD AUTO: 38.6 % (ref 36.6–50.3)
HGB BLD-MCNC: 12.8 G/DL (ref 12.1–17)
HGB UR QL STRIP: NEGATIVE
HYALINE CASTS URNS QL MICRO: ABNORMAL /LPF (ref 0–5)
IMM GRANULOCYTES # BLD AUTO: 0 K/UL (ref 0–0.04)
IMM GRANULOCYTES NFR BLD AUTO: 0 % (ref 0–0.5)
KETONES UR QL STRIP.AUTO: NEGATIVE MG/DL
LEUKOCYTE ESTERASE UR QL STRIP.AUTO: NEGATIVE
LYMPHOCYTES # BLD: 0.7 K/UL (ref 0.8–3.5)
LYMPHOCYTES NFR BLD: 13 % (ref 12–49)
MCH RBC QN AUTO: 29.7 PG (ref 26–34)
MCHC RBC AUTO-ENTMCNC: 33.2 G/DL (ref 30–36.5)
MCV RBC AUTO: 89.6 FL (ref 80–99)
MONOCYTES # BLD: 0.6 K/UL (ref 0–1)
MONOCYTES NFR BLD: 10 % (ref 5–13)
MUCOUS THREADS URNS QL MICRO: ABNORMAL /LPF
NEUTS SEG # BLD: 4.2 K/UL (ref 1.8–8)
NEUTS SEG NFR BLD: 77 % (ref 32–75)
NITRITE UR QL STRIP.AUTO: NEGATIVE
NRBC # BLD: 0 K/UL (ref 0–0.01)
NRBC BLD-RTO: 0 PER 100 WBC
PH UR STRIP: 5.5 (ref 5–8)
PLATELET # BLD AUTO: 124 K/UL (ref 150–400)
PMV BLD AUTO: 12.4 FL (ref 8.9–12.9)
POTASSIUM SERPL-SCNC: 4.4 MMOL/L (ref 3.5–5.1)
PROT SERPL-MCNC: 6.6 G/DL (ref 6.4–8.2)
PROT UR STRIP-MCNC: ABNORMAL MG/DL
RBC # BLD AUTO: 4.31 M/UL (ref 4.1–5.7)
RBC #/AREA URNS HPF: ABNORMAL /HPF (ref 0–5)
RBC MORPH BLD: ABNORMAL
SARS-COV-2 RDRP RESP QL NAA+PROBE: NOT DETECTED
SODIUM SERPL-SCNC: 132 MMOL/L (ref 136–145)
SOURCE: NORMAL
SP GR UR REFRACTOMETRY: 1.02 (ref 1–1.03)
UROBILINOGEN UR QL STRIP.AUTO: 1 EU/DL (ref 0.2–1)
WBC # BLD AUTO: 5.5 K/UL (ref 4.1–11.1)
WBC URNS QL MICRO: ABNORMAL /HPF (ref 0–4)

## 2023-09-15 PROCEDURE — 81001 URINALYSIS AUTO W/SCOPE: CPT

## 2023-09-15 PROCEDURE — 85025 COMPLETE CBC W/AUTO DIFF WBC: CPT

## 2023-09-15 PROCEDURE — 71046 X-RAY EXAM CHEST 2 VIEWS: CPT

## 2023-09-15 PROCEDURE — 87635 SARS-COV-2 COVID-19 AMP PRB: CPT

## 2023-09-15 PROCEDURE — 99284 EMERGENCY DEPT VISIT MOD MDM: CPT

## 2023-09-15 PROCEDURE — 36415 COLL VENOUS BLD VENIPUNCTURE: CPT

## 2023-09-15 PROCEDURE — 80053 COMPREHEN METABOLIC PANEL: CPT

## 2023-09-15 PROCEDURE — 6370000000 HC RX 637 (ALT 250 FOR IP): Performed by: STUDENT IN AN ORGANIZED HEALTH CARE EDUCATION/TRAINING PROGRAM

## 2023-09-15 RX ORDER — DOXYCYCLINE HYCLATE 100 MG
100 TABLET ORAL 2 TIMES DAILY
Qty: 14 TABLET | Refills: 0 | Status: SHIPPED | OUTPATIENT
Start: 2023-09-15 | End: 2023-09-22

## 2023-09-15 RX ORDER — DOXYCYCLINE HYCLATE 100 MG
100 TABLET ORAL ONCE
Status: COMPLETED | OUTPATIENT
Start: 2023-09-15 | End: 2023-09-15

## 2023-09-15 RX ADMIN — DOXYCYCLINE HYCLATE 100 MG: 100 TABLET, COATED ORAL at 20:59

## 2023-09-15 ASSESSMENT — LIFESTYLE VARIABLES
HOW MANY STANDARD DRINKS CONTAINING ALCOHOL DO YOU HAVE ON A TYPICAL DAY: PATIENT DOES NOT DRINK
HOW OFTEN DO YOU HAVE A DRINK CONTAINING ALCOHOL: NEVER

## 2023-09-15 ASSESSMENT — PAIN - FUNCTIONAL ASSESSMENT: PAIN_FUNCTIONAL_ASSESSMENT: NONE - DENIES PAIN

## 2023-09-15 NOTE — ED TRIAGE NOTES
Pt arrives via [employer]. Report of \"Fever and weakness started today up to 103, has a dry cough possible abscess to left upper thigh. He has felt light headed today when getting up. VSS no chest pain no SOB, EKG done paced. \" Pt appears in no distress.

## 2023-09-16 NOTE — ED NOTES
The patient was discharged home by Dr. Denisse Burt and Mariel Manriquez rn in stable condition, accompanied by family. The patient is alert and oriented, is in no respiratory distress and has vital signs within normal limits . The patient's diagnosis, condition and treatment were explained to patient. The patient expressed understanding. No prescriptions given to pt. No work/school note given to pt. A discharge plan has been developed. A  was not involved in the process. Aftercare instructions were given to the patient. Pt's saline lock removed without complications. Family will transport pt home.       Clarita Dior RN  09/15/23 9637

## 2023-09-16 NOTE — ED PROVIDER NOTES
[facility] EMERGENCY DEPT  EMERGENCY DEPARTMENT ENCOUNTER      Pt Name: Todd Smith  MRN: [id number]  [address] [facility] [place] 8/21/1933  Date of evaluation: 9/15/2023  Provider: Shannan Chapa MD    CHIEF COMPLAINT       Chief Complaint   Patient presents with    Fever     fatigue    Fatigue    Skin Problem       HISTORY OF PRESENT ILLNESS    HPI    5year-old male with medical history as described below presenting for fever, rash, myalgias, decreased appetite. Report symptoms all started yesterday, with patient feeling slightly unwell. They noticed an area of redness and swelling to his left anterior thigh. His wife was recently diagnosed with MRSA started on antibiotics. Today he spiked a fever, which they took and was as high as 103. They gave him Tylenol about 4 hours prior to arrival, and now patient states that he feels very well. He denies dysuria, hematuria, urinary frequency. He reports a chronic cough but no productive cough, shortness of breath, chest pain. Nursing notes reviewed. REVIEW OF SYSTEMS     Review of Systems  Unless otherwise stated, a complete review of systems was asked of the patient. Pertinent positives are noted in the HPI section.     PAST MEDICAL HISTORY     Past Medical History:   Diagnosis Date    Arthritis     Hypercholesterolemia     Hypertension     SVT (supraventricular tachycardia) ([address])        SURGICAL HISTORY       Past Surgical History:   Procedure Laterality Date    CARDIAC SURGERY N/A 3/13/2019    ABLATION A-FLUTTER performed by Melchor Ramirez MD at [address] [place] [address] CATH LAB    EP DEVICE PROCEDURE N/A 5/11/2023    Remove & replace ICD biv multi leads performed by Melchor Ramirez MD at [address] [place] [address] CATH LAB    EP DEVICE PROCEDURE N/A 5/11/2023    Lv lead placement performed by Melchor Ramirez MD at [address] [place] [address] CATH LAB    [address] ADD ON N/A 3/13/2019    Ablation Svt/Vt Add On performed by Melchor Ramirez MD at [address] [place] [address] CATH LAB    INS NEW/RPLCMT PRM PM

## 2023-11-08 ENCOUNTER — TELEPHONE (OUTPATIENT)
Age: 88
End: 2023-11-08

## 2023-11-08 RX ORDER — LOSARTAN POTASSIUM 25 MG/1
25 TABLET ORAL DAILY
Qty: 90 TABLET | Refills: 3 | Status: SHIPPED | OUTPATIENT
Start: 2023-11-08

## 2023-11-08 NOTE — TELEPHONE ENCOUNTER
Verified patient with two types of identifiers. Spoke with patient and wife Kelsy Hanjewel. She states that patients friend recently passed away and had just gotten a prescription for xarelto 15mg and they wanted to see if patient could take that. I advised patient and wife that unfortunately this is not the correct dosage and that he should not take a prescription that is not his own. Patient verbalized understanding and will call with any other questions.

## 2023-11-08 NOTE — TELEPHONE ENCOUNTER
Requested Prescriptions     Signed Prescriptions Disp Refills    losartan (COZAAR) 25 MG tablet 90 tablet 3     Sig: Take 1 tablet by mouth once daily     Authorizing Provider: Betsy Ba     Ordering User: Leobardo Alvarado     Verbal order per Dr. Jessica Rodrigues   Date Time Provider [address]   12/1/2023 12:30 PM [facility] ECHO 2 CAVSF  AMB   12/1/2023  1:40 PM [facility]   9/13/2024 10:00 AM PACEMAKER, STLOI CAVSF  AMB   9/13/2024 10:20 AM BRENNAN Guidry - BETSY [facility] AMB

## 2023-11-16 ENCOUNTER — TELEPHONE (OUTPATIENT)
Age: 88
End: 2023-11-16

## 2023-11-16 DIAGNOSIS — I35.0 NONRHEUMATIC AORTIC (VALVE) STENOSIS: Primary | ICD-10-CM

## 2023-11-16 DIAGNOSIS — I42.0 DILATED CARDIOMYOPATHY (HCC): ICD-10-CM

## 2023-11-16 NOTE — TELEPHONE ENCOUNTER
----- Message from BRENNAN Ragland CNP sent at 11/16/2023  3:46 PM EST -----  Regarding: RE: Order please  I would do a complete echo    ----- Message -----  From: Merry Chino RN  Sent: 11/16/2023   2:50 PM EST  To: BRENNAN Ragland CNP; #  Subject: FW: Order please                                 Does he just need a limited?    ----- Message -----  From: Rhiannon Morrow  Sent: 11/16/2023  12:51 PM EST  To: Merry Chino RN  Subject: Order please                                     Gladys Ramirez,     Please provide an order for scheduled echocardiogram.     Thank you,     Nando Bloom

## 2024-03-08 ENCOUNTER — TELEPHONE (OUTPATIENT)
Age: 89
End: 2024-03-08

## 2024-03-08 NOTE — TELEPHONE ENCOUNTER
Left message for patient to reschedule appointment on 03/15/224 with Dr Young. Provider will not be in the office. Patient can keep the echo or reschedule or reschedule both together.     darion

## 2024-05-16 ENCOUNTER — TELEPHONE (OUTPATIENT)
Age: 89
End: 2024-05-16

## 2024-05-17 ENCOUNTER — OFFICE VISIT (OUTPATIENT)
Age: 89
End: 2024-05-17
Payer: MEDICARE

## 2024-05-17 ENCOUNTER — ANCILLARY PROCEDURE (OUTPATIENT)
Age: 89
End: 2024-05-17
Payer: MEDICARE

## 2024-05-17 VITALS
DIASTOLIC BLOOD PRESSURE: 70 MMHG | OXYGEN SATURATION: 98 % | RESPIRATION RATE: 16 BRPM | SYSTOLIC BLOOD PRESSURE: 110 MMHG | BODY MASS INDEX: 25.76 KG/M2 | HEART RATE: 71 BPM | HEIGHT: 71 IN | WEIGHT: 184 LBS

## 2024-05-17 VITALS
HEART RATE: 72 BPM | WEIGHT: 184.4 LBS | BODY MASS INDEX: 25.81 KG/M2 | HEIGHT: 71 IN | DIASTOLIC BLOOD PRESSURE: 75 MMHG | SYSTOLIC BLOOD PRESSURE: 134 MMHG

## 2024-05-17 DIAGNOSIS — I25.10 CORONARY ARTERY DISEASE INVOLVING NATIVE CORONARY ARTERY OF NATIVE HEART WITHOUT ANGINA PECTORIS: ICD-10-CM

## 2024-05-17 DIAGNOSIS — I48.3 TYPICAL ATRIAL FLUTTER (HCC): ICD-10-CM

## 2024-05-17 DIAGNOSIS — I35.0 NONRHEUMATIC AORTIC (VALVE) STENOSIS: ICD-10-CM

## 2024-05-17 DIAGNOSIS — I42.0 DILATED CARDIOMYOPATHY (HCC): ICD-10-CM

## 2024-05-17 DIAGNOSIS — I50.22 CHRONIC SYSTOLIC CHF (CONGESTIVE HEART FAILURE), NYHA CLASS 2 (HCC): ICD-10-CM

## 2024-05-17 DIAGNOSIS — I10 ESSENTIAL HYPERTENSION: ICD-10-CM

## 2024-05-17 DIAGNOSIS — N18.31 CHRONIC KIDNEY DISEASE, STAGE 3A (HCC): ICD-10-CM

## 2024-05-17 DIAGNOSIS — I35.0 NONRHEUMATIC AORTIC VALVE STENOSIS: Primary | ICD-10-CM

## 2024-05-17 DIAGNOSIS — E78.2 MIXED HYPERLIPIDEMIA: ICD-10-CM

## 2024-05-17 PROCEDURE — G8419 CALC BMI OUT NRM PARAM NOF/U: HCPCS | Performed by: INTERNAL MEDICINE

## 2024-05-17 PROCEDURE — 99214 OFFICE O/P EST MOD 30 MIN: CPT | Performed by: INTERNAL MEDICINE

## 2024-05-17 PROCEDURE — C8929 TTE W OR WO FOL WCON,DOPPLER: HCPCS | Performed by: INTERNAL MEDICINE

## 2024-05-17 PROCEDURE — G8428 CUR MEDS NOT DOCUMENT: HCPCS | Performed by: INTERNAL MEDICINE

## 2024-05-17 PROCEDURE — 1123F ACP DISCUSS/DSCN MKR DOCD: CPT | Performed by: INTERNAL MEDICINE

## 2024-05-17 PROCEDURE — 1036F TOBACCO NON-USER: CPT | Performed by: INTERNAL MEDICINE

## 2024-05-17 RX ADMIN — PERFLUTREN 1.3 ML: 6.52 INJECTION, SUSPENSION INTRAVENOUS at 13:44

## 2024-05-17 ASSESSMENT — PATIENT HEALTH QUESTIONNAIRE - PHQ9
SUM OF ALL RESPONSES TO PHQ QUESTIONS 1-9: 0
SUM OF ALL RESPONSES TO PHQ QUESTIONS 1-9: 0
SUM OF ALL RESPONSES TO PHQ9 QUESTIONS 1 & 2: 0
2. FEELING DOWN, DEPRESSED OR HOPELESS: NOT AT ALL
1. LITTLE INTEREST OR PLEASURE IN DOING THINGS: NOT AT ALL
SUM OF ALL RESPONSES TO PHQ QUESTIONS 1-9: 0
SUM OF ALL RESPONSES TO PHQ QUESTIONS 1-9: 0

## 2024-05-17 NOTE — PROGRESS NOTES
Dr. Young                                  [facility].  [phone]               Cardiology structural heart disease consult/Progress Note        Requesting/referring provider: Srinivasan Padilla PA, Dr. Stubbs      Reason for Consult: Aortic stenosis      Assessment/Plan:  1.  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  2.  Coronary disease, s/p coronary bypass grafting.  3.  Dual-chamber pacemaker  placement for sinus node dysfunction  4.  Hypertension  5.  Hyperlipidemia  6.  Severe aortic stenosis with minimal symptoms  7.  History of cardiomyopathy multiple factors including RV pacing and coronary  artery disease    Mr. Campoverde is seen at the request of Dr. Stubbs.  He has minimal shortness of breath.  He does have aortic stenosis which clinically is now severe.  His echocardiogram that was reviewed from past month also is consistent with severe aortic sinus with calculated aortic valve area of 0.8 cm.  Peak velocity velocity of 3.7 to 3.9 m/s.    His symptoms appear to be rather mild and stable.  I have discussed the options of considering aortic valve intervention versus continuing close surveillance.  His mild symptoms may be not necessarily related to his aortic valve cyst have been chronic and predate development of severe aortic stenosis.  At this point in time, patient and his family want to continue surveillance for development of progressive symptoms.  He will continue his routine follow-up with Dr. Stubbs and yearly follow-up with me.    Natural course and history of aortic stenosis were discussed with the patient along with her high risk of mortality of 50% at 2 to 5 years independent of noncardiac issues.     Investigations ordered      []    High complexity decision making was performed   []    Patient is at high-risk of decompensation  with multiple organ involvement   []    Complex/difficult social determinants of  health outcomes

## 2024-05-20 LAB
ECHO AO ROOT DIAM: 3.2 CM
ECHO AO ROOT INDEX: 1.57 CM/M2
ECHO AV AREA PEAK VELOCITY: 0.6 CM2
ECHO AV AREA VTI: 0.6 CM2
ECHO AV AREA/BSA PEAK VELOCITY: 0.3 CM2/M2
ECHO AV AREA/BSA VTI: 0.3 CM2/M2
ECHO AV MEAN GRADIENT: 35 MMHG
ECHO AV MEAN VELOCITY: 2.8 M/S
ECHO AV PEAK GRADIENT: 60 MMHG
ECHO AV PEAK VELOCITY: 3.9 M/S
ECHO AV VELOCITY RATIO: 0.13
ECHO AV VTI: 81.7 CM
ECHO BSA: 2.04 M2
ECHO EST RA PRESSURE: 3 MMHG
ECHO LA DIAMETER INDEX: 2.45 CM/M2
ECHO LA DIAMETER: 5 CM
ECHO LA TO AORTIC ROOT RATIO: 1.56
ECHO LA VOL A-L A2C: 79 ML (ref 18–58)
ECHO LA VOL A-L A4C: 123 ML (ref 18–58)
ECHO LA VOL BP: 95 ML (ref 18–58)
ECHO LA VOL MOD A2C: 76 ML (ref 18–58)
ECHO LA VOL MOD A4C: 110 ML (ref 18–58)
ECHO LA VOL/BSA BIPLANE: 47 ML/M2 (ref 16–34)
ECHO LA VOLUME AREA LENGTH: 102 ML
ECHO LA VOLUME INDEX A-L A2C: 39 ML/M2 (ref 16–34)
ECHO LA VOLUME INDEX A-L A4C: 60 ML/M2 (ref 16–34)
ECHO LA VOLUME INDEX AREA LENGTH: 50 ML/M2 (ref 16–34)
ECHO LA VOLUME INDEX MOD A2C: 37 ML/M2 (ref 16–34)
ECHO LA VOLUME INDEX MOD A4C: 54 ML/M2 (ref 16–34)
ECHO LV E' LATERAL VELOCITY: 9 CM/S
ECHO LV E' SEPTAL VELOCITY: 6 CM/S
ECHO LV EDV A2C: 57 ML
ECHO LV EDV A4C: 61 ML
ECHO LV EDV BP: 61 ML (ref 67–155)
ECHO LV EDV INDEX A4C: 30 ML/M2
ECHO LV EDV INDEX BP: 30 ML/M2
ECHO LV EDV NDEX A2C: 28 ML/M2
ECHO LV EJECTION FRACTION A2C: 59 %
ECHO LV EJECTION FRACTION A4C: 63 %
ECHO LV EJECTION FRACTION BIPLANE: 59 % (ref 55–100)
ECHO LV ESV A2C: 24 ML
ECHO LV ESV A4C: 23 ML
ECHO LV ESV BP: 25 ML (ref 22–58)
ECHO LV ESV INDEX A2C: 12 ML/M2
ECHO LV ESV INDEX A4C: 11 ML/M2
ECHO LV ESV INDEX BP: 12 ML/M2
ECHO LV FRACTIONAL SHORTENING: 24 % (ref 28–44)
ECHO LV INTERNAL DIMENSION DIASTOLE INDEX: 2.21 CM/M2
ECHO LV INTERNAL DIMENSION DIASTOLIC: 4.5 CM (ref 4.2–5.9)
ECHO LV INTERNAL DIMENSION SYSTOLIC INDEX: 1.67 CM/M2
ECHO LV INTERNAL DIMENSION SYSTOLIC: 3.4 CM
ECHO LV IVSD: 1.4 CM (ref 0.6–1)
ECHO LV MASS 2D: 248.4 G (ref 88–224)
ECHO LV MASS INDEX 2D: 121.8 G/M2 (ref 49–115)
ECHO LV POSTERIOR WALL DIASTOLIC: 1.4 CM (ref 0.6–1)
ECHO LV RELATIVE WALL THICKNESS RATIO: 0.62
ECHO LVOT AREA: 4.5 CM2
ECHO LVOT AV VTI INDEX: 0.15
ECHO LVOT DIAM: 2.4 CM
ECHO LVOT MEAN GRADIENT: 1 MMHG
ECHO LVOT PEAK GRADIENT: 1 MMHG
ECHO LVOT PEAK VELOCITY: 0.5 M/S
ECHO LVOT STROKE VOLUME INDEX: 26.4 ML/M2
ECHO LVOT SV: 53.8 ML
ECHO LVOT VTI: 11.9 CM
ECHO MV A VELOCITY: 0.4 M/S
ECHO MV AREA PHT: 5.9 CM2
ECHO MV E DECELERATION TIME (DT): 129.6 MS
ECHO MV E VELOCITY: 0.88 M/S
ECHO MV E/A RATIO: 2.2
ECHO MV E/E' LATERAL: 9.78
ECHO MV E/E' RATIO (AVERAGED): 12.22
ECHO MV E/E' SEPTAL: 14.67
ECHO MV PRESSURE HALF TIME (PHT): 37.6 MS
ECHO RIGHT VENTRICULAR SYSTOLIC PRESSURE (RVSP): 23 MMHG
ECHO RV TAPSE: 0.9 CM (ref 1.7–?)
ECHO TV REGURGITANT MAX VELOCITY: 2.25 M/S
ECHO TV REGURGITANT PEAK GRADIENT: 20 MMHG

## 2024-05-20 PROCEDURE — PBSHW PBB SHADOW CHARGE: Performed by: INTERNAL MEDICINE

## 2024-05-20 PROCEDURE — 93306 TTE W/DOPPLER COMPLETE: CPT | Performed by: INTERNAL MEDICINE

## 2024-06-05 ENCOUNTER — TELEPHONE (OUTPATIENT)
Age: 89
End: 2024-06-05

## 2024-06-05 NOTE — TELEPHONE ENCOUNTER
LVM with Pt. Cancelled 09/123 appointment for pacemaker and to see JANINE Perdomo.     Please Reschedule.

## 2024-07-02 ENCOUNTER — TELEPHONE (OUTPATIENT)
Age: 89
End: 2024-07-02

## 2024-07-02 NOTE — TELEPHONE ENCOUNTER
Spoke to patient spouse and scheduled appointment.     Future Appointments   Date Time Provider Department Center   10/2/2024  1:00 PM PACEMAKER, STFRANCES [facility] BS AMB   10/2/2024  1:20 PM Tiki Last APRN - CNP CAV[facility] BS AMB   5/16/2025 12:30 PM [facility] ECHO 1 CAV[facility] BS AMB   5/16/2025  1:40 PM Milind Young MD [facility]AV[facility] BS AMB       Confirmed understanding of date/time/ provider/location.     [facility]

## 2024-07-27 PROCEDURE — 93294 REM INTERROG EVL PM/LDLS PM: CPT | Performed by: INTERNAL MEDICINE

## 2024-09-24 NOTE — PROGRESS NOTES
Cardiac Electrophysiology Office Follow-up    NAME:  Panchito Crespo   :  1933  MRM:  [id number]    Date:  10/2/2024     Primary Cardiologist: Hector/Evelyne    Assessment and Plan:     1. Chronic systolic CHF (congestive heart failure), NYHA class 2 (HCC)  -     EKG 12 Lead  2. Dilated cardiomyopathy (HCC)  -     EKG 12 Lead  3. Biventricular cardiac pacemaker in situ  -     EKG 12 Lead  4. Coronary artery disease involving native coronary artery of native heart without angina pectoris  -     EKG 12 Lead  5. Essential hypertension  -     EKG 12 Lead  6. Typical atrial flutter (HCC)  -     EKG 12 Lead  7. Persistent atrial fibrillation (HCC)  -     EKG 12 Lead       Persistent AF/AFL: at patient's wish, no ablation at his age he preferred pacing  - Continue Metoprolol 50 mg daily    [employer] Abraham biventricular pacemaker (DOI RV 1/3/2019, LV/generator 23): Device check today shows proper lead & generator function. Generator longevity estimated 5.9-6.4 years. RV 99% effective CRT 99%.  No sustained VT/VF.  Iterative programming was performed to test the leads sensing and threshold parameters without any permanent changes.  - His QRS LBBB used to be 160 ms before CRT  - Continue remote device transmissions every 3 months  - Follow-up in EP clinic in 1 year or sooner with any concerns    NICM/AS: LVEF < 30%  NYHA II chronic systolic CHF.  Continue GDMT.   He felt better with BIV pacing  - Updated echo 2024 with LVEF 55-60%, severe AS with AV mean gradient of 35 mmHg.      CAD CABG- no angina    Does not see Dr Ayala any more    HTN: BP was controlled    Anticoagulation: Continue Xarelto for embolic CVA prophylaxis.  Denies bleeding issues.      Moderate AR/AS- not yet candidate for TAVR yet per Dr Young  He remains asymptomatic after biventricular pacer. Continuing surveillance for development of progressive symptoms.   - Echo 24 with severe AS  - Repeat echo and followed scheduled with Dr. Young

## 2024-10-02 ENCOUNTER — PROCEDURE VISIT (OUTPATIENT)
Age: 89
End: 2024-10-02

## 2024-10-02 ENCOUNTER — OFFICE VISIT (OUTPATIENT)
Age: 89
End: 2024-10-02
Payer: MEDICARE

## 2024-10-02 VITALS
BODY MASS INDEX: 25.81 KG/M2 | HEART RATE: 69 BPM | DIASTOLIC BLOOD PRESSURE: 82 MMHG | HEIGHT: 71 IN | SYSTOLIC BLOOD PRESSURE: 138 MMHG | OXYGEN SATURATION: 98 % | WEIGHT: 184.4 LBS

## 2024-10-02 DIAGNOSIS — I48.19 PERSISTENT ATRIAL FIBRILLATION (HCC): ICD-10-CM

## 2024-10-02 DIAGNOSIS — I25.10 CORONARY ARTERY DISEASE INVOLVING NATIVE CORONARY ARTERY OF NATIVE HEART WITHOUT ANGINA PECTORIS: ICD-10-CM

## 2024-10-02 DIAGNOSIS — Z95.0 BIVENTRICULAR CARDIAC PACEMAKER IN SITU: Primary | ICD-10-CM

## 2024-10-02 DIAGNOSIS — I42.0 DILATED CARDIOMYOPATHY (HCC): ICD-10-CM

## 2024-10-02 DIAGNOSIS — I48.3 TYPICAL ATRIAL FLUTTER (HCC): ICD-10-CM

## 2024-10-02 DIAGNOSIS — I10 ESSENTIAL HYPERTENSION: ICD-10-CM

## 2024-10-02 DIAGNOSIS — Z95.0 BIVENTRICULAR CARDIAC PACEMAKER IN SITU: ICD-10-CM

## 2024-10-02 DIAGNOSIS — I50.22 CHRONIC SYSTOLIC CHF (CONGESTIVE HEART FAILURE), NYHA CLASS 2 (HCC): Primary | ICD-10-CM

## 2024-10-02 PROCEDURE — G8484 FLU IMMUNIZE NO ADMIN: HCPCS

## 2024-10-02 PROCEDURE — 1123F ACP DISCUSS/DSCN MKR DOCD: CPT

## 2024-10-02 PROCEDURE — G8419 CALC BMI OUT NRM PARAM NOF/U: HCPCS

## 2024-10-02 PROCEDURE — G8427 DOCREV CUR MEDS BY ELIG CLIN: HCPCS

## 2024-10-02 PROCEDURE — 99214 OFFICE O/P EST MOD 30 MIN: CPT

## 2024-10-02 PROCEDURE — 1036F TOBACCO NON-USER: CPT

## 2024-10-02 NOTE — PROGRESS NOTES
Chief Complaint   Patient presents with    NAVS    Cardiomyopathy     There were no vitals filed for this visit.      Chest pain: DENIED     Recent hospital stays: DENIED     Refills: DENIED

## 2024-10-02 NOTE — PROGRESS NOTES
Chief Complaint   Patient presents with    NAVS    Cardiomyopathy     Vitals:    10/02/24 1315   BP: 138/82   Site: Left Upper Arm   Position: Sitting   Pulse: 69   SpO2: 98%   Weight: 83.6 kg (184 lb 6.4 oz)   Height: 1.803 m (5' 11\")     Chest pain: DENIED     Recent hospital stays: DENIED     Refills: DENIED

## 2024-10-18 ENCOUNTER — HOSPITAL ENCOUNTER (INPATIENT)
Facility: HOSPITAL | Age: 89
LOS: 4 days | Discharge: HOME HEALTH | DRG: 069 | End: 2024-10-22
Attending: EMERGENCY MEDICINE | Admitting: STUDENT IN AN ORGANIZED HEALTH CARE EDUCATION/TRAINING PROGRAM
Payer: MEDICARE

## 2024-10-18 ENCOUNTER — APPOINTMENT (OUTPATIENT)
Facility: HOSPITAL | Age: 89
DRG: 069 | End: 2024-10-18
Payer: MEDICARE

## 2024-10-18 DIAGNOSIS — T68.XXXA HYPOTHERMIA, INITIAL ENCOUNTER: ICD-10-CM

## 2024-10-18 DIAGNOSIS — I63.9 CEREBROVASCULAR ACCIDENT (CVA), UNSPECIFIED MECHANISM (HCC): Primary | ICD-10-CM

## 2024-10-18 DIAGNOSIS — R29.810 FACIAL DROOP: ICD-10-CM

## 2024-10-18 LAB
ALBUMIN SERPL-MCNC: 3.3 G/DL (ref 3.5–5)
ALBUMIN/GLOB SERPL: 0.9 (ref 1.1–2.2)
ALP SERPL-CCNC: 61 U/L (ref 45–117)
ALT SERPL-CCNC: 20 U/L (ref 12–78)
ANION GAP SERPL CALC-SCNC: 5 MMOL/L (ref 2–12)
AST SERPL-CCNC: 16 U/L (ref 15–37)
BASOPHILS # BLD: 0 K/UL (ref 0–0.1)
BASOPHILS NFR BLD: 0 % (ref 0–1)
BILIRUB SERPL-MCNC: 0.6 MG/DL (ref 0.2–1)
BUN SERPL-MCNC: 25 MG/DL (ref 6–20)
BUN/CREAT SERPL: 19 (ref 12–20)
CALCIUM SERPL-MCNC: 8.4 MG/DL (ref 8.5–10.1)
CHLORIDE SERPL-SCNC: 109 MMOL/L (ref 97–108)
CO2 SERPL-SCNC: 24 MMOL/L (ref 21–32)
CREAT SERPL-MCNC: 1.33 MG/DL (ref 0.7–1.3)
DIFFERENTIAL METHOD BLD: NORMAL
EOSINOPHIL # BLD: 0.4 K/UL (ref 0–0.4)
EOSINOPHIL NFR BLD: 5 % (ref 0–7)
ERYTHROCYTE [DISTWIDTH] IN BLOOD BY AUTOMATED COUNT: 14.3 % (ref 11.5–14.5)
GLOBULIN SER CALC-MCNC: 3.7 G/DL (ref 2–4)
GLUCOSE BLD STRIP.AUTO-MCNC: 71 MG/DL (ref 65–117)
GLUCOSE SERPL-MCNC: 80 MG/DL (ref 65–100)
HCT VFR BLD AUTO: 41.8 % (ref 36.6–50.3)
HGB BLD-MCNC: 13.5 G/DL (ref 12.1–17)
IMM GRANULOCYTES # BLD AUTO: 0 K/UL (ref 0–0.04)
IMM GRANULOCYTES NFR BLD AUTO: 0 % (ref 0–0.5)
INR PPP: 1.5 (ref 0.9–1.1)
LACTATE SERPL-SCNC: 1.7 MMOL/L (ref 0.4–2)
LYMPHOCYTES # BLD: 2.1 K/UL (ref 0.8–3.5)
LYMPHOCYTES NFR BLD: 27 % (ref 12–49)
MCH RBC QN AUTO: 29.6 PG (ref 26–34)
MCHC RBC AUTO-ENTMCNC: 32.3 G/DL (ref 30–36.5)
MCV RBC AUTO: 91.7 FL (ref 80–99)
MONOCYTES # BLD: 0.9 K/UL (ref 0–1)
MONOCYTES NFR BLD: 11 % (ref 5–13)
NEUTS SEG # BLD: 4.5 K/UL (ref 1.8–8)
NEUTS SEG NFR BLD: 57 % (ref 32–75)
NRBC # BLD: 0 K/UL (ref 0–0.01)
NRBC BLD-RTO: 0 PER 100 WBC
PLATELET # BLD AUTO: 174 K/UL (ref 150–400)
PMV BLD AUTO: 12.3 FL (ref 8.9–12.9)
POTASSIUM SERPL-SCNC: 4 MMOL/L (ref 3.5–5.1)
PROT SERPL-MCNC: 7 G/DL (ref 6.4–8.2)
PROTHROMBIN TIME: 14.7 SEC (ref 9–11.1)
RBC # BLD AUTO: 4.56 M/UL (ref 4.1–5.7)
SERVICE CMNT-IMP: NORMAL
SODIUM SERPL-SCNC: 138 MMOL/L (ref 136–145)
TROPONIN I SERPL HS-MCNC: 17 NG/L (ref 0–76)
WBC # BLD AUTO: 7.9 K/UL (ref 4.1–11.1)

## 2024-10-18 PROCEDURE — 6370000000 HC RX 637 (ALT 250 FOR IP): Performed by: STUDENT IN AN ORGANIZED HEALTH CARE EDUCATION/TRAINING PROGRAM

## 2024-10-18 PROCEDURE — 84484 ASSAY OF TROPONIN QUANT: CPT

## 2024-10-18 PROCEDURE — 6370000000 HC RX 637 (ALT 250 FOR IP): Performed by: EMERGENCY MEDICINE

## 2024-10-18 PROCEDURE — 85610 PROTHROMBIN TIME: CPT

## 2024-10-18 PROCEDURE — 70496 CT ANGIOGRAPHY HEAD: CPT

## 2024-10-18 PROCEDURE — 2580000003 HC RX 258: Performed by: STUDENT IN AN ORGANIZED HEALTH CARE EDUCATION/TRAINING PROGRAM

## 2024-10-18 PROCEDURE — 85025 COMPLETE CBC W/AUTO DIFF WBC: CPT

## 2024-10-18 PROCEDURE — 36415 COLL VENOUS BLD VENIPUNCTURE: CPT

## 2024-10-18 PROCEDURE — 82962 GLUCOSE BLOOD TEST: CPT

## 2024-10-18 PROCEDURE — 99285 EMERGENCY DEPT VISIT HI MDM: CPT

## 2024-10-18 PROCEDURE — 0042T CT BRAIN PERFUSION: CPT

## 2024-10-18 PROCEDURE — 83605 ASSAY OF LACTIC ACID: CPT

## 2024-10-18 PROCEDURE — 94761 N-INVAS EAR/PLS OXIMETRY MLT: CPT

## 2024-10-18 PROCEDURE — 71045 X-RAY EXAM CHEST 1 VIEW: CPT

## 2024-10-18 PROCEDURE — 93005 ELECTROCARDIOGRAM TRACING: CPT | Performed by: EMERGENCY MEDICINE

## 2024-10-18 PROCEDURE — 6360000004 HC RX CONTRAST MEDICATION

## 2024-10-18 PROCEDURE — 4A03X5D MEASUREMENT OF ARTERIAL FLOW, INTRACRANIAL, EXTERNAL APPROACH: ICD-10-PCS | Performed by: STUDENT IN AN ORGANIZED HEALTH CARE EDUCATION/TRAINING PROGRAM

## 2024-10-18 PROCEDURE — 80053 COMPREHEN METABOLIC PANEL: CPT

## 2024-10-18 PROCEDURE — 70450 CT HEAD/BRAIN W/O DYE: CPT

## 2024-10-18 PROCEDURE — 2060000000 HC ICU INTERMEDIATE R&B

## 2024-10-18 PROCEDURE — 81001 URINALYSIS AUTO W/SCOPE: CPT

## 2024-10-18 RX ORDER — ASPIRIN 81 MG/1
81 TABLET, CHEWABLE ORAL ONCE
Status: COMPLETED | OUTPATIENT
Start: 2024-10-18 | End: 2024-10-18

## 2024-10-18 RX ORDER — ONDANSETRON 4 MG/1
4 TABLET, ORALLY DISINTEGRATING ORAL EVERY 8 HOURS PRN
Status: DISCONTINUED | OUTPATIENT
Start: 2024-10-18 | End: 2024-10-22 | Stop reason: HOSPADM

## 2024-10-18 RX ORDER — ASPIRIN 300 MG/1
300 SUPPOSITORY RECTAL DAILY
Status: DISCONTINUED | OUTPATIENT
Start: 2024-10-19 | End: 2024-10-22 | Stop reason: HOSPADM

## 2024-10-18 RX ORDER — SODIUM CHLORIDE 9 MG/ML
INJECTION, SOLUTION INTRAVENOUS PRN
Status: DISCONTINUED | OUTPATIENT
Start: 2024-10-18 | End: 2024-10-22 | Stop reason: HOSPADM

## 2024-10-18 RX ORDER — PANTOPRAZOLE SODIUM 40 MG/1
40 TABLET, DELAYED RELEASE ORAL
Status: DISCONTINUED | OUTPATIENT
Start: 2024-10-19 | End: 2024-10-22 | Stop reason: HOSPADM

## 2024-10-18 RX ORDER — SODIUM CHLORIDE 9 MG/ML
INJECTION, SOLUTION INTRAVENOUS CONTINUOUS
Status: DISPENSED | OUTPATIENT
Start: 2024-10-18 | End: 2024-10-19

## 2024-10-18 RX ORDER — SODIUM CHLORIDE 0.9 % (FLUSH) 0.9 %
5-40 SYRINGE (ML) INJECTION PRN
Status: DISCONTINUED | OUTPATIENT
Start: 2024-10-18 | End: 2024-10-22 | Stop reason: HOSPADM

## 2024-10-18 RX ORDER — IOPAMIDOL 755 MG/ML
140 INJECTION, SOLUTION INTRAVASCULAR
Status: COMPLETED | OUTPATIENT
Start: 2024-10-18 | End: 2024-10-18

## 2024-10-18 RX ORDER — POLYETHYLENE GLYCOL 3350 17 G/17G
17 POWDER, FOR SOLUTION ORAL DAILY PRN
Status: DISCONTINUED | OUTPATIENT
Start: 2024-10-18 | End: 2024-10-22 | Stop reason: HOSPADM

## 2024-10-18 RX ORDER — ROSUVASTATIN CALCIUM 10 MG/1
40 TABLET, COATED ORAL NIGHTLY
Status: DISCONTINUED | OUTPATIENT
Start: 2024-10-18 | End: 2024-10-19

## 2024-10-18 RX ORDER — SODIUM CHLORIDE 0.9 % (FLUSH) 0.9 %
5-40 SYRINGE (ML) INJECTION EVERY 12 HOURS SCHEDULED
Status: DISCONTINUED | OUTPATIENT
Start: 2024-10-18 | End: 2024-10-22 | Stop reason: HOSPADM

## 2024-10-18 RX ORDER — ONDANSETRON 2 MG/ML
4 INJECTION INTRAMUSCULAR; INTRAVENOUS EVERY 6 HOURS PRN
Status: DISCONTINUED | OUTPATIENT
Start: 2024-10-18 | End: 2024-10-22 | Stop reason: HOSPADM

## 2024-10-18 RX ORDER — ASPIRIN 81 MG/1
81 TABLET, CHEWABLE ORAL DAILY
Status: DISCONTINUED | OUTPATIENT
Start: 2024-10-19 | End: 2024-10-22 | Stop reason: HOSPADM

## 2024-10-18 RX ADMIN — ASPIRIN 81 MG: 81 TABLET, CHEWABLE ORAL at 14:42

## 2024-10-18 RX ADMIN — SODIUM CHLORIDE: 9 INJECTION, SOLUTION INTRAVENOUS at 22:48

## 2024-10-18 RX ADMIN — IOPAMIDOL 140 ML: 755 INJECTION, SOLUTION INTRAVENOUS at 14:13

## 2024-10-18 RX ADMIN — ROSUVASTATIN CALCIUM 40 MG: 10 TABLET, FILM COATED ORAL at 20:26

## 2024-10-18 RX ADMIN — SODIUM CHLORIDE, PRESERVATIVE FREE 10 ML: 5 INJECTION INTRAVENOUS at 20:10

## 2024-10-18 NOTE — ED NOTES
Stroke Education provided to patient, spouse/SO, and relative(s) and the following topics were discussed    1. Patients personal risk factors for stroke are none    2. Warning signs of Stroke:        * Sudden numbness or weakness of the face, arm or leg, especially on one side of          The body            * Sudden confusion, trouble speaking or understanding        * Sudden trouble seeing in one or both eyes        * Sudden trouble walking, dizziness, loss of balance or coordination        * Sudden severe headache with no known cause      3. Importance of activation Emergency Medical Services ( 9-1-1 ) immediately if experience any warning signs of stroke.    4. Be sure and schedule a follow-up appointment with your primary care doctor or any specialists as instructed.     5. You must take medicine every day to treat your risk factors for stroke.  Be sure to take your medicines exactly as your doctor tells you: no more, no less.  Know what your medicines are for , what they do.  Anti-thrombotics /anticoagulants can help prevent strokes.  You are taking the following medicine(s)  Aspirin       6.  Smoking and second-hand smoke greatly increase your risk of stroke, cardiovascular disease and death. Smoking history never    7. Information provided was [facility] Emergency Stroke Education Binder    8. Documentation of teaching completed in Patient Education Activity and on Care Plan with teaching response noted?  yes

## 2024-10-18 NOTE — ED NOTES
TRANSFER - OUT REPORT:    Verbal report given to Becky virginia Crespo  being transferred to [facility] for routine progression of patient care       Report consisted of patient's Situation, Background, Assessment and   Recommendations(SBAR).     Information from the following report(s) Nurse Handoff Report, Index, ED Encounter Summary, ED SBAR, Adult Overview, Intake/Output, MAR, and Recent Results was reviewed with the receiving nurse.    [place] Fall Assessment:                           Lines:       Opportunity for questions and clarification was provided.      Patient transported with:  Registered Nurse

## 2024-10-18 NOTE — ED TRIAGE NOTES
Patient's wife called called EMS for left sided weakness and left facial droop.  EMS took BS upon arrival and found it to be 67.    Symptoms have resolved in route per EMS   Patient has a pacemaker and HR periodically in 30's in route.      Patient is blind   Dr. Hdz assessing in room.    Symptoms started at 1100    Code Stroke Level: 1  Signs and symptoms: see above   Code Stroke activation time: 1245  Provider at bedside time:  Wilder KHAN score: Negative  Last Known Well (Time): 1100  Blood Glucose Result/Time: 71, 1242  Blood Pressure: 120/61  Anticoagulants (List medications): Xarelto

## 2024-10-18 NOTE — ED PROVIDER NOTES
[facility] EMERGENCY DEPT  EMERGENCY DEPARTMENT ENCOUNTER      Pt Name: Panchito Crespo  MRN: [id number]  Birthdate 8/21/1933  Date of evaluation: 10/18/2024  Provider: Corbin Hdz MD      HISTORY OF PRESENT ILLNESS      [facility]  89-year-old man with a past medical history of SVT/a flutter, hypertension, hyperlipidemia, pacemaker on Xarelto who presents to the emergency department due to concerns for slurred speech and facial droop.  Last known well was 11 AM.  Patient and his wife were going to get ready for her birthday party when she noticed that he was very drowsy in the left side of his lower face was droopy.  She said his speech was slurred.  He became diaphoretic and she called EMS.  EMS noted a blood sugar of 67.  His symptoms resolved and route.  No glucose was given.  Patient has no complaints at this time.  Denies headache.  Wife states he has been in his normal state of health recently.      Nursing Notes were reviewed.    REVIEW OF SYSTEMS         Review of Systems  All systems reviewed were negative less otherwise document in the [facility]      PAST MEDICAL HISTORY     Past Medical History:   Diagnosis Date    Arthritis     Hypercholesterolemia     Hypertension     SVT (supraventricular tachycardia) (HCC)          SURGICAL HISTORY       Past Surgical History:   Procedure Laterality Date    CARDIAC SURGERY N/A 3/13/2019    ABLATION A-FLUTTER performed by Corbin Stubbs MD at [facility] CARDIAC CATH LAB    EP DEVICE PROCEDURE N/A 5/11/2023    Remove & replace ICD biv multi leads performed by Corbin Stubbs MD at [facility] CARDIAC CATH LAB    EP DEVICE PROCEDURE N/A 5/11/2023    Lv lead placement performed by Corbin Stubbs MD at [facility] CARDIAC CATH LAB    ICAR CATHETER ABLATION ARRHYTHMIA ADD ON N/A 3/13/2019    Ablation Svt/Vt Add On performed by Corbin Stubbs MD at [facility] CARDIAC CATH LAB    INS NEW/RPLCMT PRM PM W/TRANSV ELTRD ATRIAL&VENT  1/3/2019         INTRACARDIAC ELECTROPHYSIOLOGIC 3D MAPPING N/A 3/13/2019    Ep 3d Mapping

## 2024-10-18 NOTE — H&P
No      No family history on file.     Allergies   Allergen Reactions    Codeine Hallucinations    Atorvastatin Itching and Rash    Morphine Nausea And Vomiting    Oxycodone Rash    Oxycodone-Acetaminophen Rash        Prior to Admission medications    Medication Sig Start Date End Date Taking? Authorizing Provider   losartan (COZAAR) 25 MG tablet Take 1 tablet by mouth once daily 11/8/23   Corbin Stubbs MD   Multiple Vitamins-Minerals (PRESERVISION AREDS PO) Take by mouth 2 times daily    ProviderAngelito MD   brimonidine (ALPHAGAN) 0.2 % ophthalmic solution Apply 1 drop to eye 2 times daily    Automatic Reconciliation, Ar   dorzolamide (TRUSOPT) 2 % ophthalmic solution INSTILL 1 DROP  2 TIMES EVERY DAY INTO RIGHT EYE 8/4/20   Automatic Reconciliation, Ar   latanoprost (XALATAN) 0.005 % ophthalmic solution Apply 1 drop to eye    Automatic Reconciliation, Ar   metoprolol succinate (TOPROL XL) 50 MG extended release tablet Take 1 tablet by mouth daily 3/29/21   Automatic Reconciliation, Ar   omeprazole (PRILOSEC) 20 MG delayed release capsule Take 1 capsule by mouth daily    Automatic Reconciliation, Ar   rivaroxaban (XARELTO) 20 MG TABS tablet Take 1 tablet by mouth once daily 12/9/20   Automatic Reconciliation, Ar   rosuvastatin (CRESTOR) 10 MG tablet Take 0.5 tablets by mouth daily    Automatic Reconciliation, Ar       REVIEW OF SYSTEMS:  See HPI for details   12+ ROS reviewed with patient and negative with exception of above       Objective:   VITALS:    Vitals:    10/18/24 1445   BP: (!) 119/56   Pulse: 74   Resp: 19   Temp:    SpO2: 94%     PHYSICAL EXAM:    Physical Exam:     Physical Exam  Constitutional:       General: He is not in acute distress.     Appearance: He is not ill-appearing, toxic-appearing or diaphoretic.   HENT:      Head: Normocephalic and atraumatic.      Right Ear: External ear normal.      Left Ear: External ear normal.      Ears:      Comments: Hard of hearing     Nose: Nose normal.

## 2024-10-19 ENCOUNTER — APPOINTMENT (OUTPATIENT)
Facility: HOSPITAL | Age: 89
DRG: 069 | End: 2024-10-19
Attending: STUDENT IN AN ORGANIZED HEALTH CARE EDUCATION/TRAINING PROGRAM
Payer: MEDICARE

## 2024-10-19 LAB
ANION GAP SERPL CALC-SCNC: 4 MMOL/L (ref 2–12)
APPEARANCE UR: CLEAR
BACTERIA URNS QL MICRO: NEGATIVE /HPF
BILIRUB UR QL: NEGATIVE
BUN SERPL-MCNC: 18 MG/DL (ref 6–20)
BUN/CREAT SERPL: 16 (ref 12–20)
CALCIUM SERPL-MCNC: 8.1 MG/DL (ref 8.5–10.1)
CHLORIDE SERPL-SCNC: 111 MMOL/L (ref 97–108)
CHOLEST SERPL-MCNC: 140 MG/DL
CO2 SERPL-SCNC: 24 MMOL/L (ref 21–32)
COLOR UR: NORMAL
CREAT SERPL-MCNC: 1.12 MG/DL (ref 0.7–1.3)
ECHO AO ARCH DIAM: 3 CM
ECHO AO ROOT DIAM: 2.8 CM
ECHO AO ROOT INDEX: 1.37 CM/M2
ECHO AV AREA PEAK VELOCITY: 0.9 CM2
ECHO AV AREA VTI: 0.8 CM2
ECHO AV AREA/BSA PEAK VELOCITY: 0.4 CM2/M2
ECHO AV AREA/BSA VTI: 0.4 CM2/M2
ECHO AV MEAN GRADIENT: 47 MMHG
ECHO AV MEAN VELOCITY: 2.7 M/S
ECHO AV PEAK GRADIENT: 59 MMHG
ECHO AV PEAK VELOCITY: 3.2 M/S
ECHO AV VELOCITY RATIO: 0.22
ECHO AV VTI: 75.3 CM
ECHO BSA: 2.06 M2
ECHO EST RA PRESSURE: 3 MMHG
ECHO LA DIAMETER INDEX: 2.11 CM/M2
ECHO LA DIAMETER: 4.3 CM
ECHO LA TO AORTIC ROOT RATIO: 1.54
ECHO LV E' LATERAL VELOCITY: 6.8 CM/S
ECHO LV E' SEPTAL VELOCITY: 5.1 CM/S
ECHO LV EDV A2C: 66 ML
ECHO LV EDV A4C: 54 ML
ECHO LV EDV BP: 63 ML (ref 67–155)
ECHO LV EDV INDEX A4C: 26 ML/M2
ECHO LV EDV INDEX BP: 31 ML/M2
ECHO LV EDV NDEX A2C: 32 ML/M2
ECHO LV EF PHYSICIAN: 55 %
ECHO LV EJECTION FRACTION A2C: 59 %
ECHO LV EJECTION FRACTION A4C: 51 %
ECHO LV ESV A2C: 27 ML
ECHO LV ESV A4C: 27 ML
ECHO LV ESV BP: 28 ML (ref 22–58)
ECHO LV ESV INDEX A2C: 13 ML/M2
ECHO LV ESV INDEX A4C: 13 ML/M2
ECHO LV ESV INDEX BP: 14 ML/M2
ECHO LV FRACTIONAL SHORTENING: 15 % (ref 28–44)
ECHO LV INTERNAL DIMENSION DIASTOLE INDEX: 2.55 CM/M2
ECHO LV INTERNAL DIMENSION DIASTOLIC: 5.2 CM (ref 4.2–5.9)
ECHO LV INTERNAL DIMENSION SYSTOLIC INDEX: 2.16 CM/M2
ECHO LV INTERNAL DIMENSION SYSTOLIC: 4.4 CM
ECHO LV IVSD: 0.9 CM (ref 0.6–1)
ECHO LV MASS 2D: 169 G (ref 88–224)
ECHO LV MASS INDEX 2D: 82.8 G/M2 (ref 49–115)
ECHO LV POSTERIOR WALL DIASTOLIC: 0.9 CM (ref 0.6–1)
ECHO LV RELATIVE WALL THICKNESS RATIO: 0.35
ECHO LVOT AREA: 4.2 CM2
ECHO LVOT AV VTI INDEX: 0.21
ECHO LVOT DIAM: 2.3 CM
ECHO LVOT MEAN GRADIENT: 1 MMHG
ECHO LVOT PEAK GRADIENT: 2 MMHG
ECHO LVOT PEAK VELOCITY: 0.7 M/S
ECHO LVOT STROKE VOLUME INDEX: 32.4 ML/M2
ECHO LVOT SV: 66 ML
ECHO LVOT VTI: 15.9 CM
ECHO MV A VELOCITY: 0.56 M/S
ECHO MV AREA VTI: 2.8 CM2
ECHO MV E DECELERATION TIME (DT): 202.6 MS
ECHO MV E VELOCITY: 0.85 M/S
ECHO MV E/A RATIO: 1.52
ECHO MV E/E' LATERAL: 12.5
ECHO MV E/E' RATIO (AVERAGED): 14.58
ECHO MV E/E' SEPTAL: 16.67
ECHO MV LVOT VTI INDEX: 1.48
ECHO MV MAX VELOCITY: 1 M/S
ECHO MV MEAN GRADIENT: 1 MMHG
ECHO MV MEAN VELOCITY: 0.5 M/S
ECHO MV PEAK GRADIENT: 4 MMHG
ECHO MV REGURGITANT PEAK GRADIENT: 46 MMHG
ECHO MV REGURGITANT PEAK VELOCITY: 3.4 M/S
ECHO MV VTI: 23.6 CM
ECHO PV MAX VELOCITY: 1.3 M/S
ECHO PV PEAK GRADIENT: 6 MMHG
ECHO RIGHT VENTRICULAR SYSTOLIC PRESSURE (RVSP): 28 MMHG
ECHO RV FREE WALL PEAK S': 6.2 CM/S
ECHO RV TAPSE: 1.9 CM (ref 1.7–?)
ECHO RVOT MEAN GRADIENT: 0 MMHG
ECHO RVOT PEAK GRADIENT: 1 MMHG
ECHO RVOT PEAK VELOCITY: 0.5 M/S
ECHO RVOT VTI: 8.8 CM
ECHO TV REGURGITANT MAX VELOCITY: 2.52 M/S
ECHO TV REGURGITANT PEAK GRADIENT: 25 MMHG
EKG ATRIAL RATE: 87 BPM
EKG DIAGNOSIS: NORMAL
EKG Q-T INTERVAL: 488 MS
EKG QRS DURATION: 134 MS
EKG QTC CALCULATION (BAZETT): 530 MS
EKG R AXIS: 57 DEGREES
EKG T AXIS: 59 DEGREES
EKG VENTRICULAR RATE: 71 BPM
EPITH CASTS URNS QL MICRO: NORMAL /LPF
ERYTHROCYTE [DISTWIDTH] IN BLOOD BY AUTOMATED COUNT: 14.3 % (ref 11.5–14.5)
EST. AVERAGE GLUCOSE BLD GHB EST-MCNC: 111 MG/DL
GLUCOSE SERPL-MCNC: 78 MG/DL (ref 65–100)
GLUCOSE UR STRIP.AUTO-MCNC: NEGATIVE MG/DL
HBA1C MFR BLD: 5.5 % (ref 4–5.6)
HCT VFR BLD AUTO: 39.8 % (ref 36.6–50.3)
HDLC SERPL-MCNC: 40 MG/DL
HDLC SERPL: 3.5 (ref 0–5)
HGB BLD-MCNC: 12.9 G/DL (ref 12.1–17)
HGB UR QL STRIP: NEGATIVE
HYALINE CASTS URNS QL MICRO: NORMAL /LPF (ref 0–2)
INR PPP: 1.2 (ref 0.9–1.1)
KETONES UR QL STRIP.AUTO: NEGATIVE MG/DL
LDLC SERPL CALC-MCNC: 76.8 MG/DL (ref 0–100)
LEUKOCYTE ESTERASE UR QL STRIP.AUTO: NEGATIVE
MCH RBC QN AUTO: 29.7 PG (ref 26–34)
MCHC RBC AUTO-ENTMCNC: 32.4 G/DL (ref 30–36.5)
MCV RBC AUTO: 91.5 FL (ref 80–99)
NITRITE UR QL STRIP.AUTO: NEGATIVE
NRBC # BLD: 0 K/UL (ref 0–0.01)
NRBC BLD-RTO: 0 PER 100 WBC
PH UR STRIP: 6 (ref 5–8)
PLATELET # BLD AUTO: 142 K/UL (ref 150–400)
PMV BLD AUTO: 12.5 FL (ref 8.9–12.9)
POTASSIUM SERPL-SCNC: 4.1 MMOL/L (ref 3.5–5.1)
PROT UR STRIP-MCNC: NEGATIVE MG/DL
PROTHROMBIN TIME: 12.4 SEC (ref 9–11.1)
RBC # BLD AUTO: 4.35 M/UL (ref 4.1–5.7)
RBC #/AREA URNS HPF: NORMAL /HPF (ref 0–5)
SODIUM SERPL-SCNC: 139 MMOL/L (ref 136–145)
SP GR UR REFRACTOMETRY: 1.02 (ref 1–1.03)
SPECIMEN HOLD: NORMAL
TRIGL SERPL-MCNC: 116 MG/DL
UFH PPP CHRO-ACNC: 0.99 IU/ML
UROBILINOGEN UR QL STRIP.AUTO: 1 EU/DL (ref 0.2–1)
VLDLC SERPL CALC-MCNC: 23.2 MG/DL
WBC # BLD AUTO: 6 K/UL (ref 4.1–11.1)
WBC URNS QL MICRO: NORMAL /HPF (ref 0–4)

## 2024-10-19 PROCEDURE — 83036 HEMOGLOBIN GLYCOSYLATED A1C: CPT

## 2024-10-19 PROCEDURE — 97535 SELF CARE MNGMENT TRAINING: CPT

## 2024-10-19 PROCEDURE — 85027 COMPLETE CBC AUTOMATED: CPT

## 2024-10-19 PROCEDURE — 6370000000 HC RX 637 (ALT 250 FOR IP): Performed by: INTERNAL MEDICINE

## 2024-10-19 PROCEDURE — 93306 TTE W/DOPPLER COMPLETE: CPT | Performed by: SPECIALIST

## 2024-10-19 PROCEDURE — 85520 HEPARIN ASSAY: CPT

## 2024-10-19 PROCEDURE — 97161 PT EVAL LOW COMPLEX 20 MIN: CPT

## 2024-10-19 PROCEDURE — 80061 LIPID PANEL: CPT

## 2024-10-19 PROCEDURE — 36415 COLL VENOUS BLD VENIPUNCTURE: CPT

## 2024-10-19 PROCEDURE — 97530 THERAPEUTIC ACTIVITIES: CPT

## 2024-10-19 PROCEDURE — 6370000000 HC RX 637 (ALT 250 FOR IP): Performed by: NURSE PRACTITIONER

## 2024-10-19 PROCEDURE — 6370000000 HC RX 637 (ALT 250 FOR IP): Performed by: STUDENT IN AN ORGANIZED HEALTH CARE EDUCATION/TRAINING PROGRAM

## 2024-10-19 PROCEDURE — 6370000000 HC RX 637 (ALT 250 FOR IP)

## 2024-10-19 PROCEDURE — 2060000000 HC ICU INTERMEDIATE R&B

## 2024-10-19 PROCEDURE — 6370000000 HC RX 637 (ALT 250 FOR IP): Performed by: PSYCHIATRY & NEUROLOGY

## 2024-10-19 PROCEDURE — 94761 N-INVAS EAR/PLS OXIMETRY MLT: CPT

## 2024-10-19 PROCEDURE — 97165 OT EVAL LOW COMPLEX 30 MIN: CPT

## 2024-10-19 PROCEDURE — 2580000003 HC RX 258: Performed by: STUDENT IN AN ORGANIZED HEALTH CARE EDUCATION/TRAINING PROGRAM

## 2024-10-19 PROCEDURE — 92610 EVALUATE SWALLOWING FUNCTION: CPT

## 2024-10-19 PROCEDURE — 99223 1ST HOSP IP/OBS HIGH 75: CPT | Performed by: PSYCHIATRY & NEUROLOGY

## 2024-10-19 PROCEDURE — 93306 TTE W/DOPPLER COMPLETE: CPT

## 2024-10-19 PROCEDURE — 80048 BASIC METABOLIC PNL TOTAL CA: CPT

## 2024-10-19 PROCEDURE — 93010 ELECTROCARDIOGRAM REPORT: CPT | Performed by: SPECIALIST

## 2024-10-19 PROCEDURE — 85610 PROTHROMBIN TIME: CPT

## 2024-10-19 RX ORDER — METOPROLOL SUCCINATE 50 MG/1
50 TABLET, EXTENDED RELEASE ORAL DAILY
Status: DISCONTINUED | OUTPATIENT
Start: 2024-10-19 | End: 2024-10-22 | Stop reason: HOSPADM

## 2024-10-19 RX ORDER — ROSUVASTATIN CALCIUM 10 MG/1
10 TABLET, COATED ORAL NIGHTLY
Status: DISCONTINUED | OUTPATIENT
Start: 2024-10-19 | End: 2024-10-22 | Stop reason: HOSPADM

## 2024-10-19 RX ORDER — ACETAMINOPHEN 325 MG/1
650 TABLET ORAL ONCE
Status: COMPLETED | OUTPATIENT
Start: 2024-10-19 | End: 2024-10-19

## 2024-10-19 RX ORDER — LIDOCAINE 4 G/G
1 PATCH TOPICAL DAILY PRN
Status: DISCONTINUED | OUTPATIENT
Start: 2024-10-19 | End: 2024-10-22 | Stop reason: HOSPADM

## 2024-10-19 RX ORDER — LOSARTAN POTASSIUM 25 MG/1
25 TABLET ORAL DAILY
Status: DISCONTINUED | OUTPATIENT
Start: 2024-10-19 | End: 2024-10-22 | Stop reason: HOSPADM

## 2024-10-19 RX ADMIN — LOSARTAN POTASSIUM 25 MG: 25 TABLET, FILM COATED ORAL at 14:27

## 2024-10-19 RX ADMIN — ACETAMINOPHEN 650 MG: 325 TABLET ORAL at 00:59

## 2024-10-19 RX ADMIN — ROSUVASTATIN CALCIUM 10 MG: 10 TABLET, FILM COATED ORAL at 20:38

## 2024-10-19 RX ADMIN — METOPROLOL SUCCINATE 50 MG: 50 TABLET, EXTENDED RELEASE ORAL at 14:27

## 2024-10-19 RX ADMIN — SODIUM CHLORIDE, PRESERVATIVE FREE 10 ML: 5 INJECTION INTRAVENOUS at 20:38

## 2024-10-19 RX ADMIN — PANTOPRAZOLE SODIUM 40 MG: 40 TABLET, DELAYED RELEASE ORAL at 06:22

## 2024-10-19 RX ADMIN — ASPIRIN 81 MG: 81 TABLET, CHEWABLE ORAL at 08:52

## 2024-10-19 RX ADMIN — RIVAROXABAN 20 MG: 20 TABLET, FILM COATED ORAL at 08:52

## 2024-10-19 RX ADMIN — SODIUM CHLORIDE, PRESERVATIVE FREE 10 ML: 5 INJECTION INTRAVENOUS at 09:05

## 2024-10-19 ASSESSMENT — PAIN DESCRIPTION - ORIENTATION
ORIENTATION: POSTERIOR
ORIENTATION: RIGHT

## 2024-10-19 ASSESSMENT — PAIN DESCRIPTION - LOCATION
LOCATION: NECK
LOCATION: KNEE

## 2024-10-19 ASSESSMENT — PAIN SCALES - GENERAL
PAINLEVEL_OUTOF10: 5
PAINLEVEL_OUTOF10: 4

## 2024-10-19 ASSESSMENT — PAIN DESCRIPTION - DESCRIPTORS: DESCRIPTORS: ACHING

## 2024-10-19 NOTE — CARE COORDINATION
10/19/2024  3:39 PM      Care Management Initial Assessment  10/19/2024 3:39 PM  If patient is discharged prior to next notation, then this note serves as note for discharge by case management.    Reason for Admission:   Facial droop [R29.810]  Hypothermia, initial encounter [T68.XXXA]  Cerebrovascular accident (CVA), unspecified mechanism (HCC) [I63.9]         Patient Admission Status: Inpatient  Date Admitted to [place]: 10/18/24  RUR: Readmission Risk Score: 12.4    Hospitalization in the last 30 days (Readmission):  No        Advance Care Planning:  Code Status: Full Code  Primary Healthcare Decision Maker: (P) Legal Next of Kin (Sneha Crespo spouse (H) [phone])   Advance Directive: has an advanced directive - a copy HAS NOT been provided.     __________________________________________________________________________  Assessment:      10/19/24 1533   Service Assessment   Patient Orientation Alert and Oriented   Cognition Alert   History Provided By Patient;Spouse   Primary Caregiver Self   Support Systems Spouse/Significant Other;Children   Patient's Healthcare Decision Maker is: Legal Next of Kin  (Sneha Crespo spouse (H) [phone])   PCP Verified by CM Yes  (CARLOS Turner)   Last Visit to PCP Within last 3 months   Prior Functional Level Independent in ADLs/IADLs;Assistance with the following:;Other (see comment)  (wife assists with medications)   Current Functional Level Assistance with the following:;Housework;Shopping;Mobility;Cooking   Can patient return to prior living arrangement Yes   Ability to make needs known: Good   Family able to assist with home care needs: Yes   Financial Resources Medicare;Other (Comment)  (Medicare A/B, [employer])   Social/Functional History   Lives With Spouse   Type of Home House   Home Layout Two level;Able to Live on Main level with bedroom/bathroom   Home Access Stairs to enter with rails   Entrance Stairs - Number of Steps 5 through garage, 7 @

## 2024-10-19 NOTE — CARE COORDINATION
10/19/2024  11:28 AM  Pt admitted for R/O CVA, neuro work in process.  CM consult for TIA/stroke, order completed.  CM will complete assessment and follow for DC needs.  PERCY Vasquez

## 2024-10-19 NOTE — CARE COORDINATION
10/19/2024  5:01 PM     10/19/24 8589   Service Assessment   Patient Orientation Alert and Oriented   Discharge Planning   Patient expects to be discharged to: House   Services At/After Discharge   Transition of Care Consult (CM Consult) Home Health   Internal Home Health No   Reason Outside Agency Chosen Out of service area   Condition of Participation: Discharge Planning   The Plan for Transition of Care is related to the following treatment goals: TIA/ Home Health   The Patient and/or Patient Representative was provided with a Choice of Provider? Patient Representative   Name of the Patient Representative who was provided with the Choice of Provider and agrees with the Discharge Plan?  spouse Sneha Crespo   The Patient and/Or Patient Representative agree with the Discharge Plan? (S)  Yes   Freedom of Choice list was provided with basic dialogue that supports the patient's individualized plan of care/goals, treatment preferences, and shares the quality data associated with the providers?  (S)  Yes     CM consult for HH, CM met w/ pt, spouse at bedside, agreeable to HH at [place]  FOC offered, prefers [facility]  Referral sent in Care Port to [facility]   Await response  PERCY Vasquez

## 2024-10-19 NOTE — CONSULTS
INPATIENT NEUROLOGY CONSULT NOTE    NAME:     Panchito Crespo    ADMIT DATE:    10/18/2024 12:43 PM    CONSULT DATE:  10/19/24    REQUESTING PROVIDER: Tyron Mcgregor MD     HPI: 89 y.o. male who  has a past medical history of Arthritis, Hypercholesterolemia, Hypertension, and SVT (supraventricular tachycardia) (HCC). Hx A Flutter (on Xarelto), s/p pacemaker.    Neurology is asked to evaluate to patient for: CVA    Pertinent home meds include Crestor 10 mg half tablet daily, Xarelto 20 mg 1 tablet daily    Hx from discussion with Wife at bedside.  Says yesterday, they were getting ready to go to a Birthday event for her and she gave him a shirt to put on.  She came back a few mins later and noticed he hadn't changed. Pt was sitting in a chair and she says his speech was slurred and had left facial droop. Pt is resting in bed currently, multiple family/ friends in room.  He has been ambulating with PT/ OT today. Family/ friends all agree that the facial droop/ slurred speech is resolved.     Labs reviewed    Most recent INR 1.2 (previous 1.5)  Total cholesterol 140, triglyceride 116, HDL 40, LDL 76.8  A1c 5.5  Most recent CBC with normal WBC, H&H; platelet count mildly low at 142,000  Most recent BMP with normal NA, K, CR, GFR  UA negative for UTI    CTA Head/ Neck 10-:  FINDINGS:  Delayed contrast-enhanced head CT:  The ventricles are midline without hydrocephalus.  There is no acute intra or extra-axial hemorrhage. Mild bilateral subcortical and periventricular areas of patchy low attenuation is nonspecific but likely related to changes of chronic small vessel ischemic disease. Cerebellar and basal ganglia calcifications. Nonspecific 8 mm hyperdense focus in the anterior tracy (4-11). Mucosal thickening in the ethmoidal air cells.  The paranasal sinuses are clear. Chronic appearing deformity of the left orbital globe. CTA NECK:  Great vessels: Patent great vessel origins Right subclavian artery: Patent  Left

## 2024-10-20 PROCEDURE — 2580000003 HC RX 258: Performed by: STUDENT IN AN ORGANIZED HEALTH CARE EDUCATION/TRAINING PROGRAM

## 2024-10-20 PROCEDURE — 6370000000 HC RX 637 (ALT 250 FOR IP): Performed by: INTERNAL MEDICINE

## 2024-10-20 PROCEDURE — 1100000000 HC RM PRIVATE

## 2024-10-20 PROCEDURE — 6370000000 HC RX 637 (ALT 250 FOR IP): Performed by: PSYCHIATRY & NEUROLOGY

## 2024-10-20 PROCEDURE — 6370000000 HC RX 637 (ALT 250 FOR IP): Performed by: STUDENT IN AN ORGANIZED HEALTH CARE EDUCATION/TRAINING PROGRAM

## 2024-10-20 RX ADMIN — ASPIRIN 81 MG: 81 TABLET, CHEWABLE ORAL at 09:41

## 2024-10-20 RX ADMIN — METOPROLOL SUCCINATE 50 MG: 50 TABLET, EXTENDED RELEASE ORAL at 09:41

## 2024-10-20 RX ADMIN — PANTOPRAZOLE SODIUM 40 MG: 40 TABLET, DELAYED RELEASE ORAL at 06:16

## 2024-10-20 RX ADMIN — ROSUVASTATIN CALCIUM 10 MG: 10 TABLET, FILM COATED ORAL at 21:48

## 2024-10-20 RX ADMIN — LOSARTAN POTASSIUM 25 MG: 25 TABLET, FILM COATED ORAL at 09:41

## 2024-10-20 RX ADMIN — SODIUM CHLORIDE, PRESERVATIVE FREE 10 ML: 5 INJECTION INTRAVENOUS at 21:48

## 2024-10-20 RX ADMIN — SODIUM CHLORIDE, PRESERVATIVE FREE 10 ML: 5 INJECTION INTRAVENOUS at 09:40

## 2024-10-20 RX ADMIN — RIVAROXABAN 20 MG: 20 TABLET, FILM COATED ORAL at 09:41

## 2024-10-20 ASSESSMENT — PAIN SCALES - GENERAL
PAINLEVEL_OUTOF10: 0

## 2024-10-21 ENCOUNTER — CLINICAL DOCUMENTATION (OUTPATIENT)
Age: 89
End: 2024-10-21

## 2024-10-21 PROCEDURE — 2580000003 HC RX 258: Performed by: STUDENT IN AN ORGANIZED HEALTH CARE EDUCATION/TRAINING PROGRAM

## 2024-10-21 PROCEDURE — 1100000000 HC RM PRIVATE

## 2024-10-21 PROCEDURE — 97112 NEUROMUSCULAR REEDUCATION: CPT

## 2024-10-21 PROCEDURE — 6370000000 HC RX 637 (ALT 250 FOR IP): Performed by: INTERNAL MEDICINE

## 2024-10-21 PROCEDURE — 6370000000 HC RX 637 (ALT 250 FOR IP): Performed by: STUDENT IN AN ORGANIZED HEALTH CARE EDUCATION/TRAINING PROGRAM

## 2024-10-21 PROCEDURE — 6370000000 HC RX 637 (ALT 250 FOR IP): Performed by: PSYCHIATRY & NEUROLOGY

## 2024-10-21 PROCEDURE — 94761 N-INVAS EAR/PLS OXIMETRY MLT: CPT

## 2024-10-21 RX ADMIN — LOSARTAN POTASSIUM 25 MG: 25 TABLET, FILM COATED ORAL at 07:59

## 2024-10-21 RX ADMIN — SODIUM CHLORIDE, PRESERVATIVE FREE 10 ML: 5 INJECTION INTRAVENOUS at 08:00

## 2024-10-21 RX ADMIN — PANTOPRAZOLE SODIUM 40 MG: 40 TABLET, DELAYED RELEASE ORAL at 07:07

## 2024-10-21 RX ADMIN — RIVAROXABAN 20 MG: 20 TABLET, FILM COATED ORAL at 07:59

## 2024-10-21 RX ADMIN — ROSUVASTATIN CALCIUM 10 MG: 10 TABLET, FILM COATED ORAL at 20:38

## 2024-10-21 RX ADMIN — SODIUM CHLORIDE, PRESERVATIVE FREE 10 ML: 5 INJECTION INTRAVENOUS at 20:38

## 2024-10-21 RX ADMIN — METOPROLOL SUCCINATE 50 MG: 50 TABLET, EXTENDED RELEASE ORAL at 07:59

## 2024-10-21 RX ADMIN — ASPIRIN 81 MG: 81 TABLET, CHEWABLE ORAL at 07:59

## 2024-10-21 ASSESSMENT — PAIN SCALES - GENERAL
PAINLEVEL_OUTOF10: 0

## 2024-10-21 NOTE — CARE COORDINATION
Care Management Progress Note        Reason for Admission:   Facial droop [R29.810]  Hypothermia, initial encounter [T68.XXXA]  Cerebrovascular accident (CVA), unspecified mechanism (HCC) [I63.9]         Patient Admission Status: Inpatient  RUR: 12%  Hospitalization in the last 30 days (Readmission):  No        Transition of care plan:  Patient was discussed in IDR and continues to be medically managed. MRI is pending.    Dispo: return home.     [facility]. HH contact information has been placed on pt's AVS.     Discharge plan communicated with patient and/or discharge caregiver: Yes . CM has updated patient and patient's wife at bedside today Monday 10/21/24. Wife remains in agreement with dc plan.     Date 1st IMM letter given: 10/18/24. 2nd IMM: 10/21/24.    Outpatient follow-up.    Transport at discharge: family will provide transport at discharge.        ___________________________________________   Ute Gomez RN Case Manager  10/21/2024   1:55 PM        Passed

## 2024-10-22 ENCOUNTER — APPOINTMENT (OUTPATIENT)
Facility: HOSPITAL | Age: 89
DRG: 069 | End: 2024-10-22
Payer: MEDICARE

## 2024-10-22 VITALS
HEART RATE: 62 BPM | BODY MASS INDEX: 27.06 KG/M2 | SYSTOLIC BLOOD PRESSURE: 140 MMHG | OXYGEN SATURATION: 96 % | RESPIRATION RATE: 16 BRPM | DIASTOLIC BLOOD PRESSURE: 87 MMHG | TEMPERATURE: 98.1 F | WEIGHT: 189 LBS | HEIGHT: 70 IN

## 2024-10-22 PROCEDURE — 6370000000 HC RX 637 (ALT 250 FOR IP): Performed by: INTERNAL MEDICINE

## 2024-10-22 PROCEDURE — 70450 CT HEAD/BRAIN W/O DYE: CPT

## 2024-10-22 PROCEDURE — 2580000003 HC RX 258: Performed by: STUDENT IN AN ORGANIZED HEALTH CARE EDUCATION/TRAINING PROGRAM

## 2024-10-22 PROCEDURE — 6370000000 HC RX 637 (ALT 250 FOR IP): Performed by: STUDENT IN AN ORGANIZED HEALTH CARE EDUCATION/TRAINING PROGRAM

## 2024-10-22 RX ORDER — ASPIRIN 81 MG/1
81 TABLET, CHEWABLE ORAL DAILY
Qty: 30 TABLET | Refills: 3 | Status: SHIPPED | OUTPATIENT
Start: 2024-10-23

## 2024-10-22 RX ADMIN — PANTOPRAZOLE SODIUM 40 MG: 40 TABLET, DELAYED RELEASE ORAL at 06:35

## 2024-10-22 RX ADMIN — RIVAROXABAN 20 MG: 20 TABLET, FILM COATED ORAL at 08:04

## 2024-10-22 RX ADMIN — SODIUM CHLORIDE, PRESERVATIVE FREE 10 ML: 5 INJECTION INTRAVENOUS at 08:05

## 2024-10-22 RX ADMIN — ASPIRIN 81 MG: 81 TABLET, CHEWABLE ORAL at 08:04

## 2024-10-22 RX ADMIN — LOSARTAN POTASSIUM 25 MG: 25 TABLET, FILM COATED ORAL at 08:04

## 2024-10-22 RX ADMIN — METOPROLOL SUCCINATE 50 MG: 50 TABLET, EXTENDED RELEASE ORAL at 08:04

## 2024-10-22 NOTE — PROGRESS NOTES
JEFFERY GIBBONS [facility]  [address] [place] [address] [phone]        Hospitalist Progress Note      NAME: Panchito Crespo   :  1933  MRM:  [id number]    Date/Time of service: 10/21/2024  1:29 PM       Subjective:     Chief Complaint:  Patient was personally seen and examined by me during this time period.  Chart reviewed.  F/up TIA    Feeling well. Back to baseline       Objective:       Vitals:       Last 24hrs VS reviewed since prior progress note. Most recent are:    Vitals:    10/21/24 1213   BP: 101/61   Pulse: 70   Resp: 18   Temp: 98.1 °F (36.7 °C)   SpO2: 95%     SpO2 Readings from Last 6 Encounters:   10/21/24 95%   10/02/24 98%   24 98%   09/15/23 96%   23 95%        No intake or output data in the 24 hours ending 10/21/24 1329       Exam:     General : alert x 3, awake, no acute distress,   HEENT: EOMI, moist mucus membrane, TM clear  Neck: supple, no JVD, no meningeal signs  Chest: Clear to auscultation bilaterally   CVS: S1 S2 heard, Capillary refill less than 2 seconds  Abd: soft/ non tender, non distended, BS physiological,   Ext: no clubbing, no cyanosis, no edema, brisk 2+ DP pulses  Neuro/Psych: pleasant mood and affect, CN 2-12 grossly intact, sensory grossly within normal limit, Moves all extremities,  Skin: warm       Medications Reviewed: (see below)    Lab Data Reviewed: (see below)    ______________________________________________________________________    Medications:     Current Facility-Administered Medications   Medication Dose Route Frequency    rosuvastatin (CRESTOR) tablet 10 mg  10 mg Oral Nightly    losartan (COZAAR) tablet 25 mg  25 mg Oral Daily    metoprolol succinate (TOPROL XL) extended release tablet 50 mg  50 mg Oral Daily    lidocaine 4 % external patch 1 patch  1 patch TransDERmal Daily PRN    sodium chloride flush 0.9 % injection 5-40 mL  5-40 mL IntraVENous 2 times per day    sodium chloride flush 0.9 % 
JEFFERY GIBBONS [facility]  [address] [place] [address] [phone]        Hospitalist Progress Note      NAME: Panchito Crespo   :  1933  MRM:  [id number]    Date/Time of service: 10/20/2024  2:13 PM       Subjective:     Chief Complaint:  Patient was personally seen and examined by me during this time period.  Chart reviewed.  F/up TIA    Feeling well. Back to baseline       Objective:       Vitals:       Last 24hrs VS reviewed since prior progress note. Most recent are:    Vitals:    10/20/24 1207   BP: (!) 140/83   Pulse: 73   Resp: 16   Temp: 97.9 °F (36.6 °C)   SpO2: 96%     SpO2 Readings from Last 6 Encounters:   10/20/24 96%   10/02/24 98%   24 98%   09/15/23 96%   23 95%          Intake/Output Summary (Last 24 hours) at 10/20/2024 1413  Last data filed at 10/20/2024 1111  Gross per 24 hour   Intake 480 ml   Output 300 ml   Net 180 ml        Exam:     General : alert x 3, awake, no acute distress,   HEENT: EOMI, moist mucus membrane, TM clear  Neck: supple, no JVD, no meningeal signs  Chest: Clear to auscultation bilaterally   CVS: S1 S2 heard, Capillary refill less than 2 seconds  Abd: soft/ non tender, non distended, BS physiological,   Ext: no clubbing, no cyanosis, no edema, brisk 2+ DP pulses  Neuro/Psych: pleasant mood and affect, CN 2-12 grossly intact, sensory grossly within normal limit, Moves all extremities,  Skin: warm       Medications Reviewed: (see below)    Lab Data Reviewed: (see below)    ______________________________________________________________________    Medications:     Current Facility-Administered Medications   Medication Dose Route Frequency    rosuvastatin (CRESTOR) tablet 10 mg  10 mg Oral Nightly    losartan (COZAAR) tablet 25 mg  25 mg Oral Daily    metoprolol succinate (TOPROL XL) extended release tablet 50 mg  50 mg Oral Daily    lidocaine 4 % external patch 1 patch  1 patch TransDERmal Daily PRN    sodium chloride 
Nurse handed patient a copy of discharge instructions which have been read and explained to patient. . New medications read and explained. Patient gives verbal understanding. Patient aware that prescriptions have been electronically sent to pharmacy. Opportunity for questions and clarification offered. Removed patient's IV access with no complications, VS stable, and patient sent with all belongings.    
Physical Therapy Note:    Orders received, chart reviewed and patient evaluated by physical therapy. Recommend: Physical therapy at least 2 days/week in the home AND ensure assist and/or supervision for safety with mobility  at discharge.    Full evaluation to follow.     Fifi Barraza, PT, DPT, CEEAA    
Received MRI device management form.  Recheck check showed LV threshold 3V @ 0.5 ms, impedance 1125.  Contacted [employer] reps for in person check prior to signing off on MRI device mgmt form to make sure function is truly ok.    
Received follow up message from [facility] notify waiting on [employer] representative to set device for MRI.  
Received transfer order to [facility] surg telemetry, bed 419. Phone all report given to receiving nurse GALLITO Berger, reviewed reason for admission, history, events and plan. Wife and daughter x two at bedside. Copy of pace maker card sent to MRI and placed in chart, spoke with MRI tech who reported, \"this patients MRI likely to be done Tuesday\".  GALLITO Perez  
\"RBCF\"   No results for input(s): \"PH\", \"PCO2\", \"PO2\" in the last 72 hours.  No results for input(s): \"CPK\" in the last 72 hours.    Invalid input(s): \"CPKMB\", \"CKNDX\", \"TROIQ\"  Lab Results   Component Value Date/Time    CHOL 140 10/19/2024 05:09 AM    HDL 40 10/19/2024 05:09 AM    LDL 76.8 10/19/2024 05:09 AM     No results found for: \"GLUCPOC\"      Medications Reviewed:     Current Facility-Administered Medications   Medication Dose Route Frequency    rosuvastatin (CRESTOR) tablet 10 mg  10 mg Oral Nightly    sodium chloride flush 0.9 % injection 5-40 mL  5-40 mL IntraVENous 2 times per day    sodium chloride flush 0.9 % injection 5-40 mL  5-40 mL IntraVENous PRN    0.9 % sodium chloride infusion   IntraVENous PRN    ondansetron (ZOFRAN-ODT) disintegrating tablet 4 mg  4 mg Oral Q8H PRN    Or    ondansetron (ZOFRAN) injection 4 mg  4 mg IntraVENous Q6H PRN    polyethylene glycol (GLYCOLAX) packet 17 g  17 g Oral Daily PRN    aspirin chewable tablet 81 mg  81 mg Oral Daily    Or    aspirin suppository 300 mg  300 mg Rectal Daily    pantoprazole (PROTONIX) tablet 40 mg  40 mg Oral QAM AC    rivaroxaban (XARELTO) tablet 20 mg  20 mg Oral Daily with breakfast     ______________________________________________________________________  ACTUAL LENGTH OF STAY:          1                 Kay Castillo MD    
chair, Call bell within reach, Bed/ chair alarm activated, and Caregiver / family present    COMMUNICATION/EDUCATION:   The patient's plan of care was discussed with: physical therapist and registered nurse    Patient Education  Education Given To: Patient  Education Provided: Role of Therapy;Fall Prevention Strategies  Education Method: Demonstration;Verbal;Teach Back  Education Outcome: Verbalized understanding    Thank you for this referral.  Katie Galeana OT  Minutes: 12    Occupational Therapy Evaluation Charge Determination   History Examination Decision-Making   LOW Complexity : Brief history review  LOW Complexity: 1-3 Performance deficits relating to physical, cognitive, or psychosocial skills that result in activity limitations and/or participation restrictions LOW Complexity: No comorbidities that affect functional and  no verbal  or physical assist needed to complete eval tasks   Based on the above components, the patient evaluation is determined to be of the following complexity level: Low

## 2024-10-22 NOTE — DISCHARGE INSTRUCTIONS
HOSPITALIST DISCHARGE INSTRUCTIONS  NAME:  Panchito Crespo   :  1933   MRN:  [id number]     Date/Time:  10/22/2024 11:45 AM    ADMIT DATE: 10/18/2024     DISCHARGE DATE: 10/22/2024     DISCHARGE DIAGNOSIS:  ***    DISCHARGE INSTRUCTIONS:  Thank you for allowing us to participate in your care. Your discharging Hospitalist is Guero Marina MD. You were admitted for evaluation and treatment of the above. Your were seen by our neurology team and your symptoms improved. You are well enough to be discharged. Please take your meds as prescribed and follow up with your PCP, neurologist and a neurointerventional specialist.      MEDICATIONS:    It is important that you take the medication exactly as they are prescribed.   Keep your medication in the bottles provided by the pharmacist and keep a list of the medication names, dosages, and times to be taken in your wallet.   Do not take other medications without consulting your doctor.             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    [facility]-[address] [address] [phone]  Follow up  [phone], If no response from home health within 24 hours, please call agency directly for follow up.    Srinivasan Padilla PA  [address] [facility]

## 2024-10-22 NOTE — PLAN OF CARE
Problem: Chronic Conditions and Co-morbidities  Goal: Patient's chronic conditions and co-morbidity symptoms are monitored and maintained or improved  10/20/2024 1603 by Casey Belle RN  Outcome: Progressing  10/20/2024 0452 by Tiki Zraate RN  Outcome: Progressing  Flowsheets (Taken 10/19/2024 2044)  Care Plan - Patient's Chronic Conditions and Co-Morbidity Symptoms are Monitored and Maintained or Improved: Monitor and assess patient's chronic conditions and comorbid symptoms for stability, deterioration, or improvement     Problem: Discharge Planning  Goal: Discharge to home or other facility with appropriate resources  10/20/2024 1603 by Casey Belle RN  Outcome: Progressing  10/20/2024 0452 by Tiki Zarate RN  Outcome: Progressing  Flowsheets (Taken 10/19/2024 2044)  Discharge to home or other facility with appropriate resources:   Identify barriers to discharge with patient and caregiver   Identify discharge learning needs (meds, wound care, etc)     Problem: Safety - Adult  Goal: Free from fall injury  10/20/2024 1603 by Casey Belle RN  Outcome: Progressing  10/20/2024 0452 by Tiki Zarate RN  Outcome: Progressing     Problem: Pain  Goal: Verbalizes/displays adequate comfort level or baseline comfort level  Outcome: Progressing     
  Problem: Chronic Conditions and Co-morbidities  Goal: Patient's chronic conditions and co-morbidity symptoms are monitored and maintained or improved  10/22/2024 1314 by Yan Bennett RN  Outcome: Completed  10/22/2024 0254 by Mariam Robledo RN  Outcome: Progressing  Flowsheets (Taken 10/21/2024 2038)  Care Plan - Patient's Chronic Conditions and Co-Morbidity Symptoms are Monitored and Maintained or Improved: Monitor and assess patient's chronic conditions and comorbid symptoms for stability, deterioration, or improvement     Problem: Discharge Planning  Goal: Discharge to home or other facility with appropriate resources  10/22/2024 1314 by Yan Bennett RN  Outcome: Completed  10/22/2024 0254 by Mariam Robledo RN  Outcome: Progressing  Flowsheets (Taken 10/21/2024 2038)  Discharge to home or other facility with appropriate resources: Identify barriers to discharge with patient and caregiver     Problem: Safety - Adult  Goal: Free from fall injury  10/22/2024 1314 by Yan Bennett RN  Outcome: Completed  10/22/2024 0254 by Mariam Robledo RN  Outcome: Progressing  Flowsheets (Taken 10/21/2024 2350)  Free From Fall Injury: Instruct family/caregiver on patient safety     Problem: Pain  Goal: Verbalizes/displays adequate comfort level or baseline comfort level  10/22/2024 1314 by Yan Bennett RN  Outcome: Completed  10/22/2024 0254 by Mariam Robledo RN  Outcome: Progressing     Problem: ABCDS Injury Assessment  Goal: Absence of physical injury  10/22/2024 1314 by Yan Bennett RN  Outcome: Completed  10/22/2024 0254 by Mariam Robledo RN  Outcome: Progressing  Flowsheets (Taken 10/21/2024 2350)  Absence of Physical Injury: Implement safety measures based on patient assessment     
  Problem: Chronic Conditions and Co-morbidities  Goal: Patient's chronic conditions and co-morbidity symptoms are monitored and maintained or improved  Outcome: Progressing     Problem: Discharge Planning  Goal: Discharge to home or other facility with appropriate resources  Outcome: Progressing     Problem: Safety - Adult  Goal: Free from fall injury  Outcome: Progressing     Problem: Pain  Goal: Verbalizes/displays adequate comfort level or baseline comfort level  Outcome: Progressing     
  Problem: Chronic Conditions and Co-morbidities  Goal: Patient's chronic conditions and co-morbidity symptoms are monitored and maintained or improved  Outcome: Progressing  Flowsheets (Taken 10/21/2024 2038)  Care Plan - Patient's Chronic Conditions and Co-Morbidity Symptoms are Monitored and Maintained or Improved: Monitor and assess patient's chronic conditions and comorbid symptoms for stability, deterioration, or improvement     Problem: Discharge Planning  Goal: Discharge to home or other facility with appropriate resources  Outcome: Progressing  Flowsheets (Taken 10/21/2024 2038)  Discharge to home or other facility with appropriate resources: Identify barriers to discharge with patient and caregiver     Problem: Safety - Adult  Goal: Free from fall injury  Outcome: Progressing  Flowsheets (Taken 10/21/2024 2350)  Free From Fall Injury: Instruct family/caregiver on patient safety     Problem: Pain  Goal: Verbalizes/displays adequate comfort level or baseline comfort level  Outcome: Progressing     Problem: Physical Therapy - Adult  Goal: By Discharge: Performs mobility at highest level of function for planned discharge setting.  See evaluation for individualized goals.  Description: FUNCTIONAL STATUS PRIOR TO ADMISSION: Patient was independent and active without use of DME.    HOME SUPPORT PRIOR TO ADMISSION: The patient lived with spouse but did not require assist.    Physical Therapy Goals  Initiated 10/19/2024  1.  Patient will move from supine to sit and sit to supine  in bed with modified independence within 7 day(s).    2.  Patient will transfer from bed to chair and chair to bed with modified independence using the least restrictive device within 7 day(s).  3.  Patient will perform sit to stand with modified independence within 7 day(s).  4.  Patient will ambulate with modified independence for 300 feet with the least restrictive device within 7 day(s).   5.  Patient will ascend/descend 5 stairs with 
Speech LAnguage Pathology EVALUATION/DISCHARGE    Patient: Panchito Crespo (89 y.o. male)  Date: 10/19/2024  Primary Diagnosis: Facial droop [R29.810]  Hypothermia, initial encounter [T68.XXXA]  Cerebrovascular accident (CVA), unspecified mechanism (HCC) [I63.9]       Precautions:                     ASSESSMENT :  Patient presents with seemingly functional oropharyngeal swallow function. Oral mechanism revealed slight left sided facial droop. Previously noted slurred speech reportedly resolved per patient/patient's wife. Speech-language skills appear WNL. Patient sitting edge of bed feeding himself lunch upon SLP arrival. No overt clinical s/s aspiration noted across thin via cup, puree, or solid trials. SLP educated patient/wife on aspiration precautions. No further acute SLP service warranted at this time. SLP will sign off.     Patient will be discharged from skilled speech-language pathology services at this time.     PLAN :  Recommendations and Planned Interventions:  Diet: Regular and thin liquids  --Medications as tolerated   --Upright all PO intake   --Oral hygiene 2-3x/day         Acute SLP Services: No, patient will be discharged from acute skilled speech-language pathology at this time.  Discharge Recommendations: No, additional SLP treatment not indicated at discharge     SUBJECTIVE:   Patient stated, “I feel like my mind is good.” Wife present at bedside and reports speech-language skills are baseline.     OBJECTIVE:     Past Medical History:   Diagnosis Date    Arthritis     Hypercholesterolemia     Hypertension     SVT (supraventricular tachycardia) ([facility])      Past Surgical History:   Procedure Laterality Date    CARDIAC SURGERY N/A 3/13/2019    ABLATION A-FLUTTER performed by Corbin Stubbs MD at [facility] CARDIAC CATH LAB    EP DEVICE PROCEDURE N/A 5/11/2023    Remove & replace ICD biv multi leads performed by Corbin Stubbs MD at [facility] CARDIAC CATH LAB    EP DEVICE PROCEDURE N/A 5/11/2023    Lv lead 
skilled PT interventions to assist in further neuromuscular interventions to improve strength, balance and coordination.          PLAN:  Patient continues to benefit from skilled intervention to address the above impairments.  Continue treatment per established plan of care.    Recommend for next PT session: balance ex    Recommendation for discharge: (in order for the patient to meet his/her long term goals):   Intermittent physical therapy up to 2-3x/week in previous living setting    Other factors to consider for discharge: concern for safely navigating or managing the home environment    IF patient discharges home will need the following DME:  gait belt has been delivered for discharge       SUBJECTIVE:   Patient stated, \"I think I'm alright.\"    OBJECTIVE DATA SUMMARY:   Critical Behavior:  Orientation  Overall Orientation Status: Within Functional Limits  Cognition  Insights: Decreased awareness of deficits    Mild left facial droop increased at end of session with fatigue  Mild left lower extremity impairment with HELENA testing    Functional Mobility Training:  Bed Mobility:  Bed Mobility Training  Bed Mobility Training: Yes  Supine to Sit: Modified independent  Scooting: Independent  Transfers:  Transfer Training  Transfer Training: Yes  Interventions: Safety awareness training  Sit to Stand: Supervision  Stand to Sit: Supervision  Balance:  Balance  Sitting: Intact  Standing - Static: Good  Standing - Dynamic: Fair;Good   Ambulation/Gait Training:     Gait  Gait Training: Yes  Overall Level of Assistance: Stand-by assistance  Distance (ft): 160 Feet  Assistive Device: Gait belt  Interventions: Safety awareness training;Verbal cues   No loss of balance, mild LLE foot placement coordination deficit observed but patient without awareness / concern     Neuro Re-Education:   Right/left lateral, forward and backwards stepping challenges- needing minimal assist for stabilization with left lateral steps during weight 
(unsupported)  Sitting - Dynamic: Good (unsupported)  Standing: Without support  Standing - Static: Good  Standing - Dynamic: Good;Fair  Ambulation/Gait Training:                       Gait  Overall Level of Assistance: Contact-guard assistance;Stand-by assistance  Distance (ft): 120 Feet  Assistive Device: Gait belt  Interventions: Verbal cues  Step Length: Left shortened;Right shortened  Gait Abnormalities: Decreased step clearance;Path deviations         Standing rest breaks to facilitate proper SpO2 pleth.                                                                                                                                                                                                                                                   Barrera Balance Test:    Barrera Balance Scale  1. Sitting to Standing: Able to stand without using hands and stabilize independently  2. Standing Unsupported: Able to stand safely for 2 minutes  3. Sitting with Back Unsupported but Feet Supported on Floor or on a Stool: Able to sit safely and securely for 2 minutes  4. Standing to Sitting: Sits safely with minimal use of hands  5.  Transfers: Able to transfer safely with minor use of hands  6. Standing Unsupported with Eyes Closed: Able to stand 10 seconds safely  7. Standing Unsupported with Feet Together: Able to place feet together independently and stand 1 minute with supervision  8. Reach Forward with Outstretched Arm While Standing: Can reach forward 5 cm (2 inches)  9.  Object from Floor from a Standing Position: Unable to  and needs supervision while trying  10. Turning to Look Behind Over Left and Right Shoulders While Standing: Turns sideways only but maintains balance  11. Turn 360 Degrees: Able to turn 360 degrees safely but slowly  12. Place Alternate Foot on Step or Stool While Standing Unsupported: Able to complete greater than 2 steps needs minimal assist  13. Standing Unsupported One Foot in

## 2024-10-22 NOTE — CARE COORDINATION
Care Management Progress Note        Reason for Admission:   Facial droop [R29.810]  Hypothermia, initial encounter [T68.XXXA]  Cerebrovascular accident (CVA), unspecified mechanism (HCC) [I63.9]         Patient Admission Status: Inpatient  RUR: 12%  Hospitalization in the last 30 days (Readmission):  No        Transition of care plan:  Patient was discussed in IDR and is medically ready for discharge.     Dispo: return home with wife.     [facility] HH contact information has been placed on pt's AVS. Discharge summary has been sent in [employer].     Discharge plan communicated with patient and/or discharge caregiver: Yes . CM discussed dc plan with patient and patient's wife at bedside yesterday Monday 10/21. Both in agreement with dc plan.     Date 1st IMM letter given: 10/18/24. 2nd IMM: 10/21/24.     Outpatient follow-up.    Transport at discharge: family will provide transport at discharge.         ___________________________________________   Ute Gomez RN Case Manager  10/22/2024   12:25 PM

## 2024-10-22 NOTE — DISCHARGE SUMMARY
Hospitalist Discharge Summary     Patient ID:  Panchito Crespo  [id number]  89 y.o.  8/21/1933    Admit date: 10/18/2024    Discharge date and time: 10/22/2024    Admission Diagnoses: Facial droop [R29.810]  Hypothermia, initial encounter [T68.XXXA]  Cerebrovascular accident (CVA), unspecified mechanism (HCC) [I63.9]    Discharge Diagnoses:    Principal Problem:    Facial droop  Resolved Problems:    * No resolved hospital problems. *         Hospital Course:   Likely TIA: POA. Acute. Last known well: 11 AM 10/18/24. Not a tpa candidate on xarelto. NIH stroke scale 1. CT head negative. CT perfusion, CTA head/neck with no LVO, small vessel disease, small left MCA bifurcation and basilar tip aneurysms. Ldl and A1c ok. MRI ordered but not done. 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  Placed on Shala hugger. Suspect reactive neurologic response.  - Has since been normothermic  - Monitor temperature     SVT/A flutter   - Continue xarelto 20 mg QD, cleared by neuro      GERD  - Continue ppi      CKD IIIa  - Stable and at baseline      Prolonged INR   - INR 1.5, likely due to xarelto      Severe stenosis of the aortic valve: incidental  - outpt follow up    Imaging  CTA HEAD NECK W CONTRAST    Addendum Date: 10/18/2024    Addendum: Subcentimeter hyperdense focus in the tracy. Recommend nonemergent MR brain with contrast for further evaluation. Electronically signed by Sanna Ahuja    Result Date: 10/18/2024  1. No acute large vessel arterial occlusion. Mild to moderate atherosclerosis. 2. Small left MCA bifurcation and basilar tip aneurysms. 3. Refer to above findings for complete details.

## 2024-11-04 RX ORDER — LOSARTAN POTASSIUM 25 MG/1
25 TABLET ORAL DAILY
Qty: 90 TABLET | Refills: 2 | Status: SHIPPED | OUTPATIENT
Start: 2024-11-04

## 2024-11-04 NOTE — TELEPHONE ENCOUNTER
Med refilled per VO by MD.    Future Appointments   Date Time Provider Department Center   11/21/2024 10:30 AM Corbin Vaz MD [facility] BS AMB   5/16/2025 12:30 PM [facility] ECHO 1 [facility] BS AMB   5/16/2025  1:40 PM Milind Young MD [facility] BS AMB   10/8/2025  1:00 PM Corbin Stubbs MD [facility] BS AMB   10/8/2025  1:20 PM PACEMAKER, DOTTIE [facility] BS AMB

## 2024-11-14 PROCEDURE — 93294 REM INTERROG EVL PM/LDLS PM: CPT | Performed by: INTERNAL MEDICINE

## 2024-11-21 ENCOUNTER — OFFICE VISIT (OUTPATIENT)
Age: 89
End: 2024-11-21
Payer: MEDICARE

## 2024-11-21 VITALS
HEART RATE: 64 BPM | HEIGHT: 70 IN | RESPIRATION RATE: 18 BRPM | WEIGHT: 183 LBS | DIASTOLIC BLOOD PRESSURE: 70 MMHG | SYSTOLIC BLOOD PRESSURE: 110 MMHG | TEMPERATURE: 98 F | BODY MASS INDEX: 26.2 KG/M2

## 2024-11-21 DIAGNOSIS — I67.1 CEREBRAL ANEURYSM: Primary | ICD-10-CM

## 2024-11-21 PROCEDURE — 1159F MED LIST DOCD IN RCRD: CPT | Performed by: RADIOLOGY

## 2024-11-21 PROCEDURE — G8427 DOCREV CUR MEDS BY ELIG CLIN: HCPCS | Performed by: RADIOLOGY

## 2024-11-21 PROCEDURE — 1111F DSCHRG MED/CURRENT MED MERGE: CPT | Performed by: RADIOLOGY

## 2024-11-21 PROCEDURE — 1036F TOBACCO NON-USER: CPT | Performed by: RADIOLOGY

## 2024-11-21 PROCEDURE — 99204 OFFICE O/P NEW MOD 45 MIN: CPT | Performed by: RADIOLOGY

## 2024-11-21 PROCEDURE — G8484 FLU IMMUNIZE NO ADMIN: HCPCS | Performed by: RADIOLOGY

## 2024-11-21 PROCEDURE — 1123F ACP DISCUSS/DSCN MKR DOCD: CPT | Performed by: RADIOLOGY

## 2024-11-21 PROCEDURE — G8419 CALC BMI OUT NRM PARAM NOF/U: HCPCS | Performed by: RADIOLOGY

## 2024-11-21 NOTE — PROGRESS NOTES
New patient referred by [facility] presenting with Cerebral aneurysm and s/p CVA.  Spouse at visit.  Patient reports no residual from Stroke.  Denies headaches, dizziness, numbness or tingling blurred or double vision.  No new problems reported.

## 2024-11-21 NOTE — PROGRESS NOTES
Clinic Note      Patient: Panchito Crespo MRN: [id number]  SSN: [id number]    YOB: 1933  Age: 89 y.o.  Sex: male      Subjective:      Panchito Crespo is a 89 y.o. male new patient with history of arthritis, hypercholesterolemia, hypertension, SVT, and atrial flutter (on Xarelto), status post pacemaker, who presents on referral from the hospital for evaluation of cerebral aneurysms.    Patient denies headaches and other focal neurological symptoms.  He does not have a history of tobacco use.    Past Medical History:   Diagnosis Date    Arthritis     Hearing problem     Hypercholesterolemia     Hypertension     Stroke (HCC)     SVT (supraventricular tachycardia) (HCC)     Vision problems      Past Surgical History:   Procedure Laterality Date    CARDIAC SURGERY N/A 3/13/2019    ABLATION A-FLUTTER performed by Corbin Stubbs MD at [facility] CARDIAC CATH LAB    EP DEVICE PROCEDURE N/A 5/11/2023    Remove & replace ICD biv multi leads performed by Corbin Stubbs MD at [facility] CARDIAC CATH LAB    EP DEVICE PROCEDURE N/A 5/11/2023    Lv lead placement performed by Crobin Stubbs MD at [facility] CARDIAC CATH LAB    ICAR CATHETER ABLATION ARRHYTHMIA ADD ON N/A 3/13/2019    Ablation Svt/Vt Add On performed by Corbin Stubbs MD at [facility] CARDIAC CATH LAB    INS NEW/RPLCMT PRM PM W/TRANSV ELTRD ATRIAL&VENT  1/3/2019         INTRACARDIAC ELECTROPHYSIOLOGIC 3D MAPPING N/A 3/13/2019    Ep 3d Mapping performed by Corbin Stubbs MD at [facility] CARDIAC CATH LAB    ORTHOPEDIC SURGERY  2/10    L shoulder replacement      Family History   Problem Relation Age of Onset    Heart Disease Other     Stroke Other     Hypertension Other      Social History     Tobacco Use    Smoking status: Never    Smokeless tobacco: Never   Substance Use Topics    Alcohol use: Never      Current Outpatient Medications   Medication Sig Dispense Refill    losartan (COZAAR) 25 MG tablet Take 1 tablet by mouth once daily 90 tablet 2    aspirin 81

## 2024-12-03 NOTE — PROCEDURES
DATE OF PROCEDURE: 1/3/2019    PROCEDURE:  Implantation of dual-chamber pacemaker with fluoroscopy. HISTORY:  This is a 80 y. o.man with palpitation and fatigue and he has had bradycardia with amiodarone and beta blocker so those were stopped but he continues and was admitted with PSVT to 170 bpm.  He had CABG and CAD so will need beta blocker as well. He meets the indication for dual chamber pacer insertion. The risks and benefits were discussed with the patient and his daughter, wife and he agreed to proceed. PROCEDURE IN DETAIL:  After the informed written consent had been obtained, the patient was brought to the Electrophysiology Suite where the patient was prepped and draped in the usual sterile fashion. Conscious sedation was initiated and maintained with intravenous Versed and fentanyl. Local anesthesia with 2% Xylocaine was given in the left infraclavicular area. The #10 blade scalpel was then used to make a 3 cm incision below the left clavicle. Using the modified Seldinger technique 2 guidewires were advanced into the left subclavian vein. A subcutaneous device pocket was made in the inferomedial direction by blunt dissection. Over the medial guidewire, a 7 Polish peel-away introducer dilator was then advanced inside the vein. An active fixation pacing and sensing lead was positioned in the right ventricle basal septum. The lead was anchored down with #2 Ethibond sutures after the pacing and sensing were optimal.  Over the second guidewire another 7-Polish peel-away introducer dilator was then advanced inside the vein. The active fixation pacing and sensing lead was then advanced and positioned in the right atrial appendage. There was no diaphragmatic stimulation with 10 volts maximal output for either lead. The pocket is now irrigated with antibiotic solution and the lead was connected to the device and inserted inside the pocket. Vancomycin powder was placed in the pocket.  The Is this something you feel comfortable doing? If so, I will draft the letter and let pt know.    Brandy Musa MA on 12/3/2024 at 7:31 AM     pocket is now closed in three consecutive layers using 2-0 Vicryl sutures in continuous fashion. Steri strip is now applied over the surgical wound with Aquacel dressing. COMPLICATIONS:  None. ESTIMATED BLOOD LOSS: 10 mL. IMPLANTED HARDWARE:    The pacemaker is a [employer] MRI DR, model # [id number], serial # [id number]    ATRIAL LEAD:  model # [id number] , serial # [id number]    VENTRICULAR LEAD:   model # [id number] and serial # [id number]    Specimen removed: NONE    DATA:  The P wave is atrial flutter 1.3 mV, pacing impedance 472 ohms     The ventricular lead had the R wave sensing 8.3 mV and the pacing impedance 735 ohms and pacing threshold 0.8 volts at 0.4 ms. PROGRAMMED PARAMETER:  The device is programmed to DDDR pacing mode with the low rate of 60 beats per minute and upper tracking, sensor rate of 130 beats per minute. Mode switch is ON for 150 bpm      ASSESSMENT AND PLAN:  The patient will follow up with me and Dr Migdalia Blank in office  He will resume toprol  Anticoagulant when risk of pocket bleeding is lower.   Discussed with Dr Xiang Naranjo

## 2025-04-16 ENCOUNTER — TELEPHONE (OUTPATIENT)
Age: 89
End: 2025-04-16

## 2025-04-16 NOTE — TELEPHONE ENCOUNTER
Patient's PCP   is calling because the patient is in his office with a low heart rate in the 40's.PCP cut the patient's beta blocker in half.PCP wants the patient to be seen by the doctor or NP sooner than 5/5/25.Patient was also having SOB.    [phone]

## 2025-04-16 NOTE — TELEPHONE ENCOUNTER
Verified patient with two types of identifiers.   Return call to Dr Gayle who states that pt was there to establish care with reported HR in the 40's. ECG was not obtained during this visit. MD also states that pt complaining of worsening SOB and that he recommended to pt he cut is Metoprolol in half.  Informed that LRL was set to 70 and that pt HR would not generally get below that however that I would reach out to pt to discuss and have him send transmission for review.     Verified patient with two types of identifiers.   Spoke with patient and wife and advised them to send transmission for review once home from physicians office and that we woulc call to follow up after this has been received.   Patient verbalized understanding and will call with any other questions.

## 2025-04-17 ENCOUNTER — TELEPHONE (OUTPATIENT)
Age: 89
End: 2025-04-17

## 2025-04-17 RX ORDER — METOPROLOL SUCCINATE 50 MG/1
50 TABLET, EXTENDED RELEASE ORAL 2 TIMES DAILY
Qty: 60 TABLET | Refills: 1 | Status: SHIPPED | OUTPATIENT
Start: 2025-04-17

## 2025-04-17 NOTE — TELEPHONE ENCOUNTER
Verified patient with two types of identifiers.   Spoke with patients wife Sneha, listed on PHI and advised of MD recommendations for Metoprolol. Advised Dr Friend team would be in touch for further instructions/appt information.   She verbalized understanding and will call with any other questions.

## 2025-04-17 NOTE — TELEPHONE ENCOUNTER
Telephone call made to patient. Wife answered.Two patient identifiers verified. Education provided on heart catheterization and CT prior to valve clinic appointment for aortic stenosis. Pt wishes to proceed. All questions answered.    Reiterated that the patient's HR did not go below 70 due to his device settings.

## 2025-04-17 NOTE — TELEPHONE ENCOUNTER
Phoned patient to discuss remote transmission findings. Presenting EGMs with frequent PVCs. These go undetected on external monitoring devices such as pulse ox and BP cuff so the device can read as a \"low HR\". All device and lead parameters WNL. Device functioning appropriately as programmed. No events.    LVM with patient to return call to discuss.

## 2025-04-17 NOTE — TELEPHONE ENCOUNTER
Patient's wife is calling to speak with Gabriella in the device clinic about the status of her husbands device.Please give a call back.    [phone] Sneha (wife)

## 2025-04-18 ENCOUNTER — TELEPHONE (OUTPATIENT)
Age: 89
End: 2025-04-18

## 2025-04-18 DIAGNOSIS — I35.0 NONRHEUMATIC AORTIC (VALVE) STENOSIS: Primary | ICD-10-CM

## 2025-04-18 DIAGNOSIS — I35.0 NONRHEUMATIC AORTIC VALVE STENOSIS: Primary | ICD-10-CM

## 2025-04-18 NOTE — TELEPHONE ENCOUNTER
Spoke to patient/wife and scheduled him for [facility] with Dr. Young on 4/29 @ 12:00 pm with 10:00 am arrival. Instructions gone over with patient and advised labs already done, advised to hold Xarelto 2 days prior to procedure. Patient verbalized understanding and had no questions at the time of call.    Patient identification verified x2.         Patient Instructions    Catheterization   Pre-procedure instructions  Lab work: Already done.  [facility] Draw Sites:  [facility] [place]: [address] [facility] [address]  [place]: [address] [address]  [place]: [address] [place] [address] [facility] [address]  [facility]: [address] [facility] [address]  [place]: [address] [facility] [address]  [facility]: [address] [facility] [address]  [place]: [address] [facility] [address]  [facility]: [address]. [place] [address]  You may have clear liquids up to 2 hours prior to your procedure and a light meal up to 6 hours prior to your procedure.   Stop blood thinners 2 days prior to procedure EXCEPT: Brilinta (Ticagrelor), Plavix (Clopidogrel) or Aspirin, Effient (Prasugrel).   Medications restrictions: Hold Xarelto 2 days prior to procedure.    Procedure day  Have a  that will bring you and take you home (6-8 hours)  Have a responsible adult that can stay with you for 24 hours  Please note: Some procedures may involve access through one of the vessels in your groin. In order to prepare you for your procedure, the prep team will request you to remove all clothing, including your undergarments and provide you with a patient gown. Additionally, they will need to prep your groin by shaving the area and cleansing with chlorahexidine.    Bring ID and insurance card  Wear comfortable clothing  Leave valuables at home,   Bring: dentures, hearing aids, or

## 2025-04-18 NOTE — TELEPHONE ENCOUNTER
Received fax from PCP with labs drawn 25.    CBC  HGB:148    CMP:   NA:139  K:5  BUN:23  Crea:13.5    Lipid:  Chol:166  T  HCL:39  LDL:80  VLDL:47  CHolH:4.3  NHDL:124

## 2025-04-23 ENCOUNTER — TELEPHONE (OUTPATIENT)
Age: 89
End: 2025-04-23

## 2025-04-23 NOTE — TELEPHONE ENCOUNTER
Spoke to the patient's wife regarding scheduling his TAVR CTA and clinic appointment. He is scheduled for his cath on 04/29.  Will try to schedule his TAVR CTA early the following week and bring him to clinic on 05/06/25.

## 2025-04-23 NOTE — TELEPHONE ENCOUNTER
Left a message for the patient regarding scheduling an appointment in the valve clinic and his TAVR CTA.

## 2025-04-24 ENCOUNTER — TELEPHONE (OUTPATIENT)
Age: 89
End: 2025-04-24

## 2025-04-24 NOTE — TELEPHONE ENCOUNTER
Left a message for the patient regarding the schedule for his TAVR CTA and clinic appointment. He is scheduled for his CTA on 05/05/2025 at 11:15 am.

## 2025-04-29 ENCOUNTER — HOSPITAL ENCOUNTER (OUTPATIENT)
Facility: HOSPITAL | Age: 89
Discharge: HOME | End: 2025-04-29
Attending: INTERNAL MEDICINE | Admitting: INTERNAL MEDICINE
Payer: MEDICARE

## 2025-04-29 VITALS
BODY MASS INDEX: 26.77 KG/M2 | HEART RATE: 88 BPM | HEIGHT: 70 IN | DIASTOLIC BLOOD PRESSURE: 71 MMHG | WEIGHT: 187 LBS | TEMPERATURE: 98.1 F | OXYGEN SATURATION: 93 % | RESPIRATION RATE: 24 BRPM | SYSTOLIC BLOOD PRESSURE: 124 MMHG

## 2025-04-29 DIAGNOSIS — I35.0 NONRHEUMATIC AORTIC (VALVE) STENOSIS: ICD-10-CM

## 2025-04-29 LAB
ANION GAP SERPL CALC-SCNC: 4 MMOL/L (ref 2–12)
BASOPHILS # BLD: 0.02 K/UL (ref 0–0.1)
BASOPHILS NFR BLD: 0.4 % (ref 0–1)
BUN SERPL-MCNC: 21 MG/DL (ref 6–20)
BUN/CREAT SERPL: 16 (ref 12–20)
CALCIUM SERPL-MCNC: 9.1 MG/DL (ref 8.5–10.1)
CHLORIDE SERPL-SCNC: 107 MMOL/L (ref 97–108)
CO2 SERPL-SCNC: 26 MMOL/L (ref 21–32)
CREAT SERPL-MCNC: 1.34 MG/DL (ref 0.7–1.3)
DIFFERENTIAL METHOD BLD: ABNORMAL
ECHO BSA: 2.05 M2
EOSINOPHIL # BLD: 0.17 K/UL (ref 0–0.4)
EOSINOPHIL NFR BLD: 3.2 % (ref 0–7)
ERYTHROCYTE [DISTWIDTH] IN BLOOD BY AUTOMATED COUNT: 14.4 % (ref 11.5–14.5)
GLUCOSE SERPL-MCNC: 110 MG/DL (ref 65–100)
HCT VFR BLD AUTO: 42.9 % (ref 36.6–50.3)
HGB BLD-MCNC: 13.5 G/DL (ref 12.1–17)
IMM GRANULOCYTES # BLD AUTO: 0.02 K/UL (ref 0–0.04)
IMM GRANULOCYTES NFR BLD AUTO: 0.4 % (ref 0–0.5)
LYMPHOCYTES # BLD: 0.99 K/UL (ref 0.8–3.5)
LYMPHOCYTES NFR BLD: 18.7 % (ref 12–49)
MCH RBC QN AUTO: 29.1 PG (ref 26–34)
MCHC RBC AUTO-ENTMCNC: 31.5 G/DL (ref 30–36.5)
MCV RBC AUTO: 92.5 FL (ref 80–99)
MONOCYTES # BLD: 0.7 K/UL (ref 0–1)
MONOCYTES NFR BLD: 13.2 % (ref 5–13)
NEUTS SEG # BLD: 3.39 K/UL (ref 1.8–8)
NEUTS SEG NFR BLD: 64.1 % (ref 32–75)
NRBC # BLD: 0 K/UL (ref 0–0.01)
NRBC BLD-RTO: 0 PER 100 WBC
PLATELET # BLD AUTO: 167 K/UL (ref 150–400)
PMV BLD AUTO: 12.2 FL (ref 8.9–12.9)
POTASSIUM SERPL-SCNC: 4.8 MMOL/L (ref 3.5–5.1)
RBC # BLD AUTO: 4.64 M/UL (ref 4.1–5.7)
SODIUM SERPL-SCNC: 137 MMOL/L (ref 136–145)
WBC # BLD AUTO: 5.3 K/UL (ref 4.1–11.1)

## 2025-04-29 PROCEDURE — 85025 COMPLETE CBC W/AUTO DIFF WBC: CPT

## 2025-04-29 PROCEDURE — 76937 US GUIDE VASCULAR ACCESS: CPT | Performed by: INTERNAL MEDICINE

## 2025-04-29 PROCEDURE — 6360000002 HC RX W HCPCS: Performed by: INTERNAL MEDICINE

## 2025-04-29 PROCEDURE — C1894 INTRO/SHEATH, NON-LASER: HCPCS | Performed by: INTERNAL MEDICINE

## 2025-04-29 PROCEDURE — 6360000004 HC RX CONTRAST MEDICATION: Performed by: INTERNAL MEDICINE

## 2025-04-29 PROCEDURE — C1887 CATHETER, GUIDING: HCPCS | Performed by: INTERNAL MEDICINE

## 2025-04-29 PROCEDURE — C1725 CATH, TRANSLUMIN NON-LASER: HCPCS | Performed by: INTERNAL MEDICINE

## 2025-04-29 PROCEDURE — 2709999900 HC NON-CHARGEABLE SUPPLY: Performed by: INTERNAL MEDICINE

## 2025-04-29 PROCEDURE — 93457 R HRT ART/GRFT ANGIO: CPT | Performed by: INTERNAL MEDICINE

## 2025-04-29 PROCEDURE — 80048 BASIC METABOLIC PNL TOTAL CA: CPT

## 2025-04-29 PROCEDURE — C1769 GUIDE WIRE: HCPCS | Performed by: INTERNAL MEDICINE

## 2025-04-29 PROCEDURE — 99152 MOD SED SAME PHYS/QHP 5/>YRS: CPT | Performed by: INTERNAL MEDICINE

## 2025-04-29 PROCEDURE — 75710 ARTERY X-RAYS ARM/LEG: CPT | Performed by: INTERNAL MEDICINE

## 2025-04-29 PROCEDURE — C1713 ANCHOR/SCREW BN/BN,TIS/BN: HCPCS | Performed by: INTERNAL MEDICINE

## 2025-04-29 RX ORDER — MIDAZOLAM HYDROCHLORIDE 1 MG/ML
INJECTION, SOLUTION INTRAMUSCULAR; INTRAVENOUS PRN
Status: DISCONTINUED | OUTPATIENT
Start: 2025-04-29 | End: 2025-04-29 | Stop reason: HOSPADM

## 2025-04-29 RX ORDER — LIDOCAINE HYDROCHLORIDE 10 MG/ML
INJECTION, SOLUTION INFILTRATION; PERINEURAL PRN
Status: DISCONTINUED | OUTPATIENT
Start: 2025-04-29 | End: 2025-04-29 | Stop reason: HOSPADM

## 2025-04-29 RX ORDER — SODIUM CHLORIDE 9 MG/ML
INJECTION, SOLUTION INTRAVENOUS CONTINUOUS
Status: DISCONTINUED | OUTPATIENT
Start: 2025-04-29 | End: 2025-04-29 | Stop reason: HOSPADM

## 2025-04-29 RX ORDER — SODIUM CHLORIDE 9 MG/ML
INJECTION, SOLUTION INTRAVENOUS PRN
Status: DISCONTINUED | OUTPATIENT
Start: 2025-04-29 | End: 2025-04-29 | Stop reason: HOSPADM

## 2025-04-29 RX ORDER — SODIUM CHLORIDE 0.9 % (FLUSH) 0.9 %
5-40 SYRINGE (ML) INJECTION PRN
Status: DISCONTINUED | OUTPATIENT
Start: 2025-04-29 | End: 2025-04-29 | Stop reason: HOSPADM

## 2025-04-29 RX ORDER — FENTANYL CITRATE 50 UG/ML
INJECTION, SOLUTION INTRAMUSCULAR; INTRAVENOUS PRN
Status: DISCONTINUED | OUTPATIENT
Start: 2025-04-29 | End: 2025-04-29 | Stop reason: HOSPADM

## 2025-04-29 RX ORDER — SODIUM CHLORIDE 0.9 % (FLUSH) 0.9 %
5-40 SYRINGE (ML) INJECTION EVERY 12 HOURS SCHEDULED
Status: DISCONTINUED | OUTPATIENT
Start: 2025-04-29 | End: 2025-04-29 | Stop reason: HOSPADM

## 2025-04-29 RX ORDER — HEPARIN SODIUM 1000 [USP'U]/ML
INJECTION, SOLUTION INTRAVENOUS; SUBCUTANEOUS PRN
Status: DISCONTINUED | OUTPATIENT
Start: 2025-04-29 | End: 2025-04-29 | Stop reason: HOSPADM

## 2025-04-29 RX ORDER — IOPAMIDOL 755 MG/ML
INJECTION, SOLUTION INTRAVASCULAR PRN
Status: DISCONTINUED | OUTPATIENT
Start: 2025-04-29 | End: 2025-04-29 | Stop reason: HOSPADM

## 2025-04-29 RX ORDER — ACETAMINOPHEN 325 MG/1
650 TABLET ORAL EVERY 4 HOURS PRN
Status: DISCONTINUED | OUTPATIENT
Start: 2025-04-29 | End: 2025-04-29 | Stop reason: HOSPADM

## 2025-04-29 NOTE — PROGRESS NOTES
Cardiac Cath Lab Procedure Area Arrival Note:    Panchito Crespo arrived to Cardiac Cath Lab, Procedure Area. Patient identifiers verified with NAME and DATE OF BIRTH. Procedure verified with patient. Consent forms verified. Allergies verified. Patient informed of procedure and plan of care. Questions answered with review. Patient voiced understanding of procedure and plan of care.    Patient on cardiac monitor, non-invasive blood pressure, SPO2 monitor. On O2 @ 2 lpm via NC.  IV of NS on pump at 25 ml/hr. Patient status doing well without problems. Patient is A&Ox 4. Patient reports no complaints.     Patient medicated during procedure with orders obtained and verified by Dr. Young.    Refer to patients Cardiac Cath Lab PROCEDURE REPORT for vital signs, assessment, status, and response during procedure, printed at end of case. Printed report on chart or scanned into chart.

## 2025-04-29 NOTE — PROGRESS NOTES
Cardiac Cath Lab Recovery Arrival Note:      Panchito Crespo arrived to Cardiac Cath Lab, Recovery Area. Staff introduced to patient. Patient identifiers verified with NAME and DATE OF BIRTH. Procedure verified with patient. Consent forms reviewed and signed by patient or authorized representative and verified. Allergies verified. Patient informed of procedure and plan of care. Questions answered with review. Patient prepped for procedure, per orders from physician, prior to arrival.    Patient on cardiac monitor, non-invasive blood pressure, SPO2 monitor. Patient is A&Ox 4. Patient reports no pain.     Patient in stretcher, in low position, with side rails up, call bell within reach, patient instructed to call of assistance as needed.    Patient prep in: [facility] Recovery Area, Bed# 1.   Family in: waiting area .   Prep by: MARCELLA Ruano RN

## 2025-04-29 NOTE — PROGRESS NOTES
1615 Ambulated patient to the bathroom with a steady gait, voided without difficulty. Patient denies chest pain, shortness of breath, or dizziness. Returned to stretcher. Vital signs stable.     1600 Procedural site is clean, dry, and intact. No bleeding, no hematoma.     1620 Wife Assisted patient in dressing.     1615 Educated patient about their sedation precautions such as not driving, operating any machinery, or signing legal documents 24 hours post procedure.     1600 Reviewed discharge instructions, Answered all questions. Verbalized understanding.     1620 Removed peripheral IV.    1630 Escorted to discharge area in a wheelchair with all of their belongings.    Patient's spouse present to take patient home. Reviewed discharge instructions with patients' spouse, they verbalized understanding.

## 2025-04-29 NOTE — H&P
Dr. Young                                  [facility].  [phone]               Cardiology structural heart disease consult/Progress Note        Requesting/referring provider: Srinivasan Padilla PA, Dr. Stubbs      Reason for Consult: Aortic stenosis      Assessment/Plan:  1.  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  2.  Coronary disease, s/p coronary bypass grafting.  3.  Dual-chamber pacemaker  placement for sinus node dysfunction  4.  Hypertension  5.  Hyperlipidemia  6.  Severe aortic stenosis with minimal symptoms  7.  History of cardiomyopathy multiple factors including RV pacing and coronary  artery disease    Mr. Campoverde is seen at the request of Dr. Stubbs.  He has minimal shortness of breath.  He does have aortic stenosis which clinically is now severe.  His echocardiogram that was reviewed from past month also is consistent with severe aortic sinus with calculated aortic valve area of 0.8 cm.  Peak velocity velocity of 3.7 to 3.9 m/s.    His symptoms appear to be rather mild and stable.  I have discussed the options of considering aortic valve intervention versus continuing close surveillance.  His mild symptoms may be not necessarily related to his aortic valve cyst have been chronic and predate development of severe aortic stenosis.  At this point in time, patient and his family want to continue surveillance for development of progressive symptoms.  He will continue his routine follow-up with Dr. Stubbs and yearly follow-up with me.    Natural course and history of aortic stenosis were discussed with the patient along with her high risk of mortality of 50% at 2 to 5 years independent of noncardiac issues.    Addendum:  Will proceed with L/R HC for TAVR planning.    I discussed the risks/benefits/alternatives of the procedure with the patient. Risks include (but are not limited to) bleeding, infection,stroke,heart attack, need for

## 2025-04-29 NOTE — PROGRESS NOTES
TRANSFER - IN REPORT:    Verbal report received from Celine CONTI on Panchito Crespo  being received from Cardiac cath lab  for routine progression of patient care. Report consisted of patient’s Situation, Background, Assessment and Recommendations(SBAR). Information from the following report(s) Kardex and Procedure Summary was reviewed with the receiving clinician. Opportunity for questions and clarification was provided. Assessment completed upon patient’s arrival to Cardiac Cath Lab RECOVERY AREA and care assumed.       Cardiac Cath Lab Recovery Arrival Note:    Panchito Crespo arrived to [facility] recovery area.  Patient procedure= R and LHC. Patient on cardiac monitor, non-invasive blood pressure, SPO2 monitor. On RA .  IV  of NS on pump at 75 ml/hr. Patient status doing well without problems. Patient is A&Ox 4. Patient reports no pain.    PROCEDURE SITE CHECK:    Procedure site:without any bleeding or hematoma, No pain/discomfort reported at procedure site.     No change in patient status. Continue to monitor patient and status.

## 2025-04-29 NOTE — PROGRESS NOTES
CATH LAB to RECOVERY ROOM REPORT    Procedure: LHC/RHC/Valve Study    MD: DECLAN Young MD    The procedure was diagnostic only.    Verbal Report given to Recovery Nurse on patient being transferred to Cardiac Cath Lab  for routine post-op. Patient stable upon transfer to .  Vitals, mental status, MAR, procedural summary discussed with recovery RN.    Medication given during procedure:    Contrast:60 mL                          Heparin:4000 units     Versed:1 mg                                                               Fentanyl:25 mcg                                                           Other Meds/Drips given:    Nitroglycerin 100 mcg      Sheaths:    Left radial sheath pulled at 1313, band at 14 mL of air    Right internal jugular vein sheath pulled at 1315 pm, secured with a band-aid.

## 2025-05-05 ENCOUNTER — HOSPITAL ENCOUNTER (OUTPATIENT)
Facility: HOSPITAL | Age: 89
Discharge: HOME | End: 2025-05-08
Payer: MEDICARE

## 2025-05-05 ENCOUNTER — TELEPHONE (OUTPATIENT)
Age: 89
End: 2025-05-05

## 2025-05-05 DIAGNOSIS — I35.0 NONRHEUMATIC AORTIC VALVE STENOSIS: ICD-10-CM

## 2025-05-05 LAB — CREAT BLD-MCNC: 1.5 MG/DL (ref 0.6–1.3)

## 2025-05-05 PROCEDURE — 75572 CT HRT W/3D IMAGE: CPT

## 2025-05-05 PROCEDURE — 82565 ASSAY OF CREATININE: CPT

## 2025-05-05 PROCEDURE — 6360000004 HC RX CONTRAST MEDICATION: Performed by: STUDENT IN AN ORGANIZED HEALTH CARE EDUCATION/TRAINING PROGRAM

## 2025-05-05 RX ORDER — IOPAMIDOL 755 MG/ML
100 INJECTION, SOLUTION INTRAVASCULAR
Status: COMPLETED | OUTPATIENT
Start: 2025-05-05 | End: 2025-05-05

## 2025-05-05 RX ADMIN — IOPAMIDOL 100 ML: 755 INJECTION, SOLUTION INTRAVENOUS at 12:14

## 2025-05-05 RX ADMIN — IOPAMIDOL 20 ML: 755 INJECTION, SOLUTION INTRAVENOUS at 12:14

## 2025-05-05 NOTE — TELEPHONE ENCOUNTER
Received call from our [place] office that patient and family came in today thinking he had an appt with BETSY Christensen. Note states that appt was canceled per Dr. Young on 4/30. Family is requesting a [employer] message to be sent with the plan and all appointments. They were unaware of valve clinic appointment on 5/8.

## 2025-05-06 ENCOUNTER — TELEPHONE (OUTPATIENT)
Age: 89
End: 2025-05-06

## 2025-05-06 NOTE — TELEPHONE ENCOUNTER
Spoke to the patient's wife regarding moving his valve clinic appointment to 0900 on 05/08/2025. They are agreeable.

## 2025-05-07 NOTE — PROGRESS NOTES
Trusopt, Xalatan eye drops    The patient was evaluated by Mitzy Young and Leonela who discussed conventional aortic valve replacement and TAVR, as well as the risks and benefits of both versus continuing medical therapy with the patient and his wife and daughter. All questions were answered. Mitzy Young and Leonela felt that TAVR is a better option for this patient, given age, frailty, and co-morbidities. With all the options available, the patient has agreed to proceed with TAVR for the treatment of his aortic stenosis and will be scheduled.     Panchito Crespo was seen and assessed then plan created with Dr. Gipson.

## 2025-05-08 ENCOUNTER — PREP FOR PROCEDURE (OUTPATIENT)
Age: 89
End: 2025-05-08

## 2025-05-08 ENCOUNTER — OFFICE VISIT (OUTPATIENT)
Age: 89
End: 2025-05-08
Payer: MEDICARE

## 2025-05-08 VITALS
TEMPERATURE: 96.8 F | DIASTOLIC BLOOD PRESSURE: 69 MMHG | HEART RATE: 46 BPM | SYSTOLIC BLOOD PRESSURE: 116 MMHG | OXYGEN SATURATION: 96 %

## 2025-05-08 DIAGNOSIS — I35.0 NONRHEUMATIC AORTIC (VALVE) STENOSIS: Primary | ICD-10-CM

## 2025-05-08 DIAGNOSIS — I35.0 NONRHEUMATIC AORTIC VALVE STENOSIS: Primary | ICD-10-CM

## 2025-05-08 PROCEDURE — 1036F TOBACCO NON-USER: CPT | Performed by: THORACIC SURGERY (CARDIOTHORACIC VASCULAR SURGERY)

## 2025-05-08 PROCEDURE — 99215 OFFICE O/P EST HI 40 MIN: CPT | Performed by: INTERNAL MEDICINE

## 2025-05-08 PROCEDURE — G8419 CALC BMI OUT NRM PARAM NOF/U: HCPCS | Performed by: INTERNAL MEDICINE

## 2025-05-08 PROCEDURE — 1123F ACP DISCUSS/DSCN MKR DOCD: CPT | Performed by: INTERNAL MEDICINE

## 2025-05-08 PROCEDURE — G8428 CUR MEDS NOT DOCUMENT: HCPCS | Performed by: INTERNAL MEDICINE

## 2025-05-08 PROCEDURE — G8427 DOCREV CUR MEDS BY ELIG CLIN: HCPCS | Performed by: NURSE PRACTITIONER

## 2025-05-08 PROCEDURE — G8419 CALC BMI OUT NRM PARAM NOF/U: HCPCS | Performed by: NURSE PRACTITIONER

## 2025-05-08 PROCEDURE — 1123F ACP DISCUSS/DSCN MKR DOCD: CPT | Performed by: NURSE PRACTITIONER

## 2025-05-08 PROCEDURE — 1159F MED LIST DOCD IN RCRD: CPT | Performed by: NURSE PRACTITIONER

## 2025-05-08 PROCEDURE — 1036F TOBACCO NON-USER: CPT | Performed by: INTERNAL MEDICINE

## 2025-05-08 PROCEDURE — 1160F RVW MEDS BY RX/DR IN RCRD: CPT | Performed by: NURSE PRACTITIONER

## 2025-05-08 PROCEDURE — 99205 OFFICE O/P NEW HI 60 MIN: CPT | Performed by: NURSE PRACTITIONER

## 2025-05-08 RX ORDER — SODIUM CHLORIDE 0.9 % (FLUSH) 0.9 %
5-40 SYRINGE (ML) INJECTION PRN
Status: CANCELLED | OUTPATIENT
Start: 2025-05-08

## 2025-05-08 RX ORDER — SODIUM CHLORIDE 0.9 % (FLUSH) 0.9 %
5-40 SYRINGE (ML) INJECTION EVERY 12 HOURS SCHEDULED
Status: CANCELLED | OUTPATIENT
Start: 2025-05-08

## 2025-05-08 RX ORDER — SODIUM CHLORIDE 9 MG/ML
INJECTION, SOLUTION INTRAVENOUS PRN
Status: CANCELLED | OUTPATIENT
Start: 2025-05-08

## 2025-05-08 ASSESSMENT — KANSAS CITY CARDIOMYOPATHY QUESTIONNAIRE (KCCQ12)
5. OVER THE PAST 2 WEEKS, ON AVERAGE, HOW MANY TIMES HAVE YOU BEEN FORCED TO SLEEP SITTING UP IN A CHAIR OR WITH AT LEAST 3 PILLOWS TO PROP YOU UP BECAUSE OF SHORTNESS OF BREATH?: NEVER OVER THE PAST 2 WEEKS
8A. OVER THE PAST 2 WEEKS, ON AVERAGE, HOW HAS HEART FAILURE LIMITED YOU ABILITY TO DO HOBBIES OR RECREATIONAL ACTIVITIES: DOES NOT APPLY OR DID NOT DO FOR OTHER REASONS
4. OVER THE PAST 2 WEEKS, ON AVERAGE, HOW MANY TIMES HAS SHORTNESS OF BREATH LIMITED YOUR ABILITY TO DO WHAT YOU WANTED: ALL OF THE TIME
7. IF YOU HAD TO SPEND THE REST OF YOUR LIFE WITH YOUR HEART FAILURE THE WAY IT IS RIGHT NOW, HOW WOULD YOU FEEL ABOUT THIS?: SOMEWHAT SATISFIED
8B. OVER THE PAST 2 WEEKS, ON AVERAGE, HOW HAS HEART FAILURE LIMITED YOU ABILITY TO WORK OR DO HOUSEHOLD CHORES: LIMITED QUITE A BIT
1C. OVER THE PAST 2 WEEKS, HOW MUCH WERE YOU LIMITED BY HEART FAILURE SYMPTOMS (SHORTNESS OF BREATH OR FATIGUE) WHEN HURRYING OR JOGGING (AS IF TO CATCH A BUS): EXTREMELY LIMITED
8C. OVER THE PAST 2 WEEKS, ON AVERAGE, HOW HAS HEART FAILURE LIMITED YOU ABILITY TO VISIT FAMILY AND FRIENDS OUR OF YOUR HOME: DID NOT LIMIT AT ALL
1B. OVER THE PAST 2 WEEKS, HOW MUCH WERE YOU LIMITED BY HEART FAILURE SYMPTOMS (SHORTNESS OF BREATH OR FATIGUE) WHEN WALKING 1 BLOCK ON LEVEL GROUND: SLIGHTLY LIMITED
1A. OVER THE PAST 2 WEEKS, HOW MUCH WERE YOU LIMITED BY HEART FAILURE SYMPTOMS (SHORTNESS OF BREATH OR FATIGUE) WHEN SHOWERING OR BATHING: NOT AT ALL LIMITED
6. OVER THE PAST 2 WEEKS, HOW MUCH HAS YOUR HEART FAILURE LIMITED YOUR ENJOYMENT OF LIFE: IT HAS MODERATELY LIMITED MY ENJOYMENT OF LIFE
2. OVER THE PAST 2 WEEKS, HOW MANY TIMES DID YOU HAVE SWELLING IN YOUR FEET, ANKLES OR LEGS WHEN YOU WOKE UP IN THE MORNING: EVERY MORNING
3. OVER THE PAST 2 WEEKS, ON AVERAGE, HOW MANY TIMES HAS FATIGUE LIMITED YOUR ABILITY TO DO WHAT YOU WANTED: 1-2 TIMES PER WEEK

## 2025-05-08 NOTE — PERIOP NOTE
PAT: 5-12 @ 1;30    essage  Received: Today  Brittney Navarro Ashaki N; Kimi Reza  PAT REQUEST 5/12@1:30  SURGERY 5/15

## 2025-05-12 ENCOUNTER — HOSPITAL ENCOUNTER (OUTPATIENT)
Facility: HOSPITAL | Age: 89
Discharge: HOME | End: 2025-05-15
Payer: MEDICARE

## 2025-05-12 VITALS
WEIGHT: 191 LBS | DIASTOLIC BLOOD PRESSURE: 67 MMHG | BODY MASS INDEX: 27.35 KG/M2 | HEIGHT: 70 IN | SYSTOLIC BLOOD PRESSURE: 121 MMHG | RESPIRATION RATE: 16 BRPM | TEMPERATURE: 97.8 F | HEART RATE: 70 BPM

## 2025-05-12 DIAGNOSIS — Z95.2 S/P TAVR (TRANSCATHETER AORTIC VALVE REPLACEMENT): Primary | ICD-10-CM

## 2025-05-12 DIAGNOSIS — I35.0 NONRHEUMATIC AORTIC (VALVE) STENOSIS: Primary | ICD-10-CM

## 2025-05-12 LAB
ALBUMIN SERPL-MCNC: 3.4 G/DL (ref 3.5–5)
ALBUMIN/GLOB SERPL: 1.1 (ref 1.1–2.2)
ALP SERPL-CCNC: 75 U/L (ref 45–117)
ALT SERPL-CCNC: 24 U/L (ref 12–78)
ANION GAP SERPL CALC-SCNC: 7 MMOL/L (ref 2–12)
APTT PPP: 38.3 SEC (ref 22.1–31)
AST SERPL-CCNC: 17 U/L (ref 15–37)
BILIRUB SERPL-MCNC: 0.7 MG/DL (ref 0.2–1)
BUN SERPL-MCNC: 24 MG/DL (ref 6–20)
BUN/CREAT SERPL: 16 (ref 12–20)
CALCIUM SERPL-MCNC: 9 MG/DL (ref 8.5–10.1)
CHLORIDE SERPL-SCNC: 108 MMOL/L (ref 97–108)
CO2 SERPL-SCNC: 24 MMOL/L (ref 21–32)
CREAT SERPL-MCNC: 1.53 MG/DL (ref 0.7–1.3)
ERYTHROCYTE [DISTWIDTH] IN BLOOD BY AUTOMATED COUNT: 14.6 % (ref 11.5–14.5)
EST. AVERAGE GLUCOSE BLD GHB EST-MCNC: 108 MG/DL
GLOBULIN SER CALC-MCNC: 3.2 G/DL (ref 2–4)
GLUCOSE SERPL-MCNC: 97 MG/DL (ref 65–100)
HBA1C MFR BLD: 5.4 % (ref 4–5.6)
HCT VFR BLD AUTO: 40.4 % (ref 36.6–50.3)
HGB BLD-MCNC: 12.9 G/DL (ref 12.1–17)
INR PPP: 1.4 (ref 0.9–1.1)
MAGNESIUM SERPL-MCNC: 2.2 MG/DL (ref 1.6–2.4)
MCH RBC QN AUTO: 29.5 PG (ref 26–34)
MCHC RBC AUTO-ENTMCNC: 31.9 G/DL (ref 30–36.5)
MCV RBC AUTO: 92.4 FL (ref 80–99)
NRBC # BLD: 0 K/UL (ref 0–0.01)
NRBC BLD-RTO: 0 PER 100 WBC
PLATELET # BLD AUTO: 159 K/UL (ref 150–400)
PMV BLD AUTO: 13 FL (ref 8.9–12.9)
POTASSIUM SERPL-SCNC: 4.8 MMOL/L (ref 3.5–5.1)
PROT SERPL-MCNC: 6.6 G/DL (ref 6.4–8.2)
PROTHROMBIN TIME: 14.5 SEC (ref 9.2–11.2)
RBC # BLD AUTO: 4.37 M/UL (ref 4.1–5.7)
SODIUM SERPL-SCNC: 139 MMOL/L (ref 136–145)
THERAPEUTIC RANGE: ABNORMAL SECS (ref 58–77)
TSH SERPL DL<=0.05 MIU/L-ACNC: 3.05 UIU/ML (ref 0.36–3.74)
WBC # BLD AUTO: 5.6 K/UL (ref 4.1–11.1)

## 2025-05-12 PROCEDURE — 84443 ASSAY THYROID STIM HORMONE: CPT

## 2025-05-12 PROCEDURE — 93005 ELECTROCARDIOGRAM TRACING: CPT | Performed by: THORACIC SURGERY (CARDIOTHORACIC VASCULAR SURGERY)

## 2025-05-12 PROCEDURE — 86850 RBC ANTIBODY SCREEN: CPT

## 2025-05-12 PROCEDURE — 86901 BLOOD TYPING SEROLOGIC RH(D): CPT

## 2025-05-12 PROCEDURE — 85610 PROTHROMBIN TIME: CPT

## 2025-05-12 PROCEDURE — 83036 HEMOGLOBIN GLYCOSYLATED A1C: CPT

## 2025-05-12 PROCEDURE — 86923 COMPATIBILITY TEST ELECTRIC: CPT

## 2025-05-12 PROCEDURE — 83735 ASSAY OF MAGNESIUM: CPT

## 2025-05-12 PROCEDURE — 85027 COMPLETE CBC AUTOMATED: CPT

## 2025-05-12 PROCEDURE — 80053 COMPREHEN METABOLIC PANEL: CPT

## 2025-05-12 PROCEDURE — 86900 BLOOD TYPING SEROLOGIC ABO: CPT

## 2025-05-12 PROCEDURE — 85730 THROMBOPLASTIN TIME PARTIAL: CPT

## 2025-05-12 RX ORDER — SODIUM CHLORIDE 9 MG/ML
INJECTION, SOLUTION INTRAVENOUS PRN
Status: DISCONTINUED | OUTPATIENT
Start: 2025-05-12 | End: 2025-05-16 | Stop reason: HOSPADM

## 2025-05-12 NOTE — PROGRESS NOTES
(Non-Medical): No   Physical Activity: Not on file   Stress: Not on file   Social Connections: Not on file   Intimate Partner Violence: Not on file   Housing Stability: Low Risk  (10/21/2024)    Housing Stability Vital Sign     Unable to Pay for Housing in the Last Year: No     Number of Times Moved in the Last Year: 1     Homeless in the Last Year: No     General:    Alert, cooperative, no distress.   Psychiatric:    Normal Mood and affect    Eye/ENT:      Pupils equal, No asymmetry, Conjunctival pink. Able to hear voice at normal amplitude   Lungs:      Visibly symmetric chest expansion, No palpable tenderness. Clear to auscultation bilaterally.    Heart::    Regular rate and rhythm, S1 normal S2 soft/attenuated but audible, mid peaking midsystolic murmur best heard in right upper sternal border, click, rub or gallop.No JVD, Normal palpable peripheral pulses. No cyanosis   Abdomen:     Soft, non-tender. Bowel sounds normal. No masses,  No      organomegaly.   Extremities:   Extremities normal, atraumatic, no edema.   Neurologic:   CN II-XII grossly intact. No gross focal deficits           ATTENTION:    This medical record was transcribed using an electronic medical records/speech recognition system.  Although proofread, it may and can contain electronic, spelling and other errors.  Corrections may be executed at a later time.  Please feel free to  contact us for any clarifications as needed.      Zach [facility] heart [facility] [place]   [facility], [place], [place]. [phone]

## 2025-05-12 NOTE — PERIOP NOTE
[address] [address]      MAIN PRE OP             [phone]                                                                                AMBULATORY PRE OP          [phone]     PRE-ADMISSION TESTING    [phone]       Surgery Date:  5/15/25       [facility] staff will call you between 4 and 7pm the day before your surgery with your arrival time. (If your surgery is on a Monday, we will call you the Friday before.)    Call [phone] after 7pm Monday-Friday if you did not receive this call.    INSTRUCTIONS BEFORE YOUR SURGERY   When You  Arrive Arrive at [facility] Patient Access on 1st floor the day of your surgery.  Have your insurance card, photo ID,living will/advanced directive/POA (if applicable),  and any copayment (if needed)   Food   and   Drink NO solid food after midnight the night before surgery. You can drink clear liquids from midnight until ONE hour prior to your arrival at the hospital on the day of your surgery. Clear liquids include:  Water  Apple juice (no sediment)  Carbonated beverages  Black coffee(no cream/milk)  Tea(no cream/milk)  Gatorade    No alcohol (beer, wine, liquor) or marijuana (smoking) 24 hours prior to surgery.   No edibles for 3 days prior to surgery.    Stop smoking cigarettes 14 days before surgery (helps w/healing and breathing).   Bathing Clothing  Jewelry  Valuables     When you shower the morning of surgery, please do not apply anything to your skin, such as lotions, powders or makeup, (especially mascara).   Remove fingernail polish except for clear.  Do not shave or trim anywhere 24 hours before surgery  Wear your hair loose or down; no pony-tails, buns, or metal hair clips  Wear loose, comfortable, clean clothes  Wear glasses instead of contacts. Bring a case to keep your glasses safe.  Leave money, valuables, and jewelry, including body piercings, at home  If you use inhalers or CPAP

## 2025-05-13 LAB
EKG ATRIAL RATE: 71 BPM
EKG DIAGNOSIS: NORMAL
EKG Q-T INTERVAL: 452 MS
EKG QRS DURATION: 132 MS
EKG QTC CALCULATION (BAZETT): 488 MS
EKG R AXIS: 40 DEGREES
EKG T AXIS: 62 DEGREES
EKG VENTRICULAR RATE: 70 BPM

## 2025-05-13 PROCEDURE — 93010 ELECTROCARDIOGRAM REPORT: CPT | Performed by: SPECIALIST

## 2025-05-14 ENCOUNTER — ANESTHESIA EVENT (OUTPATIENT)
Facility: HOSPITAL | Age: 89
End: 2025-05-14
Payer: MEDICARE

## 2025-05-15 ENCOUNTER — ANESTHESIA (OUTPATIENT)
Facility: HOSPITAL | Age: 89
End: 2025-05-15
Payer: MEDICARE

## 2025-05-15 ENCOUNTER — HOSPITAL ENCOUNTER (INPATIENT)
Facility: HOSPITAL | Age: 89
LOS: 1 days | Discharge: HOME | DRG: 267 | End: 2025-05-16
Attending: THORACIC SURGERY (CARDIOTHORACIC VASCULAR SURGERY) | Admitting: THORACIC SURGERY (CARDIOTHORACIC VASCULAR SURGERY)
Payer: MEDICARE

## 2025-05-15 ENCOUNTER — APPOINTMENT (OUTPATIENT)
Facility: HOSPITAL | Age: 89
DRG: 267 | End: 2025-05-15
Attending: THORACIC SURGERY (CARDIOTHORACIC VASCULAR SURGERY)
Payer: MEDICARE

## 2025-05-15 ENCOUNTER — APPOINTMENT (OUTPATIENT)
Facility: HOSPITAL | Age: 89
DRG: 267 | End: 2025-05-15
Attending: INTERNAL MEDICINE
Payer: MEDICARE

## 2025-05-15 DIAGNOSIS — I35.0 NONRHEUMATIC AORTIC VALVE STENOSIS: Primary | ICD-10-CM

## 2025-05-15 DIAGNOSIS — I35.0 AORTIC STENOSIS: ICD-10-CM

## 2025-05-15 LAB
ABO + RH BLD: NORMAL
ALBUMIN SERPL-MCNC: 2.9 G/DL (ref 3.5–5)
ALBUMIN/GLOB SERPL: 0.9 (ref 1.1–2.2)
ALP SERPL-CCNC: 61 U/L (ref 45–117)
ALT SERPL-CCNC: 19 U/L (ref 12–78)
ANION GAP SERPL CALC-SCNC: 5 MMOL/L (ref 2–12)
APTT PPP: 28.2 SEC (ref 22.1–31)
AST SERPL-CCNC: 17 U/L (ref 15–37)
BILIRUB SERPL-MCNC: 0.8 MG/DL (ref 0.2–1)
BLD PROD TYP BPU: NORMAL
BLOOD BANK DISPENSE STATUS: NORMAL
BLOOD GROUP ANTIBODIES SERPL: NORMAL
BPU ID: NORMAL
BUN SERPL-MCNC: 22 MG/DL (ref 6–20)
BUN/CREAT SERPL: 17 (ref 12–20)
CALCIUM SERPL-MCNC: 8.5 MG/DL (ref 8.5–10.1)
CHLORIDE SERPL-SCNC: 110 MMOL/L (ref 97–108)
CO2 SERPL-SCNC: 22 MMOL/L (ref 21–32)
CREAT SERPL-MCNC: 1.3 MG/DL (ref 0.7–1.3)
CROSSMATCH RESULT: NORMAL
ERYTHROCYTE [DISTWIDTH] IN BLOOD BY AUTOMATED COUNT: 14.5 % (ref 11.5–14.5)
GLOBULIN SER CALC-MCNC: 3.1 G/DL (ref 2–4)
GLUCOSE SERPL-MCNC: 100 MG/DL (ref 65–100)
HCT VFR BLD AUTO: 35.9 % (ref 36.6–50.3)
HGB BLD-MCNC: 11.9 G/DL (ref 12.1–17)
HISTORY CHECK: NORMAL
INR PPP: 1.2 (ref 0.9–1.1)
MAGNESIUM SERPL-MCNC: 2.1 MG/DL (ref 1.6–2.4)
MCH RBC QN AUTO: 30 PG (ref 26–34)
MCHC RBC AUTO-ENTMCNC: 33.1 G/DL (ref 30–36.5)
MCV RBC AUTO: 90.4 FL (ref 80–99)
NRBC # BLD: 0 K/UL (ref 0–0.01)
NRBC BLD-RTO: 0 PER 100 WBC
NT PRO BNP: 2665 PG/ML
PLATELET # BLD AUTO: 126 K/UL (ref 150–400)
PMV BLD AUTO: 12.4 FL (ref 8.9–12.9)
POTASSIUM SERPL-SCNC: 4.4 MMOL/L (ref 3.5–5.1)
PROT SERPL-MCNC: 6 G/DL (ref 6.4–8.2)
PROTHROMBIN TIME: 12.4 SEC (ref 9.2–11.2)
RBC # BLD AUTO: 3.97 M/UL (ref 4.1–5.7)
SODIUM SERPL-SCNC: 137 MMOL/L (ref 136–145)
SPECIMEN EXP DATE BLD: NORMAL
THERAPEUTIC RANGE: NORMAL SECS (ref 58–77)
UNIT DIVISION: 0
WBC # BLD AUTO: 7.3 K/UL (ref 4.1–11.1)

## 2025-05-15 PROCEDURE — 71045 X-RAY EXAM CHEST 1 VIEW: CPT

## 2025-05-15 PROCEDURE — 6360000002 HC RX W HCPCS

## 2025-05-15 PROCEDURE — 93005 ELECTROCARDIOGRAM TRACING: CPT | Performed by: NURSE PRACTITIONER

## 2025-05-15 PROCEDURE — 02RF38N REPLACEMENT OF AORTIC VALVE WITH ZOOPLASTIC TISSUE, USING RAPID DEPLOYMENT TECHNIQUE, PERCUTANEOUS APPROACH: ICD-10-PCS | Performed by: THORACIC SURGERY (CARDIOTHORACIC VASCULAR SURGERY)

## 2025-05-15 PROCEDURE — C1769 GUIDE WIRE: HCPCS | Performed by: THORACIC SURGERY (CARDIOTHORACIC VASCULAR SURGERY)

## 2025-05-15 PROCEDURE — C1713 ANCHOR/SCREW BN/BN,TIS/BN: HCPCS | Performed by: THORACIC SURGERY (CARDIOTHORACIC VASCULAR SURGERY)

## 2025-05-15 PROCEDURE — 2060000000 HC ICU INTERMEDIATE R&B

## 2025-05-15 PROCEDURE — 85610 PROTHROMBIN TIME: CPT

## 2025-05-15 PROCEDURE — 33361 REPLACE AORTIC VALVE PERQ: CPT | Performed by: INTERNAL MEDICINE

## 2025-05-15 PROCEDURE — 2500000003 HC RX 250 WO HCPCS

## 2025-05-15 PROCEDURE — 6360000002 HC RX W HCPCS: Performed by: INTERNAL MEDICINE

## 2025-05-15 PROCEDURE — 33361 REPLACE AORTIC VALVE PERQ: CPT | Performed by: THORACIC SURGERY (CARDIOTHORACIC VASCULAR SURGERY)

## 2025-05-15 PROCEDURE — 80053 COMPREHEN METABOLIC PANEL: CPT

## 2025-05-15 PROCEDURE — 83880 ASSAY OF NATRIURETIC PEPTIDE: CPT

## 2025-05-15 PROCEDURE — 2580000003 HC RX 258

## 2025-05-15 PROCEDURE — 6370000000 HC RX 637 (ALT 250 FOR IP): Performed by: NURSE PRACTITIONER

## 2025-05-15 PROCEDURE — 6360000004 HC RX CONTRAST MEDICATION: Performed by: INTERNAL MEDICINE

## 2025-05-15 PROCEDURE — 7100000000 HC PACU RECOVERY - FIRST 15 MIN: Performed by: THORACIC SURGERY (CARDIOTHORACIC VASCULAR SURGERY)

## 2025-05-15 PROCEDURE — 2500000003 HC RX 250 WO HCPCS: Performed by: NURSE PRACTITIONER

## 2025-05-15 PROCEDURE — 2580000003 HC RX 258: Performed by: ANESTHESIOLOGY

## 2025-05-15 PROCEDURE — 2709999900 HC NON-CHARGEABLE SUPPLY: Performed by: THORACIC SURGERY (CARDIOTHORACIC VASCULAR SURGERY)

## 2025-05-15 PROCEDURE — 7100000001 HC PACU RECOVERY - ADDTL 15 MIN: Performed by: THORACIC SURGERY (CARDIOTHORACIC VASCULAR SURGERY)

## 2025-05-15 PROCEDURE — 3700000001 HC ADD 15 MINUTES (ANESTHESIA): Performed by: THORACIC SURGERY (CARDIOTHORACIC VASCULAR SURGERY)

## 2025-05-15 PROCEDURE — C1894 INTRO/SHEATH, NON-LASER: HCPCS | Performed by: THORACIC SURGERY (CARDIOTHORACIC VASCULAR SURGERY)

## 2025-05-15 PROCEDURE — 3700000000 HC ANESTHESIA ATTENDED CARE: Performed by: THORACIC SURGERY (CARDIOTHORACIC VASCULAR SURGERY)

## 2025-05-15 PROCEDURE — 93308 TTE F-UP OR LMTD: CPT

## 2025-05-15 PROCEDURE — 4A023N7 MEASUREMENT OF CARDIAC SAMPLING AND PRESSURE, LEFT HEART, PERCUTANEOUS APPROACH: ICD-10-PCS | Performed by: THORACIC SURGERY (CARDIOTHORACIC VASCULAR SURGERY)

## 2025-05-15 PROCEDURE — 3600000018 HC SURGERY OHS ADDTL 15MIN: Performed by: THORACIC SURGERY (CARDIOTHORACIC VASCULAR SURGERY)

## 2025-05-15 PROCEDURE — 3600000008 HC SURGERY OHS BASE: Performed by: THORACIC SURGERY (CARDIOTHORACIC VASCULAR SURGERY)

## 2025-05-15 PROCEDURE — 83735 ASSAY OF MAGNESIUM: CPT

## 2025-05-15 PROCEDURE — C1889 IMPLANT/INSERT DEVICE, NOC: HCPCS | Performed by: THORACIC SURGERY (CARDIOTHORACIC VASCULAR SURGERY)

## 2025-05-15 PROCEDURE — 99223 1ST HOSP IP/OBS HIGH 75: CPT | Performed by: INTERNAL MEDICINE

## 2025-05-15 PROCEDURE — C1760 CLOSURE DEV, VASC: HCPCS | Performed by: THORACIC SURGERY (CARDIOTHORACIC VASCULAR SURGERY)

## 2025-05-15 PROCEDURE — 85027 COMPLETE CBC AUTOMATED: CPT

## 2025-05-15 PROCEDURE — 85730 THROMBOPLASTIN TIME PARTIAL: CPT

## 2025-05-15 DEVICE — VALVE HRT TRANSCATH 29MM SAPIEN 3 ULTRA: Type: IMPLANTABLE DEVICE | Site: AORTIC VALVE | Status: FUNCTIONAL

## 2025-05-15 RX ORDER — SODIUM CHLORIDE 9 MG/ML
INJECTION, SOLUTION INTRAVENOUS CONTINUOUS
Status: DISCONTINUED | OUTPATIENT
Start: 2025-05-15 | End: 2025-05-15 | Stop reason: HOSPADM

## 2025-05-15 RX ORDER — SODIUM CHLORIDE 0.9 % (FLUSH) 0.9 %
5-40 SYRINGE (ML) INJECTION PRN
Status: DISCONTINUED | OUTPATIENT
Start: 2025-05-15 | End: 2025-05-15 | Stop reason: HOSPADM

## 2025-05-15 RX ORDER — BRIMONIDINE TARTRATE 2 MG/ML
1 SOLUTION/ DROPS OPHTHALMIC 2 TIMES DAILY
Status: DISCONTINUED | OUTPATIENT
Start: 2025-05-15 | End: 2025-05-16 | Stop reason: HOSPADM

## 2025-05-15 RX ORDER — ROSUVASTATIN CALCIUM 10 MG/1
5 TABLET, COATED ORAL NIGHTLY
Status: DISCONTINUED | OUTPATIENT
Start: 2025-05-15 | End: 2025-05-16 | Stop reason: HOSPADM

## 2025-05-15 RX ORDER — PROPOFOL 10 MG/ML
INJECTION, EMULSION INTRAVENOUS
Status: DISCONTINUED | OUTPATIENT
Start: 2025-05-15 | End: 2025-05-15 | Stop reason: SDUPTHER

## 2025-05-15 RX ORDER — LATANOPROST 50 UG/ML
1 SOLUTION/ DROPS OPHTHALMIC NIGHTLY
Status: DISCONTINUED | OUTPATIENT
Start: 2025-05-15 | End: 2025-05-16 | Stop reason: HOSPADM

## 2025-05-15 RX ORDER — DORZOLAMIDE HCL 20 MG/ML
1 SOLUTION/ DROPS OPHTHALMIC 2 TIMES DAILY
Status: DISCONTINUED | OUTPATIENT
Start: 2025-05-15 | End: 2025-05-16 | Stop reason: HOSPADM

## 2025-05-15 RX ORDER — SODIUM CHLORIDE 0.9 % (FLUSH) 0.9 %
5-40 SYRINGE (ML) INJECTION EVERY 12 HOURS SCHEDULED
Status: DISCONTINUED | OUTPATIENT
Start: 2025-05-15 | End: 2025-05-15 | Stop reason: HOSPADM

## 2025-05-15 RX ORDER — MIDAZOLAM HYDROCHLORIDE 2 MG/2ML
2 INJECTION, SOLUTION INTRAMUSCULAR; INTRAVENOUS PRN
Status: DISCONTINUED | OUTPATIENT
Start: 2025-05-15 | End: 2025-05-15 | Stop reason: HOSPADM

## 2025-05-15 RX ORDER — DIPHENHYDRAMINE HYDROCHLORIDE 50 MG/ML
12.5 INJECTION, SOLUTION INTRAMUSCULAR; INTRAVENOUS
Status: DISCONTINUED | OUTPATIENT
Start: 2025-05-15 | End: 2025-05-15 | Stop reason: HOSPADM

## 2025-05-15 RX ORDER — SODIUM CHLORIDE 9 MG/ML
INJECTION, SOLUTION INTRAVENOUS PRN
Status: DISCONTINUED | OUTPATIENT
Start: 2025-05-15 | End: 2025-05-16 | Stop reason: HOSPADM

## 2025-05-15 RX ORDER — POLYETHYLENE GLYCOL 3350 17 G/17G
17 POWDER, FOR SOLUTION ORAL DAILY PRN
Status: DISCONTINUED | OUTPATIENT
Start: 2025-05-15 | End: 2025-05-16 | Stop reason: HOSPADM

## 2025-05-15 RX ORDER — ASPIRIN 81 MG/1
81 TABLET, CHEWABLE ORAL DAILY
Status: DISCONTINUED | OUTPATIENT
Start: 2025-05-15 | End: 2025-05-16 | Stop reason: HOSPADM

## 2025-05-15 RX ORDER — SODIUM CHLORIDE 9 MG/ML
INJECTION, SOLUTION INTRAVENOUS PRN
Status: DISCONTINUED | OUTPATIENT
Start: 2025-05-15 | End: 2025-05-15 | Stop reason: HOSPADM

## 2025-05-15 RX ORDER — DEXAMETHASONE SODIUM PHOSPHATE 4 MG/ML
INJECTION, SOLUTION INTRA-ARTICULAR; INTRALESIONAL; INTRAMUSCULAR; INTRAVENOUS; SOFT TISSUE
Status: DISCONTINUED | OUTPATIENT
Start: 2025-05-15 | End: 2025-05-15 | Stop reason: SDUPTHER

## 2025-05-15 RX ORDER — HYDROMORPHONE HYDROCHLORIDE 1 MG/ML
0.5 INJECTION, SOLUTION INTRAMUSCULAR; INTRAVENOUS; SUBCUTANEOUS EVERY 5 MIN PRN
Status: DISCONTINUED | OUTPATIENT
Start: 2025-05-15 | End: 2025-05-15 | Stop reason: HOSPADM

## 2025-05-15 RX ORDER — LIDOCAINE HYDROCHLORIDE 20 MG/ML
INJECTION, SOLUTION EPIDURAL; INFILTRATION; INTRACAUDAL; PERINEURAL
Status: DISCONTINUED | OUTPATIENT
Start: 2025-05-15 | End: 2025-05-15 | Stop reason: SDUPTHER

## 2025-05-15 RX ORDER — FENTANYL CITRATE 50 UG/ML
50 INJECTION, SOLUTION INTRAMUSCULAR; INTRAVENOUS PRN
Status: DISCONTINUED | OUTPATIENT
Start: 2025-05-15 | End: 2025-05-15 | Stop reason: HOSPADM

## 2025-05-15 RX ORDER — FENTANYL CITRATE 50 UG/ML
25 INJECTION, SOLUTION INTRAMUSCULAR; INTRAVENOUS PRN
Status: DISCONTINUED | OUTPATIENT
Start: 2025-05-15 | End: 2025-05-15 | Stop reason: HOSPADM

## 2025-05-15 RX ORDER — SODIUM CHLORIDE, SODIUM LACTATE, POTASSIUM CHLORIDE, CALCIUM CHLORIDE 600; 310; 30; 20 MG/100ML; MG/100ML; MG/100ML; MG/100ML
INJECTION, SOLUTION INTRAVENOUS CONTINUOUS
Status: DISCONTINUED | OUTPATIENT
Start: 2025-05-15 | End: 2025-05-15 | Stop reason: HOSPADM

## 2025-05-15 RX ORDER — ACETAMINOPHEN 325 MG/1
650 TABLET ORAL EVERY 4 HOURS PRN
Status: DISCONTINUED | OUTPATIENT
Start: 2025-05-15 | End: 2025-05-16 | Stop reason: HOSPADM

## 2025-05-15 RX ORDER — EPHEDRINE SULFATE 50 MG/ML
INJECTION INTRAVENOUS
Status: DISCONTINUED | OUTPATIENT
Start: 2025-05-15 | End: 2025-05-15 | Stop reason: SDUPTHER

## 2025-05-15 RX ORDER — HYDRALAZINE HYDROCHLORIDE 20 MG/ML
10 INJECTION INTRAMUSCULAR; INTRAVENOUS EVERY 4 HOURS PRN
Status: DISCONTINUED | OUTPATIENT
Start: 2025-05-15 | End: 2025-05-16 | Stop reason: HOSPADM

## 2025-05-15 RX ORDER — PROTAMINE SULFATE 10 MG/ML
INJECTION, SOLUTION INTRAVENOUS
Status: DISCONTINUED | OUTPATIENT
Start: 2025-05-15 | End: 2025-05-15 | Stop reason: SDUPTHER

## 2025-05-15 RX ORDER — LIDOCAINE HYDROCHLORIDE 10 MG/ML
1 INJECTION, SOLUTION EPIDURAL; INFILTRATION; INTRACAUDAL; PERINEURAL
Status: DISCONTINUED | OUTPATIENT
Start: 2025-05-15 | End: 2025-05-15 | Stop reason: HOSPADM

## 2025-05-15 RX ORDER — LABETALOL HYDROCHLORIDE 5 MG/ML
10 INJECTION, SOLUTION INTRAVENOUS
Status: DISCONTINUED | OUTPATIENT
Start: 2025-05-15 | End: 2025-05-15 | Stop reason: HOSPADM

## 2025-05-15 RX ORDER — ONDANSETRON 2 MG/ML
4 INJECTION INTRAMUSCULAR; INTRAVENOUS
Status: DISCONTINUED | OUTPATIENT
Start: 2025-05-15 | End: 2025-05-15 | Stop reason: HOSPADM

## 2025-05-15 RX ORDER — ONDANSETRON 4 MG/1
4 TABLET, ORALLY DISINTEGRATING ORAL EVERY 8 HOURS PRN
Status: DISCONTINUED | OUTPATIENT
Start: 2025-05-15 | End: 2025-05-16 | Stop reason: HOSPADM

## 2025-05-15 RX ORDER — ONDANSETRON 2 MG/ML
4 INJECTION INTRAMUSCULAR; INTRAVENOUS EVERY 6 HOURS PRN
Status: DISCONTINUED | OUTPATIENT
Start: 2025-05-15 | End: 2025-05-16 | Stop reason: HOSPADM

## 2025-05-15 RX ORDER — ONDANSETRON 2 MG/ML
INJECTION INTRAMUSCULAR; INTRAVENOUS
Status: DISCONTINUED | OUTPATIENT
Start: 2025-05-15 | End: 2025-05-15 | Stop reason: SDUPTHER

## 2025-05-15 RX ORDER — CEFAZOLIN SODIUM 1 G/3ML
INJECTION, POWDER, FOR SOLUTION INTRAMUSCULAR; INTRAVENOUS
Status: DISCONTINUED | OUTPATIENT
Start: 2025-05-15 | End: 2025-05-15 | Stop reason: SDUPTHER

## 2025-05-15 RX ORDER — FENTANYL CITRATE 50 UG/ML
25 INJECTION, SOLUTION INTRAMUSCULAR; INTRAVENOUS EVERY 5 MIN PRN
Status: DISCONTINUED | OUTPATIENT
Start: 2025-05-15 | End: 2025-05-15 | Stop reason: HOSPADM

## 2025-05-15 RX ORDER — IOPAMIDOL 612 MG/ML
INJECTION, SOLUTION INTRAVASCULAR PRN
Status: DISCONTINUED | OUTPATIENT
Start: 2025-05-15 | End: 2025-05-15 | Stop reason: HOSPADM

## 2025-05-15 RX ORDER — SODIUM CHLORIDE, SODIUM LACTATE, POTASSIUM CHLORIDE, CALCIUM CHLORIDE 600; 310; 30; 20 MG/100ML; MG/100ML; MG/100ML; MG/100ML
INJECTION, SOLUTION INTRAVENOUS
Status: DISCONTINUED | OUTPATIENT
Start: 2025-05-15 | End: 2025-05-15 | Stop reason: SDUPTHER

## 2025-05-15 RX ORDER — SODIUM CHLORIDE 0.9 % (FLUSH) 0.9 %
5-40 SYRINGE (ML) INJECTION EVERY 12 HOURS SCHEDULED
Status: DISCONTINUED | OUTPATIENT
Start: 2025-05-15 | End: 2025-05-16 | Stop reason: HOSPADM

## 2025-05-15 RX ORDER — SODIUM CHLORIDE 0.9 % (FLUSH) 0.9 %
5-40 SYRINGE (ML) INJECTION PRN
Status: DISCONTINUED | OUTPATIENT
Start: 2025-05-15 | End: 2025-05-16 | Stop reason: HOSPADM

## 2025-05-15 RX ORDER — LOSARTAN POTASSIUM 25 MG/1
25 TABLET ORAL DAILY
Status: DISCONTINUED | OUTPATIENT
Start: 2025-05-16 | End: 2025-05-16 | Stop reason: HOSPADM

## 2025-05-15 RX ORDER — PROCHLORPERAZINE EDISYLATE 5 MG/ML
5 INJECTION INTRAMUSCULAR; INTRAVENOUS
Status: DISCONTINUED | OUTPATIENT
Start: 2025-05-15 | End: 2025-05-15 | Stop reason: HOSPADM

## 2025-05-15 RX ORDER — MIDAZOLAM HYDROCHLORIDE 2 MG/2ML
2 INJECTION, SOLUTION INTRAMUSCULAR; INTRAVENOUS
Status: DISCONTINUED | OUTPATIENT
Start: 2025-05-15 | End: 2025-05-15 | Stop reason: HOSPADM

## 2025-05-15 RX ORDER — HEPARIN SODIUM 1000 [USP'U]/ML
INJECTION, SOLUTION INTRAVENOUS; SUBCUTANEOUS PRN
Status: DISCONTINUED | OUTPATIENT
Start: 2025-05-15 | End: 2025-05-15 | Stop reason: HOSPADM

## 2025-05-15 RX ORDER — NALOXONE HYDROCHLORIDE 0.4 MG/ML
INJECTION, SOLUTION INTRAMUSCULAR; INTRAVENOUS; SUBCUTANEOUS PRN
Status: DISCONTINUED | OUTPATIENT
Start: 2025-05-15 | End: 2025-05-15 | Stop reason: HOSPADM

## 2025-05-15 RX ADMIN — PHENYLEPHRINE HYDROCHLORIDE 120 MCG: 10 INJECTION INTRAVENOUS at 11:32

## 2025-05-15 RX ADMIN — CEFAZOLIN 2 G: 1 INJECTION, POWDER, FOR SOLUTION INTRAMUSCULAR; INTRAVENOUS at 10:53

## 2025-05-15 RX ADMIN — SODIUM CHLORIDE, POTASSIUM CHLORIDE, SODIUM LACTATE AND CALCIUM CHLORIDE: 600; 310; 30; 20 INJECTION, SOLUTION INTRAVENOUS at 10:15

## 2025-05-15 RX ADMIN — ONDANSETRON 4 MG: 2 INJECTION, SOLUTION INTRAMUSCULAR; INTRAVENOUS at 11:37

## 2025-05-15 RX ADMIN — PROPOFOL 100 MCG/KG/MIN: 10 INJECTION, EMULSION INTRAVENOUS at 10:25

## 2025-05-15 RX ADMIN — DORZOLAMIDE HYDROCHLORIDE 1 DROP: 20 SOLUTION/ DROPS OPHTHALMIC at 20:42

## 2025-05-15 RX ADMIN — BRIMONIDINE TARTRATE 1 DROP: 2 SOLUTION OPHTHALMIC at 20:44

## 2025-05-15 RX ADMIN — PHENYLEPHRINE HYDROCHLORIDE 80 MCG: 10 INJECTION INTRAVENOUS at 12:16

## 2025-05-15 RX ADMIN — PROPOFOL 50 MG: 10 INJECTION, EMULSION INTRAVENOUS at 10:24

## 2025-05-15 RX ADMIN — ASPIRIN 81 MG CHEWABLE TABLET 81 MG: 81 TABLET CHEWABLE at 17:25

## 2025-05-15 RX ADMIN — LIDOCAINE HYDROCHLORIDE 100 MG: 20 INJECTION, SOLUTION EPIDURAL; INFILTRATION; INTRACAUDAL; PERINEURAL at 10:25

## 2025-05-15 RX ADMIN — DEXAMETHASONE SODIUM PHOSPHATE 4 MG: 4 INJECTION INTRA-ARTICULAR; INTRALESIONAL; INTRAMUSCULAR; INTRAVENOUS; SOFT TISSUE at 10:53

## 2025-05-15 RX ADMIN — SODIUM CHLORIDE, PRESERVATIVE FREE 10 ML: 5 INJECTION INTRAVENOUS at 20:45

## 2025-05-15 RX ADMIN — EPHEDRINE SULFATE 50 MG: 50 INJECTION INTRAVENOUS at 12:14

## 2025-05-15 RX ADMIN — PROTAMINE SULFATE 100 MG: 10 INJECTION, SOLUTION INTRAVENOUS at 11:36

## 2025-05-15 RX ADMIN — PHENYLEPHRINE HYDROCHLORIDE 20 MCG/MIN: 10 INJECTION INTRAVENOUS at 10:32

## 2025-05-15 RX ADMIN — ROSUVASTATIN 5 MG: 10 TABLET, FILM COATED ORAL at 20:40

## 2025-05-15 RX ADMIN — PHENYLEPHRINE HYDROCHLORIDE 80 MCG: 10 INJECTION INTRAVENOUS at 10:36

## 2025-05-15 RX ADMIN — LATANOPROST 1 DROP: 50 SOLUTION OPHTHALMIC at 20:44

## 2025-05-15 RX ADMIN — PHENYLEPHRINE HYDROCHLORIDE 120 MCG: 10 INJECTION INTRAVENOUS at 12:01

## 2025-05-15 RX ADMIN — DEXMEDETOMIDINE 0.2 MCG/KG/HR: 100 INJECTION, SOLUTION, CONCENTRATE INTRAVENOUS at 10:25

## 2025-05-15 ASSESSMENT — PAIN SCALES - WONG BAKER: WONGBAKER_NUMERICALRESPONSE: NO HURT

## 2025-05-15 ASSESSMENT — ENCOUNTER SYMPTOMS: SHORTNESS OF BREATH: 1

## 2025-05-15 NOTE — PROGRESS NOTES
Cardiac Surgery Coordinator- Unable to locate the family of Panchito Crespo. Placed update call to the family of Panchito Crespo  Provided family with an update from OR. Encouraged family to verbalize and emotional support given. Will continue to update throughout the day.    1210- Cardiac Surgery Coordinator- Met with the family of Panchito Crespo, Provided family with an update from OR. Will continue to update throughout the day.

## 2025-05-15 NOTE — ANESTHESIA PRE PROCEDURE
Department of Anesthesiology  Preprocedure Note       Name:  Panchito Crespo   Age:  89 y.o.  :  1933                                          MRN:  [id number]         Date:  5/15/2025      Surgeon: Surgeon(s):  Ty Gipson MD Kaushik, Manu, MD    Procedure: Procedure(s):  TRANSCATHETER AORTIC VALVE REPLACEMENT (RIGHT TRANSFERMORAL 29S3)  .    Medications prior to admission:   Prior to Admission medications    Medication Sig Start Date End Date Taking? Authorizing Provider   metoprolol succinate (TOPROL XL) 50 MG extended release tablet Take 1 tablet by mouth in the morning and at bedtime  Patient taking differently: Take 1 tablet by mouth at bedtime 25  Yes Corbin Stubbs MD   losartan (COZAAR) 25 MG tablet Take 1 tablet by mouth once daily 24  Yes Corbin Stubbs MD   Multiple Vitamins-Minerals (PRESERVISION AREDS PO) Take 1 tablet by mouth 2 times daily   Yes Provider, MD Angelito   brimonidine (ALPHAGAN) 0.2 % ophthalmic solution Place 1 drop into the right eye 2 times daily   Yes Automatic Reconciliation, Ar   dorzolamide (TRUSOPT) 2 % ophthalmic solution Place 1 drop into the right eye 2 times daily 20  Yes Automatic Reconciliation, Ar   latanoprost (XALATAN) 0.005 % ophthalmic solution Place 1 drop into the right eye nightly   Yes Automatic Reconciliation, Ar   rosuvastatin (CRESTOR) 10 MG tablet Take 0.5 tablets by mouth at bedtime   Yes Automatic Reconciliation, Ar   aspirin 81 MG chewable tablet Take 1 tablet by mouth daily 10/23/24   Guero Marina MD   omeprazole (PRILOSEC) 20 MG delayed release capsule Take 1 capsule by mouth daily    Automatic Reconciliation, Ar   rivaroxaban (XARELTO) 20 MG TABS tablet Take 1 tablet by mouth daily (with breakfast) 20   Automatic Reconciliation, Ar       Current medications:    Current Facility-Administered Medications   Medication Dose Route Frequency Provider Last Rate Last Admin   • lidocaine PF 1 % injection 1 mL  1 mL IntraDERmal

## 2025-05-15 NOTE — OP NOTE
Operative Note      Patient: Panchito Crespo  YOB: 1933  MRN: [id number]    Date of Procedure: 5/15/2025    Pre-Op Diagnosis Codes:      * Aortic stenosis [I35.0]    Post-Op Diagnosis: Same       Procedure(s):  TRANSCATHETER AORTIC VALVE REPLACEMENT (RIGHT TRANSFERMORAL 29S3)  .    Implants:  Implant Name Type Inv. Item Serial No.  Lot No. LRB No. Used Action   VALVE HRT TRANSCATH 29MM HUNG 3 ULTRA - [id number] Aortic valves VALVE HRT TRANSCATH 29MM HUNG 3 ULTRA [id number] [employer]- N/A N/A 1 Implanted     PROCEDURALISTS:?   Surgeon 1. Milind Young MD?  CoSurgeon 1.  Errol Gipson MD    PROCEDURES:  1. Angiography of the ascending aorta and aortoiliac bifurcation  2. Temporary transvenous pacemaker insertion  3. Left heart catheterization  4. Implantation of a 29 mm Moreno S3 transcatheter aortic valve via the left transfemoral approach  5. 16F right?femoral artery access with Perclose pre-closure / 6F left femoral vein access with manual compression / 6F left femoral artery access with Perclose closure    INDICATIONS:?Panchito Crespo is a 89 y.o. ?old male  with severe symptomatic aortic stenosis, NYHA Class iii symptom status.? He is referred for transfemoral TAVR procedure. He was deemed as prohibitive risk for SAVR.    PRE-OP DIAGNOSIS:  1. Severe, symptomatic aortic stenosis (NYHA, Class III)  2.  Known coronary artery disease status post coronary bypass grafting  3.  No significant cardiomyopathy    POST-OP DIAGNOSIS:  1. Status post successful implantation of a 29 mm Moreno S3 transcatheter Aortic valve via the left transfemoral approach  2. No significant post operative conduction block    PROCEDURAL DETAILS: The risks (death, myocardial infarction, stroke, bleeding, vascular complications, renal dysfunction, allergic reactions, and other), benefits, and alternatives were explained and discussed. Signed, informed consent was obtained. The patient was

## 2025-05-15 NOTE — ANESTHESIA POSTPROCEDURE EVALUATION
Post-Anesthesia Evaluation and Assessment    Patient: Panchito Crespo MRN: [id number]  SSN: [id number]    YOB: 1933  Age: 89 y.o.  Sex: male      I have evaluated the patient and they are stable and ready for discharge from the PACU.     Cardiovascular Function/Vital Signs  Visit Vitals  BP (!) 153/89   Pulse 74   Temp 98.6 °F (37 °C) (Axillary)   Resp 22   SpO2 97%       Patient is status post Monitor Anesthesia Care anesthesia for Procedure(s):  TRANSCATHETER AORTIC VALVE REPLACEMENT (RIGHT TRANSFERMORAL 29S3)  ..    Nausea/Vomiting: None    Postoperative hydration reviewed and adequate.    Pain:      Managed    Neurological Status:       At baseline    Mental Status, Level of Consciousness: Arousable    Pulmonary Status:       Adequate oxygenation and airway patent    Complications related to anesthesia: None    Post-anesthesia assessment completed. No concerns    Signed By: Corbin Hobson MD     May 15, 2025

## 2025-05-15 NOTE — ANESTHESIA PROCEDURE NOTES
Arterial Line:    An arterial line was placed using ultrasound guidance, in the pre-op for the following indication(s): continuous blood pressure monitoring and blood sampling needed.    A 20 gauge (size), 1 and 3/8 inch (length), Arrow (type) catheter was placed, Seldinger technique used, into the left radial artery, secured by tape and Tegaderm.  Anesthesia type: Local  Local infiltration: Injection    Events:  patient tolerated procedure well with no complications.5/15/2025 9:49 AM5/15/2025 9:54 AM  Performed: Anesthesiologist   Preanesthetic Checklist  Completed: patient identified, IV checked, site marked, risks and benefits discussed, surgical/procedural consents, equipment checked, pre-op evaluation, timeout performed, anesthesia consent given, oxygen available and monitors applied/VS acknowledged

## 2025-05-15 NOTE — CONSULTS
Dr. Young                                  [facility].  [phone]               Cardiology structural heart disease consult/Progress Note        Requesting/referring provider: Srinivasan Padilla PA, Dr. Stubbs      Reason for Consult: Aortic stenosis      Assessment/Plan:  1.  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  2.  Coronary disease, s/p coronary bypass grafting.  3.  Dual-chamber pacemaker  placement for sinus node dysfunction  4.  Hypertension  5.  Hyperlipidemia  6.  Severe aortic stenosis with minimal symptoms  7.  History of cardiomyopathy multiple factors including RV pacing and coronary  artery disease    Mr. Campoverde is seen at the request of Dr. Stubbs.  He has minimal shortness of breath.  He does have aortic stenosis which clinically is now severe.  His echocardiogram that was reviewed from past month also is consistent with severe aortic sinus with calculated aortic valve area of 0.8 cm.  Peak velocity velocity of 3.7 to 3.9 m/s.    Over the past few months he has been of increasing shortness of breath.  Most recent echocardiogram for 6 months were shown clear severe aortic stenosis.  He also has high PVC burden.  He was recently met his electrophysiologsst who felt that his symptoms as well as PVC burden are increased in the setting of severe aortic stenosis.  For that reason we will plan proceeding with AVR.  He is 89 years old and is not a good candidate for redo open heart surgery.  His case will be discussed in multidisciplinary case conference and if suitable will prefer to consider transcatheter options over surgical options.    Case was discussed in multidisciplinary case conference it was felt that he will be best suited for transcatheter aortic valve replacement.I informed the patient that in the absence of any definitive therapy severe symptomatic aortic stenosis confers a 50% 2 to 5-year mortality.  I specifically    Occupational History    Not on file   Tobacco Use    Smoking status: Never    Smokeless tobacco: Never   Vaping Use    Vaping status: Never Used   Substance and Sexual Activity    Alcohol use: Never    Drug use: No    Sexual activity: Not on file   Other Topics Concern    Not on file   Social History Narrative    Not on file     Social Drivers of Health     Financial Resource Strain: Not on file   Food Insecurity: No Food Insecurity (10/21/2024)    Hunger Vital Sign     Worried About Running Out of Food in the Last Year: Never true     Ran Out of Food in the Last Year: Never true   Transportation Needs: No Transportation Needs (10/21/2024)    PRAPARE - Transportation     Lack of Transportation (Medical): No     Lack of Transportation (Non-Medical): No   Physical Activity: Not on file   Stress: Not on file   Social Connections: Not on file   Intimate Partner Violence: Not on file   Housing Stability: Low Risk  (10/21/2024)    Housing Stability Vital Sign     Unable to Pay for Housing in the Last Year: No     Number of Times Moved in the Last Year: 1     Homeless in the Last Year: No     General:    Alert, cooperative, no distress.   Psychiatric:    Normal Mood and affect    Eye/ENT:      Pupils equal, No asymmetry, Conjunctival pink. Able to hear voice at normal amplitude   Lungs:      Visibly symmetric chest expansion, No palpable tenderness. Clear to auscultation bilaterally.    Heart::    Regular rate and rhythm, S1 normal S2 soft/attenuated but audible, mid peaking midsystolic murmur best heard in right upper sternal border, click, rub or gallop.No JVD, Normal palpable peripheral pulses. No cyanosis   Abdomen:     Soft, non-tender. Bowel sounds normal. No masses,  No      organomegaly.   Extremities:   Extremities normal, atraumatic, no edema.   Neurologic:   CN II-XII grossly intact. No gross focal deficits               ATTENTION:    This medical record was transcribed using an electronic medical  records/speech recognition system.  Although proofread, it may and can contain electronic, spelling and other errors.  Corrections may be executed at a later time.  Please feel free to  contact us for any clarifications as needed.      [facility] [place]   CAV, [facility],   [place], [place]. [phone]

## 2025-05-16 ENCOUNTER — APPOINTMENT (OUTPATIENT)
Facility: HOSPITAL | Age: 89
DRG: 267 | End: 2025-05-16
Attending: THORACIC SURGERY (CARDIOTHORACIC VASCULAR SURGERY)
Payer: MEDICARE

## 2025-05-16 VITALS
SYSTOLIC BLOOD PRESSURE: 134 MMHG | OXYGEN SATURATION: 97 % | WEIGHT: 190.48 LBS | HEIGHT: 70 IN | DIASTOLIC BLOOD PRESSURE: 61 MMHG | TEMPERATURE: 98.1 F | HEART RATE: 85 BPM | RESPIRATION RATE: 22 BRPM | BODY MASS INDEX: 27.27 KG/M2

## 2025-05-16 DIAGNOSIS — Z95.4 S/P AORTIC VALVE ALLOGRAFT: Primary | ICD-10-CM

## 2025-05-16 PROBLEM — I35.0 NON-RHEUMATIC AORTIC STENOSIS: Status: RESOLVED | Noted: 2025-05-15 | Resolved: 2025-05-16

## 2025-05-16 PROBLEM — I35.0 AORTIC STENOSIS: Status: RESOLVED | Noted: 2025-05-08 | Resolved: 2025-05-16

## 2025-05-16 PROBLEM — Z95.2 S/P TAVR (TRANSCATHETER AORTIC VALVE REPLACEMENT): Status: ACTIVE | Noted: 2025-05-16

## 2025-05-16 LAB
ANION GAP SERPL CALC-SCNC: 8 MMOL/L (ref 2–12)
BUN SERPL-MCNC: 25 MG/DL (ref 6–20)
BUN/CREAT SERPL: 20 (ref 12–20)
CALCIUM SERPL-MCNC: 8.7 MG/DL (ref 8.5–10.1)
CHLORIDE SERPL-SCNC: 110 MMOL/L (ref 97–108)
CO2 SERPL-SCNC: 20 MMOL/L (ref 21–32)
CREAT SERPL-MCNC: 1.26 MG/DL (ref 0.7–1.3)
ECHO AV AREA PEAK VELOCITY: 1.6 CM2
ECHO AV AREA VTI: 1.7 CM2
ECHO AV AREA/BSA PEAK VELOCITY: 0.8 CM2/M2
ECHO AV AREA/BSA VTI: 0.8 CM2/M2
ECHO AV MEAN GRADIENT: 8 MMHG
ECHO AV MEAN VELOCITY: 1.4 M/S
ECHO AV PEAK GRADIENT: 15 MMHG
ECHO AV PEAK VELOCITY: 2 M/S
ECHO AV VELOCITY RATIO: 0.5
ECHO AV VTI: 37.7 CM
ECHO BSA: 2.07 M2
ECHO EST RA PRESSURE: 3 MMHG
ECHO LA DIAMETER INDEX: 2.45 CM/M2
ECHO LA DIAMETER: 5 CM
ECHO LV EF PHYSICIAN: 72 %
ECHO LV FRACTIONAL SHORTENING: 26 % (ref 28–44)
ECHO LV INTERNAL DIMENSION DIASTOLE INDEX: 2.45 CM/M2
ECHO LV INTERNAL DIMENSION DIASTOLIC: 5 CM (ref 4.2–5.9)
ECHO LV INTERNAL DIMENSION SYSTOLIC INDEX: 1.81 CM/M2
ECHO LV INTERNAL DIMENSION SYSTOLIC: 3.7 CM
ECHO LV IVSD: 1.2 CM (ref 0.6–1)
ECHO LV MASS 2D: 207.1 G (ref 88–224)
ECHO LV MASS INDEX 2D: 101.5 G/M2 (ref 49–115)
ECHO LV POSTERIOR WALL DIASTOLIC: 1 CM (ref 0.6–1)
ECHO LV RELATIVE WALL THICKNESS RATIO: 0.4
ECHO LVOT AREA: 3.1 CM2
ECHO LVOT AV VTI INDEX: 0.54
ECHO LVOT DIAM: 2 CM
ECHO LVOT MEAN GRADIENT: 2 MMHG
ECHO LVOT PEAK GRADIENT: 4 MMHG
ECHO LVOT PEAK VELOCITY: 1 M/S
ECHO LVOT STROKE VOLUME INDEX: 31.4 ML/M2
ECHO LVOT SV: 64.1 ML
ECHO LVOT VTI: 20.4 CM
ECHO MV E VELOCITY: 0.84 M/S
ECHO RIGHT VENTRICULAR SYSTOLIC PRESSURE (RVSP): 30 MMHG
ECHO RV TAPSE: 1.6 CM (ref 1.7–?)
ECHO TV REGURGITANT MAX VELOCITY: 2.6 M/S
ECHO TV REGURGITANT PEAK GRADIENT: 27 MMHG
EKG ATRIAL RATE: 70 BPM
EKG ATRIAL RATE: 88 BPM
EKG DIAGNOSIS: NORMAL
EKG DIAGNOSIS: NORMAL
EKG Q-T INTERVAL: 488 MS
EKG Q-T INTERVAL: 490 MS
EKG QRS DURATION: 142 MS
EKG QRS DURATION: 148 MS
EKG QTC CALCULATION (BAZETT): 527 MS
EKG QTC CALCULATION (BAZETT): 529 MS
EKG R AXIS: 36 DEGREES
EKG R AXIS: 63 DEGREES
EKG T AXIS: 84 DEGREES
EKG T AXIS: 86 DEGREES
EKG VENTRICULAR RATE: 70 BPM
EKG VENTRICULAR RATE: 70 BPM
ERYTHROCYTE [DISTWIDTH] IN BLOOD BY AUTOMATED COUNT: 14.7 % (ref 11.5–14.5)
GLUCOSE SERPL-MCNC: 126 MG/DL (ref 65–100)
HCT VFR BLD AUTO: 38.6 % (ref 36.6–50.3)
HGB BLD-MCNC: 12.3 G/DL (ref 12.1–17)
MCH RBC QN AUTO: 29.4 PG (ref 26–34)
MCHC RBC AUTO-ENTMCNC: 31.9 G/DL (ref 30–36.5)
MCV RBC AUTO: 92.3 FL (ref 80–99)
NRBC # BLD: 0 K/UL (ref 0–0.01)
NRBC BLD-RTO: 0 PER 100 WBC
PLATELET # BLD AUTO: 137 K/UL (ref 150–400)
PMV BLD AUTO: 12.4 FL (ref 8.9–12.9)
POTASSIUM SERPL-SCNC: 4.5 MMOL/L (ref 3.5–5.1)
RBC # BLD AUTO: 4.18 M/UL (ref 4.1–5.7)
SODIUM SERPL-SCNC: 138 MMOL/L (ref 136–145)
WBC # BLD AUTO: 9.5 K/UL (ref 4.1–11.1)

## 2025-05-16 PROCEDURE — 93306 TTE W/DOPPLER COMPLETE: CPT | Performed by: INTERNAL MEDICINE

## 2025-05-16 PROCEDURE — 93325 DOPPLER ECHO COLOR FLOW MAPG: CPT | Performed by: INTERNAL MEDICINE

## 2025-05-16 PROCEDURE — 85027 COMPLETE CBC AUTOMATED: CPT

## 2025-05-16 PROCEDURE — 2500000003 HC RX 250 WO HCPCS: Performed by: NURSE PRACTITIONER

## 2025-05-16 PROCEDURE — 93010 ELECTROCARDIOGRAM REPORT: CPT | Performed by: SPECIALIST

## 2025-05-16 PROCEDURE — APPSS45 APP SPLIT SHARED TIME 31-45 MINUTES: Performed by: NURSE PRACTITIONER

## 2025-05-16 PROCEDURE — 93005 ELECTROCARDIOGRAM TRACING: CPT | Performed by: NURSE PRACTITIONER

## 2025-05-16 PROCEDURE — 80048 BASIC METABOLIC PNL TOTAL CA: CPT

## 2025-05-16 PROCEDURE — 93308 TTE F-UP OR LMTD: CPT | Performed by: INTERNAL MEDICINE

## 2025-05-16 PROCEDURE — 93306 TTE W/DOPPLER COMPLETE: CPT

## 2025-05-16 PROCEDURE — 6370000000 HC RX 637 (ALT 250 FOR IP): Performed by: NURSE PRACTITIONER

## 2025-05-16 RX ORDER — ACETAMINOPHEN 325 MG/1
650 TABLET ORAL EVERY 4 HOURS PRN
Status: SHIPPED | COMMUNITY
Start: 2025-05-16

## 2025-05-16 RX ORDER — METOPROLOL SUCCINATE 50 MG/1
50 TABLET, EXTENDED RELEASE ORAL NIGHTLY
Status: DISCONTINUED | OUTPATIENT
Start: 2025-05-16 | End: 2025-05-16 | Stop reason: HOSPADM

## 2025-05-16 RX ADMIN — BRIMONIDINE TARTRATE 1 DROP: 2 SOLUTION OPHTHALMIC at 08:49

## 2025-05-16 RX ADMIN — SODIUM CHLORIDE, PRESERVATIVE FREE 10 ML: 5 INJECTION INTRAVENOUS at 08:27

## 2025-05-16 RX ADMIN — RIVAROXABAN 15 MG: 15 TABLET, FILM COATED ORAL at 08:47

## 2025-05-16 RX ADMIN — ASPIRIN 81 MG CHEWABLE TABLET 81 MG: 81 TABLET CHEWABLE at 08:48

## 2025-05-16 RX ADMIN — LOSARTAN POTASSIUM 25 MG: 25 TABLET, FILM COATED ORAL at 08:48

## 2025-05-16 RX ADMIN — DORZOLAMIDE HYDROCHLORIDE 1 DROP: 20 SOLUTION/ DROPS OPHTHALMIC at 08:49

## 2025-05-16 NOTE — DISCHARGE INSTRUCTIONS
Cardiac Surgery Specialist    [address]       [address]      [place] [address]                                       [place] [address]  Office- [phone]  Fax- [phone]       Office- [phone]  Fax- [phone]  _____________________________________________________________  Dr. Jose De Jesus Gardiner, NP    Jazzmine Green, PAHARRIET Howell, NP  Dr. Pb Mcmahan,BETYS Cuello, BETSY Hopper, BETSY Montana, AMMON Hewitt, BETSY Clemens, BETSY  ______________________________________________________________     Name:Panchito Crespo     Surgery & Date: Procedure(s):  TRANSCATHETER AORTIC VALVE REPLACEMENT (RIGHT TRANSFERMORAL 29S3)  .    Discharge Date: 05/16/25     MEDICATIONS:  Please refer to your After Visit Summary for your medication list.       DO NOT TAKE ANY MEDICATIONS THAT ARE NOT ON THIS LIST    INSTRUCTIONS:  NO SMOKING OR TOBACCO PRODUCTS  Do not follow the activity/exercise instructions in your discharge book given to you as an inpatient. You have no activity restrictions.   You may shower.  Wash all incisions twice daily with mild soap and water.  No lotions, ointments or powder.  Call the office immediately for any redness, swelling, or drainage from your incision.   Take your temperature daily and call for a temperature of 101 degrees or higher or for any symptoms that make you think you have and infection.  Weigh yourself each morning.  Call if you gain more than 5 pounds in 48 hours.  Use the incentive spirometer 6-8 times a day-10 breaths each time.    Walk several hundred feet several times daily.    DIET  Eat an American Heart Association diet.  If you are having trouble with your appetite, eat what you can.  Try eating small, frequent meals throughout the day.        ACTIVITY  1. You have no  activity restrictions. You may resume your daily activities at home, based on your comfort level. You may also drive.          FOLLOW UP  Your first follow up appointment will be on 5/19/25 at 10:30 am BY TELEPHONE. Our office is located in [facility] [place] [address]. Your second follow up appointment will be in four weeks, on 6/26/25 at 9:30 am. You will need to have an ECHO prior to your appointment time. Our office will set that up for you. You will also have a 1 year follow up in the valve clinic on 5/7/26 at 10:30 am.  Please call our office at [phone] if you are unable to make either one of these appointments.   You will be receiving a call before your 3 day follow up appointment to begin cardiac rehab. They are programs located at the [facility] [place], [facility].  The contact information is located in your Cardiac Surgery booklet.  Please call if you have not been contacted 2-3 weeks after discharge from the hospital.  We will make an appointment for you with your cardiologist in 4-5 weeks.  Consult you primary care physician regarding your influenza &   pneumovax vaccines.        5.   Please bring all medications with you to your appointment.  Future Appointments   Date Time Provider Department Center   5/19/2025 10:30 AM Lilli Hopper, APRN - NP [facility] BS AMB   6/26/2025  8:15 AM [facility] ECHO LAB 1 [facility]   6/26/2025  9:30 AM Ty Gipson MD [facility] BS AMB   10/8/2025  1:00 PM Corbin Stubbs MD [facility] BS AMB   10/8/2025  1:20 PM PACEMAKER, [facility]FRANCES [facility] BS AMB   5/7/2026  9:45 AM [facility] ECHO LAB 1 [facility]   5/7/2026 10:30 AM Ty Gipson MD [facility] AMB        Signature:___________________________________________________

## 2025-05-16 NOTE — PROGRESS NOTES
Patient: Panchito Crespo   Age: 89 y.o.     Patient Care Team:  Srinivasan Padilla PA as PCP - General  Srinivasan Padilla PA as PCP - Empaneled Provider  Ty Gipson MD (Cardiothoracic Surgery)  Milind Young MD as Consulting Physician (Cardiovascular Disease)    PCP: Srinivasan Padilla PA    Cardiologist: Dr. Young    Diagnosis/Reason for Consultation: The primary encounter diagnosis was S/P TAVR (transcatheter aortic valve replacement). A diagnosis of Nonrheumatic aortic (valve) stenosis was also pertinent to this visit.    Problem List:   Patient Active Problem List   Diagnosis    Palpitations    Hypercholesterolemia    AVNRT (AV ana re-entry tachycardia)    Status post catheter ablation of atrial flutter    Status post catheter ablation of slow pathway    Hypertension    Esophageal reflux    Pacemaker    Arthritis    SVT (supraventricular tachycardia)    Coronary artery disease due to lipid rich plaque    Chronic systolic CHF (congestive heart failure), NYHA class 2 (HCC)    Typical atrial flutter (HCC)    Chronic kidney disease, stage 3a (HCC)    Facial droop    Nonrheumatic aortic (valve) stenosis    S/P TAVR (transcatheter aortic valve replacement)         HPI: 89 y.o.  male  S/P TRANSCATHETER AORTIC VALVE REPLACEMENT (RIGHT TRANSFERMORAL 29S3) on 5/15/25 with Dr. Young and Leonela. Patient was transferred to the PACU in stable condition on no gtts. The patient's post operative course was uncomplicated. The patient was stable and ready for discharge on 05/16/25.  He is available today by telephone for his initial postoperative visit.    Denies fever, chills, worsening shortness of breath or fatigue, chest pain, orthopnea, PND, dizziness, syncope or recent fall, or issues with his incisions.  He states that he has been feeling well.  He is getting around his house without issue.  He has not had an issues with his medications.  He tells me that he last checked his BP on Sunday and that his

## 2025-05-16 NOTE — DISCHARGE SUMMARY
Cardiac Surgery Discharge Summary     Patient ID:  Panchito Crespo  [id number]  89 y.o.  8/21/1933    Admit date: 5/15/2025    Discharge date: 5/16/2025     Admitting Physician: Ty Gipson MD     Referring Cardiologist:  Dr. Young    PCP:  Srinivasan Padilla PA    Admitting Diagnoses: Aortic Stenosis    Discharge Diagnoses: Aortic Stenosis s/p TAVR    1. Nonrheumatic aortic valve stenosis    2. Aortic stenosis        Discharged Condition: Stable    Disposition: home, see patient instructions for treatment and plan    Procedures for this admission:  Procedure(s):  TRANSCATHETER AORTIC VALVE REPLACEMENT (RIGHT TRANSFERMORAL 29S3)  .    Discharge Medications:      Medication List        START taking these medications      acetaminophen 325 MG tablet  Commonly known as: TYLENOL  Take 2 tablets by mouth every 4 hours as needed for Pain            CONTINUE taking these medications      aspirin 81 MG chewable tablet  Take 1 tablet by mouth daily     brimonidine 0.2 % ophthalmic solution  Commonly known as: ALPHAGAN     dorzolamide 2 % ophthalmic solution  Commonly known as: TRUSOPT     latanoprost 0.005 % ophthalmic solution  Commonly known as: XALATAN     losartan 25 MG tablet  Commonly known as: COZAAR  Take 1 tablet by mouth once daily     metoprolol succinate 50 MG extended release tablet  Commonly known as: TOPROL XL  Take 1 tablet by mouth in the morning and at bedtime     omeprazole 20 MG delayed release capsule  Commonly known as: PRILOSEC     PRESERVISION AREDS PO     rivaroxaban 20 MG Tabs tablet  Commonly known as: XARELTO     rosuvastatin 10 MG tablet  Commonly known as: CRESTOR               Where to Get Your Medications        Information about where to get these medications is not yet available    Ask your nurse or doctor about these medications  acetaminophen 325 MG tablet         HPI: Copied from H&P dated 5/8/25    Panchito Crespo is a 89 y.o.  male  with PMHx of Aortic Stenosis, CAD with Hx

## 2025-05-16 NOTE — PROGRESS NOTES
Cardiac Surgery FLOOR Progress Note    Admit Date: 5/15/2025  POD: 1 Day Post-Op      Procedure:  Procedure(s):  TRANSCATHETER AORTIC VALVE REPLACEMENT (RIGHT TRANSFERMORAL 29S3)      Subjective:   Patient seen with Dr. Gipson. Afebrile, room air.  Up in the chair. No complaints.    Objective:     /70   Pulse 79   Temp 98.1 °F (36.7 °C) (Oral)   Resp 24   Wt 86.4 kg (190 lb 7.6 oz)   SpO2 97%   BMI 27.33 kg/m²   Temp (24hrs), Av.1 °F (36.7 °C), Min:97.6 °F (36.4 °C), Max:98.6 °F (37 °C)      Last 24hr Input/Output:    Intake/Output Summary (Last 24 hours) at 2025 0757  Last data filed at 2025 0736  Gross per 24 hour   Intake 1290 ml   Output 1530 ml   Net -240 ml        EKG/Rhythm: SR/Vpaced in the 70s    Encounter Date: 05/15/25   EKG 12 lead   Result Value    Ventricular Rate 70    Atrial Rate 70    QRS Duration 148    Q-T Interval 488    QTc Calculation (Bazett) 527    R Axis 36    T Axis 84    Diagnosis      Ventricular-paced rhythm  Biventricular pacemaker detected  Abnormal ECG  When compared with ECG of 15-MAY-2025 12:35,  MANUAL COMPARISON REQUIRED, DATA IS UNCONFIRMED         Oxygen: Room air    CXR: Xray Result (most recent):  XR CHEST PORTABLE 05/15/2025    Narrative  EXAM:  XR CHEST PORTABLE    INDICATION: Post TAVR    COMPARISON: 10/18/2024    TECHNIQUE: 1206 hours portable chest AP view    FINDINGS: Status post median sternotomy. Pacemaker is present. TAVR is noted.  There is mild cardiomegaly. Lungs demonstrate low lung volumes with mild  perihilar edema. Calcified pleural plaques are noted these are stable and  consistent with asbestos exposure.. No pneumothorax.    Post left shoulder replacement.    There is right-sided carotid artery calcification noted.    Impression  1. Status post TAVR  2. Mild perihilar interstitial edema is noted.      Electronically signed by Joel Richardson          Admission Weight: Last Weight   Weight - Scale: 86.4 kg (190 lb 7.6 oz) Weight -

## 2025-05-16 NOTE — PROGRESS NOTES
Cardiac Surgery Care Coordinator- Met with Panchito Crespo and his family. provided Reviewed plan of care and discussed planned discharge later today. Encouraged continued use of the incentive spirometer. Reviewed the importance of daily temp and weight monitoring, discussed groin site care and reviewed signs and symptoms of infection. Red reminder bracelet on left wrist, reviewed purpose of the bracelet and when to call the MD. Provided them with his valve identification card and dental prophylaxis card. Discussed the purpose of both cards. Reminded pt of appts and encouraged participation in the Cardiac Wellness and rehab program after discharge. He is not sure he wants to participate. Encouraged them to verbalize and emotional support given.They are without questions or concerns at this time.

## 2025-05-16 NOTE — CARE COORDINATION
GUY    The discharge order is in and CM met w patient and his family at bedside to discuss the d/c plan w returning home and to review an important message from Medicare. Patient verbalized understanding and gave permission for possible discharge within 4 hours of receiving IMM.  Family provided positive feedback on the care and will transport home.  All questions have been answered and CM wished patient well.     ZAK Luis CCM  Care Management   Available on [employer] or [phone]

## 2025-05-16 NOTE — OP NOTE
Operative Note    Patient: Panchito Crespo  YOB: 1933  MRN: [id number]     Date of Procedure: 5/15/2025     Pre-Op Diagnosis Codes:      * Aortic stenosis [I35.0]     Post-Op Diagnosis: Same       Procedure(s):  TRANSCATHETER AORTIC VALVE REPLACEMENT (RIGHT TRANSFERMORAL 29S3)  .     Implants:  Implant Name Type Inv. Item Serial No.  Lot No. LRB No. Used Action   VALVE HRT TRANSCATH 29MM HUNG 3 ULTRA - [id number] Aortic valves VALVE HRT TRANSCATH 29MM HUNG 3 ULTRA [id number] [employer]- N/A N/A 1 Implanted      PROCEDURALISTS:?   Surgeon 1. Milind Young MD?  CoSurgeon 1.  Errol Gipson MD     PROCEDURES:  1. Angiography of the ascending aorta and aortoiliac bifurcation  2. Temporary transvenous pacemaker insertion  3. Left heart catheterization  4. Implantation of a 29 mm Moreno S3 transcatheter aortic valve via the left transfemoral approach  5. 16F right?femoral artery access with Perclose pre-closure / 6F left femoral vein access with manual compression / 6F left femoral artery access with Perclose closure     INDICATIONS:?Panchito Crespo is a 89 y.o. ?old male  with severe symptomatic aortic stenosis, NYHA Class iii symptom status.? He is referred for transfemoral TAVR procedure. He was deemed as prohibitive risk for SAVR.     PRE-OP DIAGNOSIS:  1. Severe, symptomatic aortic stenosis (NYHA, Class III)  2.  Known coronary artery disease status post coronary bypass grafting  3.  No significant cardiomyopathy     POST-OP DIAGNOSIS:  1. Status post successful implantation of a 29 mm Moreno S3 transcatheter Aortic valve via the left transfemoral approach  2. No significant post operative conduction block     PROCEDURAL DETAILS: The risks (death, myocardial infarction, stroke, bleeding, vascular complications, renal dysfunction, allergic reactions, and other), benefits, and alternatives were explained and discussed. Signed, informed consent was obtained. The patient

## 2025-05-16 NOTE — PLAN OF CARE
Problem: Safety - Adult  Goal: Free from fall injury  5/16/2025 0126 by Galdino Osman RN  Outcome: Progressing  5/15/2025 1836 by Lore Castano RN  Outcome: Progressing     Problem: Chronic Conditions and Co-morbidities  Goal: Patient's chronic conditions and co-morbidity symptoms are monitored and maintained or improved  5/16/2025 0126 by Galdino Osman RN  Outcome: Progressing  Flowsheets (Taken 5/15/2025 2000)  Care Plan - Patient's Chronic Conditions and Co-Morbidity Symptoms are Monitored and Maintained or Improved: Monitor and assess patient's chronic conditions and comorbid symptoms for stability, deterioration, or improvement  5/15/2025 1836 by Lore Castano RN  Outcome: Progressing  Flowsheets (Taken 5/15/2025 1651)  Care Plan - Patient's Chronic Conditions and Co-Morbidity Symptoms are Monitored and Maintained or Improved: Monitor and assess patient's chronic conditions and comorbid symptoms for stability, deterioration, or improvement     Problem: Discharge Planning  Goal: Discharge to home or other facility with appropriate resources  Recent Flowsheet Documentation  Taken 5/15/2025 2000 by Galdino Osman RN  Discharge to home or other facility with appropriate resources: Identify barriers to discharge with patient and caregiver  5/15/2025 1836 by Lore Castano RN  Outcome: Progressing  Flowsheets (Taken 5/15/2025 1651)  Discharge to home or other facility with appropriate resources: Identify barriers to discharge with patient and caregiver     Problem: ABCDS Injury Assessment  Goal: Absence of physical injury  5/16/2025 0126 by Galdino Osman RN  Outcome: Progressing  5/15/2025 1836 by Lore Castano RN  Outcome: Progressing

## 2025-05-16 NOTE — PROGRESS NOTES
0830: Echo tech at bedside. Patient assisted back to bed from recliner x 1 assist.    1130: AVS reviewed with patient, spouse, and daughter at bedside. Follow ups and post op instructions discussed. All questions answered. GALLITO Marin at bedside. Patient provided with valve card, antibiotic card, and red armband on.     1140: Patient discharged with belongings and family. Stable at time of D/C.

## 2025-05-19 ENCOUNTER — OFFICE VISIT (OUTPATIENT)
Age: 89
End: 2025-05-19
Payer: MEDICARE

## 2025-05-19 DIAGNOSIS — Z95.2 S/P TAVR (TRANSCATHETER AORTIC VALVE REPLACEMENT): Primary | ICD-10-CM

## 2025-05-19 DIAGNOSIS — I35.0 NONRHEUMATIC AORTIC (VALVE) STENOSIS: ICD-10-CM

## 2025-05-19 PROCEDURE — 99447 NTRPROF PH1/NTRNET/EHR 11-20: CPT | Performed by: NURSE PRACTITIONER

## 2025-05-23 ENCOUNTER — TELEPHONE (OUTPATIENT)
Age: 89
End: 2025-05-23

## 2025-05-23 NOTE — TELEPHONE ENCOUNTER
The patient's wife called with concerns about bruising in the patient's groin that is expanding \"two inches wide onto his stomach\". She states that there is a small hard lump that is unchanged since he was in the hospital, but that the area of bruising has grown and is moving across his abdomen. She confirmed that there is no drainage or oozing from the site. There is no redness or pain and the patient is afebrile without chills. The patient's mobility is unchanged. He did start wearing jeans again after his procedure, where is was previously only wearing loose sweat pants.     I reviewed that bruising is normal after TAVR and that the bruising may change over time. I offered her reassurance and the option to bring the patient to the clinic if she would like a provider to examine the site. I also reminded her that a provider can always be reached using the number on the red wrist band that the patient is wearing. She opted not to bring the patient in to the clinic.    Jennifer Monte  Structural Heart RN Coordinator

## 2025-07-16 ASSESSMENT — KANSAS CITY CARDIOMYOPATHY QUESTIONNAIRE (KCCQ12)
8C. OVER THE PAST 2 WEEKS, ON AVERAGE, HOW HAS HEART FAILURE LIMITED YOU ABILITY TO VISIT FAMILY AND FRIENDS OUR OF YOUR HOME: DID NOT LIMIT AT ALL
1C. OVER THE PAST 2 WEEKS, HOW MUCH WERE YOU LIMITED BY HEART FAILURE SYMPTOMS (SHORTNESS OF BREATH OR FATIGUE) WHEN HURRYING OR JOGGING (AS IF TO CATCH A BUS): LIMITED FOR OTHER REASONS OR DID NOT DO THE ACTIVITY
8B. OVER THE PAST 2 WEEKS, ON AVERAGE, HOW HAS HEART FAILURE LIMITED YOU ABILITY TO WORK OR DO HOUSEHOLD CHORES: DOES NOT APPLY OR DID NOT DO FOR OTHER REASONS
8A. OVER THE PAST 2 WEEKS, ON AVERAGE, HOW HAS HEART FAILURE LIMITED YOU ABILITY TO DO HOBBIES OR RECREATIONAL ACTIVITIES: DOES NOT APPLY OR DID NOT DO FOR OTHER REASONS
7. IF YOU HAD TO SPEND THE REST OF YOUR LIFE WITH YOUR HEART FAILURE THE WAY IT IS RIGHT NOW, HOW WOULD YOU FEEL ABOUT THIS?: MOSTLY SATISFIED
1B. OVER THE PAST 2 WEEKS, HOW MUCH WERE YOU LIMITED BY HEART FAILURE SYMPTOMS (SHORTNESS OF BREATH OR FATIGUE) WHEN WALKING 1 BLOCK ON LEVEL GROUND: SLIGHTLY LIMITED
1A. OVER THE PAST 2 WEEKS, HOW MUCH WERE YOU LIMITED BY HEART FAILURE SYMPTOMS (SHORTNESS OF BREATH OR FATIGUE) WHEN SHOWERING OR BATHING: NOT AT ALL LIMITED
6. OVER THE PAST 2 WEEKS, HOW MUCH HAS YOUR HEART FAILURE LIMITED YOUR ENJOYMENT OF LIFE: IT HAS NOT LIMITED MY ENJOYMENT OF LIFE AT ALL
2. OVER THE PAST 2 WEEKS, HOW MANY TIMES DID YOU HAVE SWELLING IN YOUR FEET, ANKLES OR LEGS WHEN YOU WOKE UP IN THE MORNING: 3 OR MORE TIMES PER WEEK BUT NOT EVERY DAY
5. OVER THE PAST 2 WEEKS, ON AVERAGE, HOW MANY TIMES HAVE YOU BEEN FORCED TO SLEEP SITTING UP IN A CHAIR OR WITH AT LEAST 3 PILLOWS TO PROP YOU UP BECAUSE OF SHORTNESS OF BREATH?: NEVER OVER THE PAST 2 WEEKS
3. OVER THE PAST 2 WEEKS, ON AVERAGE, HOW MANY TIMES HAS FATIGUE LIMITED YOUR ABILITY TO DO WHAT YOU WANTED: NEVER OVER THE PAST 2 WEEKS

## 2025-07-17 ENCOUNTER — TELEPHONE (OUTPATIENT)
Age: 89
End: 2025-07-17

## 2025-07-17 ENCOUNTER — HOSPITAL ENCOUNTER (OUTPATIENT)
Facility: HOSPITAL | Age: 89
Discharge: HOME | End: 2025-07-19
Payer: MEDICARE

## 2025-07-17 ENCOUNTER — OFFICE VISIT (OUTPATIENT)
Age: 89
End: 2025-07-17
Payer: MEDICARE

## 2025-07-17 VITALS
WEIGHT: 190 LBS | HEIGHT: 70 IN | SYSTOLIC BLOOD PRESSURE: 151 MMHG | BODY MASS INDEX: 27.2 KG/M2 | DIASTOLIC BLOOD PRESSURE: 80 MMHG

## 2025-07-17 VITALS
SYSTOLIC BLOOD PRESSURE: 146 MMHG | HEART RATE: 70 BPM | BODY MASS INDEX: 25.83 KG/M2 | TEMPERATURE: 98.2 F | WEIGHT: 180 LBS | OXYGEN SATURATION: 97 % | DIASTOLIC BLOOD PRESSURE: 80 MMHG

## 2025-07-17 DIAGNOSIS — Z95.2 S/P TAVR (TRANSCATHETER AORTIC VALVE REPLACEMENT): ICD-10-CM

## 2025-07-17 DIAGNOSIS — Z95.2 S/P TAVR (TRANSCATHETER AORTIC VALVE REPLACEMENT): Primary | ICD-10-CM

## 2025-07-17 PROCEDURE — 1123F ACP DISCUSS/DSCN MKR DOCD: CPT | Performed by: INTERNAL MEDICINE

## 2025-07-17 PROCEDURE — 1159F MED LIST DOCD IN RCRD: CPT | Performed by: INTERNAL MEDICINE

## 2025-07-17 PROCEDURE — G8419 CALC BMI OUT NRM PARAM NOF/U: HCPCS | Performed by: INTERNAL MEDICINE

## 2025-07-17 PROCEDURE — 93308 TTE F-UP OR LMTD: CPT

## 2025-07-17 PROCEDURE — G8427 DOCREV CUR MEDS BY ELIG CLIN: HCPCS | Performed by: INTERNAL MEDICINE

## 2025-07-17 PROCEDURE — 1036F TOBACCO NON-USER: CPT | Performed by: INTERNAL MEDICINE

## 2025-07-17 PROCEDURE — 99214 OFFICE O/P EST MOD 30 MIN: CPT | Performed by: INTERNAL MEDICINE

## 2025-07-17 NOTE — TELEPHONE ENCOUNTER
----- Message from GALLITO LEE RN sent at 7/17/2025  2:13 PM EDT -----  Regarding: Follow up  This was a patient of Dr. Stubbs. Hector is going to follow. Can you schedule a follow up in 3-6 months.    Thanks!

## 2025-07-17 NOTE — TELEPHONE ENCOUNTER
Telephone call made to patient and wife Sneha answered. Two patient identifiers verified.3-6 month follow up made with wife on PHI     Future Appointments   Date Time Provider Department Center   12/5/2025  1:20 PM Milind Young MD [facility] BS AMB   5/7/2026  9:45 AM [facility] ECHO LAB 1 [facility]   5/7/2026 10:30 AM Ty Gipson MD [facility] BS AMB

## 2025-07-18 LAB
ECHO AV AREA PEAK VELOCITY: 1.5 CM2
ECHO AV AREA VTI: 1.6 CM2
ECHO AV AREA/BSA PEAK VELOCITY: 0.7 CM2/M2
ECHO AV AREA/BSA VTI: 0.8 CM2/M2
ECHO AV MEAN GRADIENT: 5 MMHG
ECHO AV MEAN VELOCITY: 1.1 M/S
ECHO AV PEAK GRADIENT: 10 MMHG
ECHO AV PEAK VELOCITY: 1.6 M/S
ECHO AV VELOCITY RATIO: 0.38
ECHO AV VTI: 30.7 CM
ECHO BSA: 2.06 M2
ECHO LV FRACTIONAL SHORTENING: 21 % (ref 28–44)
ECHO LV INTERNAL DIMENSION DIASTOLE INDEX: 2.6 CM/M2
ECHO LV INTERNAL DIMENSION DIASTOLIC: 5.3 CM (ref 4.2–5.9)
ECHO LV INTERNAL DIMENSION SYSTOLIC INDEX: 2.06 CM/M2
ECHO LV INTERNAL DIMENSION SYSTOLIC: 4.2 CM
ECHO LVOT AREA: 4.2 CM2
ECHO LVOT AV VTI INDEX: 0.39
ECHO LVOT DIAM: 2.3 CM
ECHO LVOT MEAN GRADIENT: 1 MMHG
ECHO LVOT PEAK GRADIENT: 1 MMHG
ECHO LVOT PEAK VELOCITY: 0.6 M/S
ECHO LVOT STROKE VOLUME INDEX: 24.2 ML/M2
ECHO LVOT SV: 49.4 ML
ECHO LVOT VTI: 11.9 CM
ECHO MV A VELOCITY: 0.48 M/S
ECHO MV AREA PHT: 8.2 CM2
ECHO MV E DECELERATION TIME (DT): 92.8 MS
ECHO MV E VELOCITY: 0.92 M/S
ECHO MV E/A RATIO: 1.92
ECHO MV PRESSURE HALF TIME (PHT): 26.9 MS

## 2025-07-24 LAB
ECHO AV AREA PEAK VELOCITY: 1.5 CM2
ECHO AV AREA VTI: 1.6 CM2
ECHO AV AREA/BSA PEAK VELOCITY: 0.8 CM2/M2
ECHO AV AREA/BSA VTI: 0.8 CM2/M2
ECHO AV MEAN GRADIENT: 5 MMHG
ECHO AV MEAN VELOCITY: 1.1 M/S
ECHO AV PEAK GRADIENT: 10 MMHG
ECHO AV PEAK VELOCITY: 1.6 M/S
ECHO AV VELOCITY RATIO: 0.38
ECHO AV VTI: 30.7 CM
ECHO BSA: 2.06 M2
ECHO EST RA PRESSURE: 3 MMHG
ECHO LV EF PHYSICIAN: 56 %
ECHO LV FRACTIONAL SHORTENING: 21 % (ref 28–44)
ECHO LV INTERNAL DIMENSION DIASTOLE INDEX: 2.65 CM/M2
ECHO LV INTERNAL DIMENSION DIASTOLIC: 5.3 CM (ref 4.2–5.9)
ECHO LV INTERNAL DIMENSION SYSTOLIC INDEX: 2.1 CM/M2
ECHO LV INTERNAL DIMENSION SYSTOLIC: 4.2 CM
ECHO LVOT AREA: 4.2 CM2
ECHO LVOT AV VTI INDEX: 0.39
ECHO LVOT DIAM: 2.3 CM
ECHO LVOT MEAN GRADIENT: 1 MMHG
ECHO LVOT PEAK GRADIENT: 1 MMHG
ECHO LVOT PEAK VELOCITY: 0.6 M/S
ECHO LVOT STROKE VOLUME INDEX: 24.7 ML/M2
ECHO LVOT SV: 49.4 ML
ECHO LVOT VTI: 11.9 CM
ECHO MV A VELOCITY: 0.48 M/S
ECHO MV AREA PHT: 8.2 CM2
ECHO MV E DECELERATION TIME (DT): 92.8 MS
ECHO MV E VELOCITY: 0.92 M/S
ECHO MV E/A RATIO: 1.92
ECHO MV PRESSURE HALF TIME (PHT): 26.9 MS

## 2025-08-04 ENCOUNTER — TELEPHONE (OUTPATIENT)
Age: 89
End: 2025-08-04

## 2025-08-04 RX ORDER — LOSARTAN POTASSIUM 25 MG/1
25 TABLET ORAL DAILY
Qty: 90 TABLET | Refills: 2 | Status: SHIPPED | OUTPATIENT
Start: 2025-08-04

## (undated) DEVICE — LUER-LOK 360°: Brand: CONNECTA, LUER-LOK

## (undated) DEVICE — PINNACLE INTRODUCER SHEATH: Brand: PINNACLE

## (undated) DEVICE — MICROPUNCTURE INTRODUCER SET SILHOUETTE TRANSITIONLESS WITH STAINLESS STEEL WIRE GUIDE: Brand: MICROPUNCTURE

## (undated) DEVICE — DRAPE C ARM W/ POLY STRP W42XL72IN FOR MOB XR

## (undated) DEVICE — 1000ML,PRESSURE INFUSER W/STOPCOCK: Brand: MEDLINE

## (undated) DEVICE — SYSTEM INTRO 9.5FR L13CM STD WHT CAP HEMSTAT SPLITTABLE

## (undated) DEVICE — HI-TORQUE VERSACORE MODIFIED J GUIDE WIRE SYSTEM 145 CM: Brand: HI-TORQUE VERSACORE

## (undated) DEVICE — CABLE RMFG E SUPREME 150CM BLU --

## (undated) DEVICE — RADIFOCUS GLIDECATH: Brand: GLIDECATH

## (undated) DEVICE — SOLID-TIP ABLATION CATHETER CABLE, STERILE CABLE: Brand: BLAZER™

## (undated) DEVICE — STIFFEN MICRO-INTRODUCER KIT: Brand: STIFFEN MICRO-INTRODUCER KIT

## (undated) DEVICE — GUIDEWIRE VASC L260CM DIA0.035IN RAD 3MM J TIP L7CM PTFE

## (undated) DEVICE — SUTURE ETHBND EXCEL SZ 2 L30IN NONABSORBABLE GRN L40MM V-37 MX69G

## (undated) DEVICE — 3M™ IOBAN™ 2 ANTIMICROBIAL INCISE DRAPE 6650EZ: Brand: IOBAN™ 2

## (undated) DEVICE — INTRODUCER SURG KT 0.018 IN 4 FR SFT TIP REG NIT VSI

## (undated) DEVICE — Device

## (undated) DEVICE — NEEDLE ANGIO 18GAX7CM SECURELOC

## (undated) DEVICE — CATH RMFG SUP 5F 10MM --

## (undated) DEVICE — GUIDEWIRE VASC L150CM DIA0.035IN FLX TIP L7CM PTFE STR FIX

## (undated) DEVICE — CABLE RMFG E SUPREME 150CM BLK --

## (undated) DEVICE — WRAP SURG W1.31XL1.34M CARD FOR PT 165-172CM THERMOWRP

## (undated) DEVICE — GLIDESHEATH SLENDER STAINLESS STEEL KIT: Brand: GLIDESHEATH SLENDER

## (undated) DEVICE — STERILE POLYISOPRENE POWDER-FREE SURGICAL GLOVES: Brand: PROTEXIS

## (undated) DEVICE — CATH EP 5F JSN  5MMX120CM -- SUPREME

## (undated) DEVICE — ELECTRODE PT RET AD L9FT HI MOIST COND ADH HYDRGEL CORDED

## (undated) DEVICE — KIT HND CTRL 3 W STPCOCK ROT END 54IN PREM HI PRSS TBNG AT

## (undated) DEVICE — 3M™ TEGADERM™ TRANSPARENT FILM DRESSING FRAME STYLE, 1626W, 4 IN X 4-3/4 IN (10 CM X 12 CM), 50/CT 4CT/CASE: Brand: 3M™ TEGADERM™

## (undated) DEVICE — BRACE ORTH CLOSURE XL AD BK SHLDR BLK QUIKDRAW PRO

## (undated) DEVICE — PRESSURE MONITORING SET: Brand: TRUWAVE

## (undated) DEVICE — NON-REM POLYHESIVE PATIENT RETURN ELECTRODE: Brand: VALLEYLAB

## (undated) DEVICE — CATHETER EP 6FR L92CM 2-5-2MM SPC TIP 1MM 4 ELECTRD D CRV

## (undated) DEVICE — ELECTRODE,RADIOTRANSLUCENT,FOAM,5PK: Brand: MEDLINE

## (undated) DEVICE — CABLE RMFG E SUPREME 150CM RED --

## (undated) DEVICE — PEELABLE OUTER GUIDE CATHETER
Type: IMPLANTABLE DEVICE | Status: NON-FUNCTIONAL
Brand: CPS DIRECT™
Removed: 2023-05-11

## (undated) DEVICE — PROVE COVER: Brand: UNBRANDED

## (undated) DEVICE — Device: Brand: LLD EZ LEAD LOCKING DEVICE

## (undated) DEVICE — PACK PROCEDURE SURG HRT CATH

## (undated) DEVICE — CATHETER DIAG 5FR L75CM ID0.046IN HK CRV VEIN SEL UNIQUE

## (undated) DEVICE — TR BAND RADIAL ARTERY COMPRESSION DEVICE: Brand: TR BAND

## (undated) DEVICE — GUIDEWIRE VASC L260CM DIA0.035IN TIP L3MM STD EXCHG PTFE J

## (undated) DEVICE — SYRINGE MED 30ML STD CLR PLAS LUERLOCK TIP N CTRL DISP

## (undated) DEVICE — KIT AT-X65 ANGIOTOUCH HAND CONTROLLER

## (undated) DEVICE — SUTURE V-LOC 180 SZ 2-0 L9IN ABSRB VLT GS-21 L37MM 1/2 CIR VLOCM0345

## (undated) DEVICE — PADPRO DEFIBRILLATION/PACING/CARDIOVERSION/MONITORING ELECTRODES, ADULT/CHILD GREATER THAN 10 KG RADIOTRANSPARENT ELECTRODE, PHYSIO-CONTROL QUIK-COMBO (M) 60" (152 CM): Brand: PADPRO

## (undated) DEVICE — HANDLE LT SNAP ON ULT DURABLE LENS FOR TRUMPF ALC DISPOSABLE

## (undated) DEVICE — CATHETER ANGIO AL1 038 5 FRX100 CM 5 CM PERFORMA

## (undated) DEVICE — INTRODUCER SHTH 6FR L12CM DIA0.038IN HEMSTAS CLOSE TOL

## (undated) DEVICE — PLASMABLADE PS200-040 4.0: Brand: PLASMABLADE™

## (undated) DEVICE — CO-SET DELIVERY SYSTEM FOR 123 ROOM TEMPATURE INJECTATE: Brand: CO-SET+

## (undated) DEVICE — KIT MED IMAG CNTRST AGNT W/ IOPAMIDOL REUSE

## (undated) DEVICE — AMPLATZ EXTRA STIFF WIRE GUIDE: Brand: AMPLATZ

## (undated) DEVICE — CATHETER COR DIAG PIGTAILS PIG 145 CRV 5FR 110CM 6 SIDE H

## (undated) DEVICE — GUIDE WIRE WITH HYDROPHILIC COATING: Brand: ACUITY WHISPER VIEW™

## (undated) DEVICE — AGENT HEMSTAT 3GM PURIFIED PLNT STARCH PWD ABSRB ARISTA AH

## (undated) DEVICE — GOWN,SIRUS,FABRNF,XL,20/CS: Brand: MEDLINE

## (undated) DEVICE — ROYAL SILK SURGICAL GOWN, XXL: Brand: CONVERTORS

## (undated) DEVICE — PACK SURG CARDIAC CATH DYNJ38860] MEDLINE INDUSTRIES INC]

## (undated) DEVICE — CATHETER ART THERMODILUTION 6 FRX110 CM 4 LUMEN SWAN

## (undated) DEVICE — KENDALL RADIOLUCENT FOAM MONITORING ELECTRODE RECTANGULAR SHAPE: Brand: KENDALL

## (undated) DEVICE — PERCLOSE PROGLIDE™ SUTURE-MEDIATED CLOSURE SYSTEM: Brand: PERCLOSE PROGLIDE™

## (undated) DEVICE — APPLICATOR MEDICATED 26 CC SOLUTION HI LT ORNG CHLORAPREP

## (undated) DEVICE — GUIDEWIRE VASC L180CM DIA0.035IN TIP L7CM PTFE S STL STR

## (undated) DEVICE — INTRODUCER SHTH 5FR L12CM SNAP LOK OBT 11FR DBL DST J STR

## (undated) DEVICE — CABLE RMFG EXT CATH --

## (undated) DEVICE — CATH RMFG ABLA SUPRM 5FRX120CM --

## (undated) DEVICE — 6 FOOT DISPOSABLE EXTENSION CABLE WITH SAFE CONNECT / SCREW-DOWN

## (undated) DEVICE — KIT ELECTRD SURF FOR DISPLAYING THE 3D POS OF EP CATH

## (undated) DEVICE — OPEN HEART A- RICHMOND: Brand: MEDLINE INDUSTRIES, INC.

## (undated) DEVICE — CABLE FOR DIAGNOSTIC CATHETER: Brand: CABLE, SURELINK™

## (undated) DEVICE — BAG TRASH WST 122 CM 183 CM 1400 CC FEMALE LUER PVC DEPOT

## (undated) DEVICE — CANNULA NSL ORAL AD FOR CAPNOFLEX CO2 O2 AIRLFE

## (undated) DEVICE — SUTURE STRATAFIX SPRL MCRYL + SZ 4-0 L12IN ABSRB UD PS-2 SXMP1B117

## (undated) DEVICE — INTRODUCER SHTH 8FR L63CM 8FR DIL GWIRE L145CM DIA0.038IN

## (undated) DEVICE — CATHETER DIAG 5FR L100CM LUMN ID0.047IN JR4 CRV 0 SIDE H

## (undated) DEVICE — CATHETER DIAG 5FR L100CM LUMN ID0.047IN JL4 CRV 0 SIDE H

## (undated) DEVICE — LEAD CUTTER: Brand: LEAD CUTTER

## (undated) DEVICE — SYRINGE MED 50ML LUERLOCK TIP

## (undated) DEVICE — KIT MFLD ISOLATN NACL CNTRST PRT TBNG SPIK W/ PRSS TRNSDUC

## (undated) DEVICE — CATH RMFG EP DCAPLR 6FR 125CM --

## (undated) DEVICE — CATHETER DIAG 5FR L110CM PIG CRV SZ 6 SIDE H DBL BRAID WIRE

## (undated) DEVICE — COVER,LIGHT,CAMERA,HARD,1/PK,STRL: Brand: MEDLINE

## (undated) DEVICE — GUIDEWIRE VASC L80CM DIA0.018IN TIP L5CM 15DEG ANG NIT

## (undated) DEVICE — WASTE KIT - ST MARY: Brand: MEDLINE INDUSTRIES, INC.

## (undated) DEVICE — Device: Brand: PADPRO

## (undated) DEVICE — RADIFOCUS GLIDEWIRE: Brand: GLIDEWIRE

## (undated) DEVICE — DRESSING ANTIMIC FOAM OPTIFOAM POSTOP ADH 4 X 6 IN

## (undated) DEVICE — SUTURE VCRL SZ 2-0 L36IN ABSRB UD L40MM CT 1/2 CIR J957H

## (undated) DEVICE — SPECIAL PROCEDURE DRAPE 32" X 34": Brand: SPECIAL PROCEDURE DRAPE

## (undated) DEVICE — CATH RMFG LIVWR 6FRX115 --

## (undated) DEVICE — ANGIOGRAPHY KIT

## (undated) DEVICE — TEMPERATURE ABLATION CATHETER: Brand: BLAZER® II HTD

## (undated) DEVICE — CATHETER DIAG 5FR L100CM LUMN ID0.047IN JL3.5 CRV 0 SIDE H

## (undated) DEVICE — SPLINT WR VELC FOAM NEUT POS DISP FOR RAD ART ACC SFT STRP